# Patient Record
Sex: FEMALE | Race: WHITE | NOT HISPANIC OR LATINO | URBAN - METROPOLITAN AREA
[De-identification: names, ages, dates, MRNs, and addresses within clinical notes are randomized per-mention and may not be internally consistent; named-entity substitution may affect disease eponyms.]

---

## 2019-08-12 ENCOUNTER — TRANSCRIBE ORDERS (OUTPATIENT)
Dept: LAB | Facility: HOSPITAL | Age: 69
End: 2019-08-12

## 2019-08-12 ENCOUNTER — APPOINTMENT (OUTPATIENT)
Dept: LAB | Facility: HOSPITAL | Age: 69
End: 2019-08-12
Attending: INTERNAL MEDICINE
Payer: COMMERCIAL

## 2019-08-12 DIAGNOSIS — E03.9 HYPOTHYROIDISM: ICD-10-CM

## 2019-08-12 DIAGNOSIS — C50.919 MALIGNANT NEOPLASM OF FEMALE BREAST (CMS/HCC): ICD-10-CM

## 2019-08-12 DIAGNOSIS — M81.0 AGE-RELATED OSTEOPOROSIS WITHOUT CURRENT PATHOLOGICAL FRACTURE: ICD-10-CM

## 2019-08-12 DIAGNOSIS — C50.919 MALIGNANT NEOPLASM OF FEMALE BREAST (CMS/HCC): Primary | ICD-10-CM

## 2019-08-12 LAB
CALCIUM SERPL-MCNC: 9.4 MG/DL (ref 8.9–10.3)
PTH-INTACT SERPL-MCNC: 85.1 PG/ML (ref 12–88)
T3 SERPL-MCNC: 103 NG/DL (ref 87–178)
T4 FREE SERPL-MCNC: 0.84 NG/DL (ref 0.58–1.64)
TSH SERPL DL<=0.05 MIU/L-ACNC: 2.62 MIU/L (ref 0.34–5.6)

## 2019-08-12 PROCEDURE — 83970 ASSAY OF PARATHORMONE: CPT

## 2019-08-12 PROCEDURE — 84443 ASSAY THYROID STIM HORMONE: CPT

## 2019-08-12 PROCEDURE — 84439 ASSAY OF FREE THYROXINE: CPT

## 2019-08-12 PROCEDURE — 36415 COLL VENOUS BLD VENIPUNCTURE: CPT

## 2019-08-12 PROCEDURE — 82652 VIT D 1 25-DIHYDROXY: CPT

## 2019-08-12 PROCEDURE — 84480 ASSAY TRIIODOTHYRONINE (T3): CPT

## 2019-08-15 LAB
1,25(OH)2D SERPL-MCNC: 39 PG/ML (ref 18–72)
1,25(OH)2D2 SERPL-MCNC: <8 PG/ML
1,25(OH)2D3 SERPL-MCNC: 39 PG/ML

## 2020-08-02 ENCOUNTER — HOSPITAL ENCOUNTER (EMERGENCY)
Facility: HOSPITAL | Age: 70
Discharge: HOME | End: 2020-08-02
Attending: STUDENT IN AN ORGANIZED HEALTH CARE EDUCATION/TRAINING PROGRAM
Payer: COMMERCIAL

## 2020-08-02 ENCOUNTER — APPOINTMENT (EMERGENCY)
Dept: RADIOLOGY | Facility: HOSPITAL | Age: 70
End: 2020-08-02
Attending: STUDENT IN AN ORGANIZED HEALTH CARE EDUCATION/TRAINING PROGRAM
Payer: COMMERCIAL

## 2020-08-02 VITALS
BODY MASS INDEX: 22.08 KG/M2 | HEIGHT: 62 IN | OXYGEN SATURATION: 100 % | RESPIRATION RATE: 16 BRPM | HEART RATE: 80 BPM | SYSTOLIC BLOOD PRESSURE: 159 MMHG | TEMPERATURE: 97.3 F | WEIGHT: 120 LBS | DIASTOLIC BLOOD PRESSURE: 90 MMHG
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
## 2020-08-02 DIAGNOSIS — S52.501A CLOSED FRACTURE OF DISTAL ENDS OF RIGHT RADIUS AND ULNA, INITIAL ENCOUNTER: ICD-10-CM

## 2020-08-02 DIAGNOSIS — S52.91XA CLOSED FRACTURE OF RIGHT FOREARM, INITIAL ENCOUNTER: Primary | ICD-10-CM

## 2020-08-02 DIAGNOSIS — S52.601A CLOSED FRACTURE OF DISTAL ENDS OF RIGHT RADIUS AND ULNA, INITIAL ENCOUNTER: ICD-10-CM

## 2020-08-02 LAB
ABO + RH BLD: NORMAL
ANION GAP SERPL CALC-SCNC: 9 MEQ/L (ref 3–15)
APTT PPP: 28 SEC (ref 23–35)
BLD GP AB SCN SERPL QL: NEGATIVE
BUN SERPL-MCNC: 18 MG/DL (ref 8–20)
CALCIUM SERPL-MCNC: 9.1 MG/DL (ref 8.9–10.3)
CHLORIDE SERPL-SCNC: 103 MEQ/L (ref 98–109)
CO2 SERPL-SCNC: 25 MEQ/L (ref 22–32)
CREAT SERPL-MCNC: 0.9 MG/DL (ref 0.6–1.1)
D AG BLD QL: POSITIVE
ERYTHROCYTE [DISTWIDTH] IN BLOOD BY AUTOMATED COUNT: 13 % (ref 11.7–14.4)
GFR SERPL CREATININE-BSD FRML MDRD: >60 ML/MIN/1.73M*2
GLUCOSE SERPL-MCNC: 94 MG/DL (ref 70–99)
HCT VFR BLDCO AUTO: 43.9 % (ref 35–45)
HGB BLD-MCNC: 14.9 G/DL (ref 11.8–15.7)
INR PPP: 0.9 INR
LABORATORY COMMENT REPORT: NORMAL
MCH RBC QN AUTO: 30.5 PG (ref 28–33.2)
MCHC RBC AUTO-ENTMCNC: 33.9 G/DL (ref 32.2–35.5)
MCV RBC AUTO: 90 FL (ref 83–98)
PDW BLD AUTO: 12 FL (ref 9.4–12.3)
PLATELET # BLD AUTO: 202 K/UL (ref 150–369)
POTASSIUM SERPL-SCNC: 3.9 MEQ/L (ref 3.6–5.1)
PROTHROMBIN TIME: 12.4 SEC (ref 12.2–14.5)
RBC # BLD AUTO: 4.88 M/UL (ref 3.93–5.22)
SARS-COV-2 RNA RESP QL NAA+PROBE: NEGATIVE
SODIUM SERPL-SCNC: 137 MEQ/L (ref 136–144)
WBC # BLD AUTO: 9.91 K/UL (ref 3.8–10.5)

## 2020-08-02 PROCEDURE — 73090 X-RAY EXAM OF FOREARM: CPT | Mod: RT

## 2020-08-02 PROCEDURE — 85730 THROMBOPLASTIN TIME PARTIAL: CPT | Performed by: STUDENT IN AN ORGANIZED HEALTH CARE EDUCATION/TRAINING PROGRAM

## 2020-08-02 PROCEDURE — 86850 RBC ANTIBODY SCREEN: CPT

## 2020-08-02 PROCEDURE — 80048 BASIC METABOLIC PNL TOTAL CA: CPT | Performed by: STUDENT IN AN ORGANIZED HEALTH CARE EDUCATION/TRAINING PROGRAM

## 2020-08-02 PROCEDURE — U0002 COVID-19 LAB TEST NON-CDC: HCPCS | Performed by: STUDENT IN AN ORGANIZED HEALTH CARE EDUCATION/TRAINING PROGRAM

## 2020-08-02 PROCEDURE — 0PSHXZZ REPOSITION RIGHT RADIUS, EXTERNAL APPROACH: ICD-10-PCS | Performed by: ORTHOPAEDIC SURGERY

## 2020-08-02 PROCEDURE — 63700000 HC SELF-ADMINISTRABLE DRUG: Performed by: STUDENT IN AN ORGANIZED HEALTH CARE EDUCATION/TRAINING PROGRAM

## 2020-08-02 PROCEDURE — 36415 COLL VENOUS BLD VENIPUNCTURE: CPT | Performed by: STUDENT IN AN ORGANIZED HEALTH CARE EDUCATION/TRAINING PROGRAM

## 2020-08-02 PROCEDURE — 99284 EMERGENCY DEPT VISIT MOD MDM: CPT | Mod: 25

## 2020-08-02 PROCEDURE — 0PSKXZZ REPOSITION RIGHT ULNA, EXTERNAL APPROACH: ICD-10-PCS | Performed by: ORTHOPAEDIC SURGERY

## 2020-08-02 PROCEDURE — 85027 COMPLETE CBC AUTOMATED: CPT | Performed by: STUDENT IN AN ORGANIZED HEALTH CARE EDUCATION/TRAINING PROGRAM

## 2020-08-02 PROCEDURE — 85610 PROTHROMBIN TIME: CPT | Performed by: STUDENT IN AN ORGANIZED HEALTH CARE EDUCATION/TRAINING PROGRAM

## 2020-08-02 PROCEDURE — 93005 ELECTROCARDIOGRAM TRACING: CPT | Performed by: STUDENT IN AN ORGANIZED HEALTH CARE EDUCATION/TRAINING PROGRAM

## 2020-08-02 PROCEDURE — 25605 CLTX DST RDL FX/EPHYS SEP W/: CPT | Mod: 59,RT

## 2020-08-02 RX ORDER — OXYCODONE AND ACETAMINOPHEN 5; 325 MG/1; MG/1
1 TABLET ORAL ONCE
Status: COMPLETED | OUTPATIENT
Start: 2020-08-02 | End: 2020-08-02

## 2020-08-02 RX ORDER — ACETAMINOPHEN 325 MG/1
650 TABLET ORAL ONCE
Status: DISCONTINUED | OUTPATIENT
Start: 2020-08-02 | End: 2020-08-02

## 2020-08-02 RX ORDER — OXYCODONE AND ACETAMINOPHEN 5; 325 MG/1; MG/1
1 TABLET ORAL EVERY 6 HOURS PRN
Qty: 8 TABLET | Refills: 0 | Status: SHIPPED | OUTPATIENT
Start: 2020-08-02 | End: 2022-07-31

## 2020-08-02 RX ADMIN — OXYCODONE HYDROCHLORIDE AND ACETAMINOPHEN 1 TABLET: 5; 325 TABLET ORAL at 09:08

## 2020-08-02 ASSESSMENT — ENCOUNTER SYMPTOMS
PHOTOPHOBIA: 0
SHORTNESS OF BREATH: 0
LIGHT-HEADEDNESS: 0
NUMBNESS: 0
WEAKNESS: 0
VOMITING: 0
FEVER: 0
HEADACHES: 0
JOINT SWELLING: 1
NECK PAIN: 0
COUGH: 0
ABDOMINAL PAIN: 0
NAUSEA: 0
APPETITE CHANGE: 0
BACK PAIN: 0
CHILLS: 0
DIZZINESS: 0
WOUND: 0

## 2020-08-02 NOTE — ED ATTESTATION NOTE
I saw and evaluated the patient, participated in the management, and agree with the findings in the above note. We discussed the case and the treatment plan.  Exam: vss, nad, nontoxic, head at/nc, normal speech, no resp distress, right UE evaluated with no tenderness to shoulder or elbow but deformity to mid/distal forearm. Skin intact. Radial/ulnar pulse easily palpable. Superficial abrasion to posterior forearm without contamination. Distal n/v intact.      Faustino Dias, DO  08/02/20 0905

## 2020-08-02 NOTE — DISCHARGE INSTRUCTIONS
Orthopaedic Surgeon: Dr. Slick Edge    - Will contact patient regarding upcoming surgery   - Advised to return to ED if start experiencing painful numbness/tingling or weakness to hand      For pain: motrin 600 mg with food or tylenol 650-975 mg every 6-8 hours.    For severe pain: percocet as prescribed. Be mindful percocet has tylenol (325 mg) therefore do not exceed 3,000 mg of tylenol in a 24 hour period.    Keep the splint on. Do not get this wet/ cover with showering.

## 2020-08-02 NOTE — CONSULTS
Orthopaedic Surgery Consult    CC: Right forearm injury    SUBJECTIVE   HPI: Tamara Omalley is a 70 y.o. female with PMHx of HTN and chronic pain on PRN oxycodone/gabapentin presenting with right forearm injury. Pt reports slipping and falling on a set of stairs at home, landing directly onto her outstretched right arm. She had immediate onset of pain and deformity to the right mid forearm. She denies any head trauma or loss of consciousness. Does not have pain to any other joint or extremity. No prior orthopaedic surgeries. Denies any numbness/tingling or weakness to hand.       Anticoagulation: N/a  Baseline ambulatory status:  Community ambulator    PMH:  HTN  Chronic rectal pain on prn oxycodone/gabapentin  History reviewed. No pertinent past medical history.    PSH:  History reviewed. No pertinent surgical history.    Meds:  Gabapentin PRN  Oxycodone PRN    No current facility-administered medications on file prior to encounter.      No current outpatient medications on file prior to encounter.       Allergies:  Allergies   Allergen Reactions   • Anastrozole      Other reaction(s): Arthralgias, Myalgias  Pain in finger joints  Pain in finger joints     • Iodine And Iodide Containing Products Rash     Topical Iodine  Topical Iodine         SH:   Social History     Socioeconomic History   • Marital status:      Spouse name: Not on file   • Number of children: Not on file   • Years of education: Not on file   • Highest education level: Not on file   Occupational History   • Not on file   Social Needs   • Financial resource strain: Not on file   • Food insecurity:     Worry: Not on file     Inability: Not on file   • Transportation needs:     Medical: Not on file     Non-medical: Not on file   Tobacco Use   • Smoking status: Never Smoker   • Smokeless tobacco: Never Used   Substance and Sexual Activity   • Alcohol use: Not Currently   • Drug use: Never   • Sexual activity: Defer   Lifestyle   • Physical  activity:     Days per week: Not on file     Minutes per session: Not on file   • Stress: Not on file   Relationships   • Social connections:     Talks on phone: Not on file     Gets together: Not on file     Attends [employer] service: Not on file     Active member of club or organization: Not on file     Attends meetings of clubs or organizations: Not on file     Relationship status: Not on file   • Intimate partner violence:     Fear of current or ex partner: Not on file     Emotionally abused: Not on file     Physically abused: Not on file     Forced sexual activity: Not on file   Other Topics Concern   • Not on file   Social History Narrative   • Not on file           ROS:  CV: Denies CP or Palpitations.  Pulm: Denies SOB or Pain with inspiration      OBJECTIVE  Vitals:    08/02/20 0857   BP: (!) 159/90   Pulse: 80   Resp: 16   Temp: 36.3 °C (97.3 °F)   SpO2: 100%       CBC Results     No lab values to display.          Physical Exam:    General  No acute distress  AAOx3    RUE  Inspection: Deformity present to right forearm with moderate swelling. Skin in tact.   Neurovascular: Palpable Radial Pulse. Sensation to light touch in Median, Ulnar, and Radial Distributions  Motor: Fires anterior interosseus nerve, posterior interosseus nerve, Radial nerve, Ulnar nerve, Hand intrinsics   Palpation:  Tenderness to palpation throughout forearm. Non tender to shoulder, elbow, wrist, or hand  ROM: Able to move shoulder and elbow without pain. Hand ROM stiff due to swelling but intact    LUE  Inspection: No gross deformity. Skin in tact.   Neurovascular: Palpable Radial Pulse. Sensation to light touch in Median, Ulnar, and Radial Distributions  Motor: Fires anterior interosseus nerve, posterior interosseus nerve, Radial nerve, Ulnar nerve, Hand intrinsics   Palpation:  No pain to palpation  ROM: Full Painless range of motion    RLE:  Inspection: No gross deformity. Skin in tact.   Neurovascular: Palpable dorsal pedis  Pulse. Sensation to light touch in Sural, Saphenous, Tibial, superficial peroneal nerve, and deep peroneal nerve Distibutions  Motor:  Fires iliopsoas, Quadriceps, Tibialis Anterior, extensor hallucis longus, gastrocnemius-soleus  Palpation:  No pain to palpation  ROM: Full Painless range of motion    LLE  Inspection: No gross deformity. Skin in tact.   Neurovascular: Palpable dorsal pedis Pulse. Sensation to light touch in Sural, Saphenous, Tibial, superficial peroneal nerve, and deep peroneal nerve Distibutions  Motor:  Fires iliopsoas, Quadriceps, Tibialis Anterior, extensor hallucis longus, gastrocnemius-soleus  Palpation:  No pain to palpation  ROM: Full Painless range of motion      Imaging:  X-rays of the right forearm demonstrate distal 1/3 right radius and ulna diaphyseal fractures with  Angulation to ulnar side. No significant shortening      ASSESSMENT & PLAN   70 y.o. female with right both bone forearm fracture    - Closed reduction/splinting of the right forearm  - Long arm splint  - NWB RUE in splint, sling for comfort  - Orthopaedic surgical intervention required for definitive fixation. Pt will f/u with Dr. Edge to discuss surgery as an outpatient  - Covid/pre-op labs ordered  - Advised patient to return to ED if she starts developing painful numbness/tingling or weakness to right hand    ORTHOPAEDIC SURGERY PROCEDURE NOTE    PROCEDURE: right both bone forearm fracture Closed Reduction/splinting    DIAGNOSIS: right both bone forearm Fracture    All risks, benefitis, and alternatives were discussed with patient regarding procedure  All question pertaining to the risks, benefits, and alternatives were addressed  Pt understands and agreed to proceed     Right hand was placed in fingertraps and 10 lbs of weight hung on right arm to allow for gentle traction of forearm     A sugar tong splint was then applied and interosseous mold was applied.     There were no complications from the procedure.  Pt  tolerated procedure well.       Rowdy Figueroa MD

## 2020-08-02 NOTE — ED PROVIDER NOTES
HPI     Chief Complaint   Patient presents with   • Fall     Patient fell down a few steps c/o right wrist pain    • Upper Extremity Issue       Pt is a 69 yo F with past medical history of HTN, HLD presenting with chief complaint of fall and right wrist pain arriving via EMS from home.  Patient reports just prior to arrival she slipped down her steps, falling down about 4 steps.  She states her right hand was outstretched behind her as she fell.  Patient denies hitting her head or loss of consciousness.  Patient states she is right-hand dominant.  She reports severe pain and obvious deformity in her right arm.  Denies prior history of fracture or surgery to this extremity.  Patient takes aspirin 81 mg every other day but is not on additional blood thinners.  Prior to the fall she has been otherwise feeling well.      History provided by:  Patient       Patient History     History reviewed. No pertinent past medical history.    History reviewed. No pertinent surgical history.    History reviewed. No pertinent family history.    Social History     Tobacco Use   • Smoking status: Never Smoker   • Smokeless tobacco: Never Used   Substance Use Topics   • Alcohol use: Not Currently   • Drug use: Never       Systems Reviewed from Nursing Triage:          Review of Systems     Review of Systems   Constitutional: Negative for appetite change, chills and fever.   Eyes: Negative for photophobia and visual disturbance.   Respiratory: Negative for cough and shortness of breath.    Cardiovascular: Negative for chest pain.   Gastrointestinal: Negative for abdominal pain, nausea and vomiting.   Musculoskeletal: Positive for joint swelling (R wrist). Negative for back pain, gait problem and neck pain.   Skin: Negative for wound.   Neurological: Negative for dizziness, syncope, weakness, light-headedness, numbness and headaches.        Physical Exam     ED Triage Vitals [08/02/20 0857]   Temp Heart Rate Resp BP SpO2   36.3 °C (97.3  "°F) 80 16 (!) 159/90 100 %      Temp src Heart Rate Source Patient Position BP Location FiO2 (%) (Set)   -- -- -- -- --       Pulse Ox %: 100 % (08/02/20 0857)  Pulse Ox Interpretation: Normal (08/02/20 0857)  Heart Rate: 80 (08/02/20 0857)  Rhythm Strip Interpretation: Normal Sinus Rhythm (08/02/20 0857)    Patient Vitals for the past 24 hrs:   BP Temp Pulse Resp SpO2 Height Weight   08/02/20 0857 (!) 159/90 36.3 °C (97.3 °F) 80 16 100 % 1.575 m (5' 2\") 54.4 kg (120 lb)                                          Physical Exam   Constitutional: She is oriented to person, place, and time. She appears well-developed and well-nourished.  Non-toxic appearance. She appears distressed (mildly secondary to pain).   HENT:   Head: Normocephalic and atraumatic.   Eyes: Pupils are equal, round, and reactive to light. Conjunctivae and EOM are normal.   Neck: Full passive range of motion without pain. No spinous process tenderness present. Normal range of motion present.   Cardiovascular: Normal rate and regular rhythm.   Pulses:       Radial pulses are 2+ on the right side.   Pulmonary/Chest: Effort normal. No respiratory distress.   Abdominal: Soft. She exhibits no distension. There is no tenderness.   Musculoskeletal:        Right shoulder: She exhibits normal range of motion and no bony tenderness.        Right elbow: She exhibits normal range of motion and no swelling. No tenderness found.        Right wrist: She exhibits decreased range of motion, bony tenderness, swelling and deformity (dorsal). She exhibits no laceration.        Thoracic back: She exhibits no tenderness and no bony tenderness.        Lumbar back: She exhibits no tenderness and no bony tenderness.        Right upper arm: She exhibits no tenderness and no bony tenderness.        Right forearm: She exhibits bony tenderness (mid humerus & ulna with obvious dorsal deformity of the shaft distally with pt attempt to range R hand) and swelling.        Right hand: " She exhibits normal range of motion and normal capillary refill. Normal sensation noted.   Neurological: She is alert and oriented to person, place, and time.   Skin: Skin is warm and dry.            Procedures    Labs Reviewed   SARS-COV-2 (COVID 19), PCR - Normal       Result Value    SARS-CoV-2 (COVID-19) Negative     CBC - Normal    WBC 9.91      RBC 4.88      Hemoglobin 14.9      Hematocrit 43.9      MCV 90.0      MCH 30.5      MCHC 33.9      RDW 13.0      Platelets 202      MPV 12.0     BASIC METABOLIC PANEL - Normal    Sodium 137      Potassium 3.9      Chloride 103      CO2 25      BUN 18      Creatinine 0.9      Glucose 94      Calcium 9.1      eGFR >60.0      Anion Gap 9     PTT - Normal    PTT 28      Narrative:     Specimen hemolyzed, clotting times may be falsely shortened     PROTIME-INR - Normal    PT 12.4      INR 0.9      Narrative:     Specimen hemolyzed, clotting times may be falsely shortened     TYPE AND SCREEN    Antibody Screen Negative      ABO A      Rh Factor Positive      History Check No type on file         ECG 12 lead   ED Interpretation   EKG 11:55 - nsr 64 bpm, LAD, good rwp, no st/t wave changes, qt/qtc 418/431 ms.       X-RAY FOREARM RIGHT 2 VIEWS   Final Result   IMPRESSION: Distal radial and ulnar fractures with improved alignment.      X-RAY FOREARM RIGHT 2 VIEWS   ED Interpretation   Mid radial/ulnar fx      Final Result   IMPRESSION: Acute distal radial and ulnar fractures.                  ED Course & [facility]         ED Course as of Aug 02 1552   Sun Aug 02, 2020   0903 Pt seen and evaluated along with PA-C. Suspected right forearm fx. Xray and analgesia ordered.     [NS]   0921 Xray demonstrates mid radial/ulnar fx. Page to orthopedics.     [NS]   0926 D/w ortho    [KM]   1021 Ortho at bedside to splint    [KM]   1056 Pt was splinted by ortho, pre-op labs & COVID testing ordered as probable plan for OR in 2 days with Ilyas. Pt can be d/c with splinting & sling, Ilyas will  contact her directly to confirm details.     [KM]   1155 EKG 11:55 - nsr 64 bpm, LAD, good rwp, no st/t wave changes, qt/qtc 418/431 ms.     [NS]      ED Course User Index  [KM] Temi Tejada PA C  [NS] Faustino Dias, DO         Clinical Impressions as of Aug 02 1552   Fall, initial encounter   Closed fracture of distal ends of right radius and ulna, initial encounter     Dispo  Discharge     Temi Tejada PA C  08/02/20 6742

## 2020-08-03 LAB
ATRIAL RATE: 64
P AXIS: 58
PR INTERVAL: 146
QRS DURATION: 84
QT INTERVAL: 418
QTC CALCULATION(BAZETT): 431
R AXIS: -8
T WAVE AXIS: 47
VENTRICULAR RATE: 64

## 2021-04-13 DIAGNOSIS — Z23 ENCOUNTER FOR IMMUNIZATION: ICD-10-CM

## 2022-07-31 ENCOUNTER — APPOINTMENT (EMERGENCY)
Dept: RADIOLOGY | Facility: HOSPITAL | Age: 72
DRG: 552 | End: 2022-07-31
Attending: EMERGENCY MEDICINE
Payer: MEDICARE

## 2022-07-31 ENCOUNTER — HOSPITAL ENCOUNTER (INPATIENT)
Facility: HOSPITAL | Age: 72
LOS: 3 days | Discharge: HOME HEALTH | DRG: 552 | End: 2022-08-05
Attending: EMERGENCY MEDICINE | Admitting: HOSPITALIST
Payer: MEDICARE

## 2022-07-31 DIAGNOSIS — R26.2 AMBULATORY DYSFUNCTION: ICD-10-CM

## 2022-07-31 DIAGNOSIS — Z91.89 AT RISK FOR PROLONGED QT INTERVAL SYNDROME: Primary | ICD-10-CM

## 2022-07-31 DIAGNOSIS — M54.32 LEFT SIDED SCIATICA: ICD-10-CM

## 2022-07-31 DIAGNOSIS — R52 INTRACTABLE PAIN: ICD-10-CM

## 2022-07-31 PROBLEM — M54.9 INTRACTABLE BACK PAIN: Status: ACTIVE | Noted: 2022-07-31

## 2022-07-31 LAB
ANION GAP SERPL CALC-SCNC: 9 MEQ/L (ref 3–15)
BASOPHILS # BLD: 0.04 K/UL (ref 0.01–0.1)
BASOPHILS NFR BLD: 0.5 %
BUN SERPL-MCNC: 15 MG/DL (ref 8–20)
CALCIUM SERPL-MCNC: 8.8 MG/DL (ref 8.9–10.3)
CHLORIDE SERPL-SCNC: 103 MEQ/L (ref 98–109)
CO2 SERPL-SCNC: 24 MEQ/L (ref 22–32)
CREAT SERPL-MCNC: 0.6 MG/DL (ref 0.6–1.1)
DIFFERENTIAL METHOD BLD: NORMAL
EOSINOPHIL # BLD: 0.08 K/UL (ref 0.04–0.36)
EOSINOPHIL NFR BLD: 0.9 %
ERYTHROCYTE [DISTWIDTH] IN BLOOD BY AUTOMATED COUNT: 13.7 % (ref 11.7–14.4)
GFR SERPL CREATININE-BSD FRML MDRD: >60 ML/MIN/1.73M*2
GLUCOSE SERPL-MCNC: 129 MG/DL (ref 70–99)
HCT VFR BLDCO AUTO: 44.7 % (ref 35–45)
HGB BLD-MCNC: 14.8 G/DL (ref 11.8–15.7)
IMM GRANULOCYTES # BLD AUTO: 0.03 K/UL (ref 0–0.08)
IMM GRANULOCYTES NFR BLD AUTO: 0.3 %
LYMPHOCYTES # BLD: 2.39 K/UL (ref 1.2–3.5)
LYMPHOCYTES NFR BLD: 27.3 %
MCH RBC QN AUTO: 30.8 PG (ref 28–33.2)
MCHC RBC AUTO-ENTMCNC: 33.1 G/DL (ref 32.2–35.5)
MCV RBC AUTO: 93.1 FL (ref 83–98)
MONOCYTES # BLD: 0.42 K/UL (ref 0.28–0.8)
MONOCYTES NFR BLD: 4.8 %
NEUTROPHILS # BLD: 5.8 K/UL (ref 1.7–7)
NEUTS SEG NFR BLD: 66.2 %
NRBC BLD-RTO: 0 %
PDW BLD AUTO: 11.6 FL (ref 9.4–12.3)
PLATELET # BLD AUTO: 222 K/UL (ref 150–369)
POTASSIUM SERPL-SCNC: 3.8 MEQ/L (ref 3.6–5.1)
RBC # BLD AUTO: 4.8 M/UL (ref 3.93–5.22)
SARS-COV-2 RNA RESP QL NAA+PROBE: NEGATIVE
SODIUM SERPL-SCNC: 136 MEQ/L (ref 136–144)
WBC # BLD AUTO: 8.76 K/UL (ref 3.8–10.5)

## 2022-07-31 PROCEDURE — 72100 X-RAY EXAM L-S SPINE 2/3 VWS: CPT

## 2022-07-31 PROCEDURE — 93971 EXTREMITY STUDY: CPT | Mod: LT

## 2022-07-31 PROCEDURE — 63700000 HC SELF-ADMINISTRABLE DRUG: Performed by: EMERGENCY MEDICINE

## 2022-07-31 PROCEDURE — 80048 BASIC METABOLIC PNL TOTAL CA: CPT | Performed by: EMERGENCY MEDICINE

## 2022-07-31 PROCEDURE — 96376 TX/PRO/DX INJ SAME DRUG ADON: CPT | Performed by: HOSPITALIST

## 2022-07-31 PROCEDURE — 36415 COLL VENOUS BLD VENIPUNCTURE: CPT | Performed by: EMERGENCY MEDICINE

## 2022-07-31 PROCEDURE — 63600000 HC DRUGS/DETAIL CODE: Performed by: EMERGENCY MEDICINE

## 2022-07-31 PROCEDURE — U0002 COVID-19 LAB TEST NON-CDC: HCPCS | Performed by: EMERGENCY MEDICINE

## 2022-07-31 PROCEDURE — 85025 COMPLETE CBC W/AUTO DIFF WBC: CPT | Performed by: EMERGENCY MEDICINE

## 2022-07-31 PROCEDURE — G0378 HOSPITAL OBSERVATION PER HR: HCPCS

## 2022-07-31 PROCEDURE — 96375 TX/PRO/DX INJ NEW DRUG ADDON: CPT

## 2022-07-31 PROCEDURE — 99284 EMERGENCY DEPT VISIT MOD MDM: CPT | Mod: 25

## 2022-07-31 PROCEDURE — 63600000 HC DRUGS/DETAIL CODE: Performed by: HOSPITALIST

## 2022-07-31 PROCEDURE — 96374 THER/PROPH/DIAG INJ IV PUSH: CPT

## 2022-07-31 PROCEDURE — 73502 X-RAY EXAM HIP UNI 2-3 VIEWS: CPT | Mod: LT

## 2022-07-31 PROCEDURE — 99220 PR INITIAL OBSERVATION CARE/DAY 70 MINUTES: CPT | Performed by: HOSPITALIST

## 2022-07-31 RX ORDER — VALACYCLOVIR HYDROCHLORIDE 1 G/1
TABLET, FILM COATED ORAL
COMMUNITY
Start: 2022-06-23 | End: 2023-12-02 | Stop reason: ENTERED-IN-ERROR

## 2022-07-31 RX ORDER — GABAPENTIN 100 MG/1
CAPSULE ORAL
COMMUNITY
Start: 2022-06-13 | End: 2023-12-02 | Stop reason: ENTERED-IN-ERROR

## 2022-07-31 RX ORDER — KETOROLAC TROMETHAMINE 15 MG/ML
15 INJECTION, SOLUTION INTRAMUSCULAR; INTRAVENOUS
Status: DISCONTINUED | OUTPATIENT
Start: 2022-08-01 | End: 2022-08-05 | Stop reason: HOSPADM

## 2022-07-31 RX ORDER — METOPROLOL TARTRATE 25 MG/1
25 TABLET, FILM COATED ORAL AS NEEDED
COMMUNITY
Start: 2021-09-01

## 2022-07-31 RX ORDER — ROSUVASTATIN CALCIUM 10 MG/1
10 TABLET, COATED ORAL
COMMUNITY
Start: 2022-05-15

## 2022-07-31 RX ORDER — OXYCODONE HYDROCHLORIDE 5 MG/1
5 TABLET ORAL
Status: ON HOLD | COMMUNITY
Start: 2022-07-08 | End: 2022-08-05 | Stop reason: SDUPTHER

## 2022-07-31 RX ORDER — HYDROMORPHONE HYDROCHLORIDE 1 MG/ML
1 INJECTION, SOLUTION INTRAMUSCULAR; INTRAVENOUS; SUBCUTANEOUS
Status: DISCONTINUED | OUTPATIENT
Start: 2022-07-31 | End: 2022-08-01

## 2022-07-31 RX ORDER — KETOROLAC TROMETHAMINE 15 MG/ML
15 INJECTION, SOLUTION INTRAMUSCULAR; INTRAVENOUS ONCE
Status: COMPLETED | OUTPATIENT
Start: 2022-07-31 | End: 2022-07-31

## 2022-07-31 RX ORDER — HYDROMORPHONE HYDROCHLORIDE 1 MG/ML
1 INJECTION, SOLUTION INTRAMUSCULAR; INTRAVENOUS; SUBCUTANEOUS ONCE
Status: COMPLETED | OUTPATIENT
Start: 2022-07-31 | End: 2022-07-31

## 2022-07-31 RX ORDER — POLYETHYLENE GLYCOL 3350 17 G/17G
17 POWDER, FOR SOLUTION ORAL
COMMUNITY

## 2022-07-31 RX ORDER — GABAPENTIN 100 MG/1
100 CAPSULE ORAL 4 TIMES DAILY
Status: DISCONTINUED | OUTPATIENT
Start: 2022-08-01 | End: 2022-08-04

## 2022-07-31 RX ORDER — PREDNISONE 20 MG/1
60 TABLET ORAL ONCE
Status: COMPLETED | OUTPATIENT
Start: 2022-07-31 | End: 2022-07-31

## 2022-07-31 RX ORDER — TRIAMTERENE/HYDROCHLOROTHIAZID 37.5-25 MG
1 TABLET ORAL
COMMUNITY
Start: 2022-05-11

## 2022-07-31 RX ORDER — AMOXICILLIN 250 MG
1 CAPSULE ORAL 2 TIMES DAILY
Status: DISCONTINUED | OUTPATIENT
Start: 2022-07-31 | End: 2022-08-05 | Stop reason: HOSPADM

## 2022-07-31 RX ORDER — PREDNISONE 20 MG/1
60 TABLET ORAL DAILY
Status: DISCONTINUED | OUTPATIENT
Start: 2022-08-01 | End: 2022-08-04

## 2022-07-31 RX ORDER — ROSUVASTATIN CALCIUM 10 MG/1
10 TABLET, COATED ORAL
Status: DISCONTINUED | OUTPATIENT
Start: 2022-08-01 | End: 2022-08-05 | Stop reason: HOSPADM

## 2022-07-31 RX ORDER — LIDOCAINE 560 MG/1
1 PATCH PERCUTANEOUS; TOPICAL; TRANSDERMAL DAILY
Status: DISCONTINUED | OUTPATIENT
Start: 2022-08-01 | End: 2022-08-05 | Stop reason: HOSPADM

## 2022-07-31 RX ORDER — POLYETHYLENE GLYCOL 3350 17 G/17G
17 POWDER, FOR SOLUTION ORAL DAILY
Status: DISCONTINUED | OUTPATIENT
Start: 2022-08-01 | End: 2022-08-05 | Stop reason: HOSPADM

## 2022-07-31 RX ORDER — ASPIRIN 81 MG/1
TABLET ORAL
COMMUNITY
Start: 2021-12-14

## 2022-07-31 RX ORDER — ZOLPIDEM TARTRATE 10 MG/1
TABLET ORAL
COMMUNITY
Start: 2022-06-27

## 2022-07-31 RX ORDER — GABAPENTIN 300 MG/1
300 CAPSULE ORAL EVERY MORNING
COMMUNITY
Start: 2022-05-16

## 2022-07-31 RX ORDER — LIDOCAINE 560 MG/1
1 PATCH PERCUTANEOUS; TOPICAL; TRANSDERMAL ONCE
Status: COMPLETED | OUTPATIENT
Start: 2022-07-31 | End: 2022-08-01

## 2022-07-31 RX ADMIN — HYDROMORPHONE HYDROCHLORIDE 1 MG: 1 INJECTION, SOLUTION INTRAMUSCULAR; INTRAVENOUS; SUBCUTANEOUS at 23:54

## 2022-07-31 RX ADMIN — KETOROLAC TROMETHAMINE 15 MG: 15 INJECTION, SOLUTION INTRAMUSCULAR; INTRAVENOUS at 23:55

## 2022-07-31 RX ADMIN — PREDNISONE 60 MG: 20 TABLET ORAL at 20:15

## 2022-07-31 RX ADMIN — HYDROMORPHONE HYDROCHLORIDE 1 MG: 1 INJECTION, SOLUTION INTRAMUSCULAR; INTRAVENOUS; SUBCUTANEOUS at 20:17

## 2022-07-31 RX ADMIN — KETOROLAC TROMETHAMINE 15 MG: 15 INJECTION, SOLUTION INTRAMUSCULAR; INTRAVENOUS at 18:07

## 2022-07-31 RX ADMIN — LIDOCAINE 1 PATCH: 246 PATCH TOPICAL at 18:06

## 2022-07-31 ASSESSMENT — ENCOUNTER SYMPTOMS
STIFFNESS: 0
MUSCLE WEAKNESS: 0
FREQUENCY: 0
BACK PAIN: 0
EXTREMITY NUMBNESS: 0
JOINT SWELLING: 0
VOMITING: 0
DYSURIA: 0
FEVER: 0
NUMBNESS: 0
CHILLS: 0
NAUSEA: 0
FLANK PAIN: 0
HEMATURIA: 0
DIARRHEA: 0
WEAKNESS: 0
LEG PAIN: 1
HIP PAIN: 1
ABDOMINAL PAIN: 0

## 2022-07-31 NOTE — ED PROVIDER NOTES
Emergency Medicine Note  HPI   HISTORY OF PRESENT ILLNESS     Woke this am with left leg pain worse with movement.  In the afternoon pain worsened tried her oxycodone and neurontin without relief.  Medics gave 25 mcg fentanyl with some relief.  Denies bowel or bladder incontinence, perianal numbness, focal weakness of lower extremity       History provided by:  Patient  Lower Extremity Issue  Location:  Hip  Time since incident:  12 hours  Injury: no    Hip location:  L hip  Pain details:     Quality:  Sharp and shooting    Radiates to:  L leg    Severity:  Moderate    Onset quality:  Sudden    Duration:  12 hours    Timing:  Constant    Progression:  Unchanged  Chronicity:  New  Prior injury to area:  No  Relieved by:  Immobilization  Worsened by:  Bearing weight, abduction and adduction  Ineffective treatments: neurontin, oxycodone.  Associated symptoms: decreased ROM    Associated symptoms: no back pain, no fever, no muscle weakness, no numbness, no stiffness, no swelling and no tingling          Patient History   PAST HISTORY     Reviewed from Nursing Triage:  Tobacco  Allergies  Meds  Problems  Med Hx  Surg Hx  Fam Hx  Soc   Hx        History reviewed. No pertinent past medical history.    History reviewed. No pertinent surgical history.    History reviewed. No pertinent family history.    Social History     Tobacco Use   • Smoking status: Never Smoker   • Smokeless tobacco: Never Used   Substance Use Topics   • Alcohol use: Not Currently   • Drug use: Never         Review of Systems   REVIEW OF SYSTEMS     Review of Systems   Constitutional: Negative for chills and fever.   Gastrointestinal: Negative for abdominal pain, diarrhea, nausea and vomiting.   Genitourinary: Negative for dysuria, flank pain, frequency, hematuria and urgency.   Musculoskeletal: Positive for gait problem. Negative for back pain, joint swelling and stiffness.   Skin: Negative for rash.   Neurological: Negative for weakness and  numbness.         VITALS     ED Vitals    Date/Time Temp Pulse Resp BP SpO2 [facility]   07/31/22 2157 -- 72 18 169/88 98 % [facility]   07/31/22 1956 -- 70 16 165/82 99 %    07/31/22 1748 37.4 °C (99.4 °F) 82 16 171/91 97 % JS        Pulse Ox %: 97 % (07/31/22 1841)  Pulse Ox Interpretation: Normal (07/31/22 1841)           Physical Exam   PHYSICAL EXAM     Physical Exam  Vitals and nursing note reviewed.   Constitutional:       Appearance: Normal appearance. She is normal weight.   HENT:      Head: Normocephalic.   Cardiovascular:      Rate and Rhythm: Normal rate and regular rhythm.      Heart sounds: Normal heart sounds.   Pulmonary:      Effort: Pulmonary effort is normal.      Breath sounds: Normal breath sounds.   Abdominal:      Palpations: Abdomen is soft.      Tenderness: There is no abdominal tenderness. There is no right CVA tenderness or left CVA tenderness.   Musculoskeletal:      Lumbar back: No tenderness or bony tenderness. Normal range of motion. Negative right straight leg raise test and negative left straight leg raise test.        Back:       Right hip: Normal. No tenderness or bony tenderness. Normal range of motion.      Left hip: Tenderness present. No bony tenderness or crepitus. Normal range of motion. Normal strength.      Right knee: Normal. No bony tenderness. Normal range of motion. No tenderness.      Left knee: Normal. No bony tenderness. Normal range of motion. No tenderness.      Right lower leg: No edema.      Left lower leg: No edema (no calf tenderness).      Right ankle: Normal. No tenderness. Normal range of motion.      Left ankle: Normal. No tenderness. Normal range of motion.      Right foot: Normal. Normal range of motion and normal capillary refill. No tenderness or bony tenderness. Normal pulse.      Left foot: Normal. Normal range of motion and normal capillary refill. No tenderness or bony tenderness. Normal pulse.   Skin:     General: Skin is warm and dry.      Capillary Refill:  Capillary refill takes less than 2 seconds.   Neurological:      Mental Status: She is alert and oriented to person, place, and time.   Psychiatric:         Mood and Affect: Mood normal.           PROCEDURES     Procedures     DATA     Results     Procedure Component Value Units Date/Time    SARS-CoV-2 (COVID-19), PCR Nasopharynx [[id number]]  (Normal) Collected: 07/31/22 2158    Specimen: Nasopharyngeal Swab from Nasopharynx Updated: 07/31/22 2232    Narrative:      The following orders were created for panel order SARS-CoV-2 (COVID-19), PCR Nasopharynx.  Procedure                               Abnormality         Status                     ---------                               -----------         ------                     SARS-CoV-2 (COVID-19), P...[[id number]]  Normal              Final result                 Please view results for these tests on the individual orders.    SARS-CoV-2 (COVID-19), PCR Nasopharynx [[id number]]  (Normal) Collected: 07/31/22 2158    Specimen: Nasopharyngeal Swab from Nasopharynx Updated: 07/31/22 2232     SARS-CoV-2 (COVID-19) Negative    Narrative:      Testing performed using real-time PCR for detection of COVID-19. EUA approved validation studies performed on site.     Basic metabolic panel [[id number]]  (Abnormal) Collected: 07/31/22 2124    Specimen: Blood, Venous Updated: 07/31/22 2149     Sodium 136 mEQ/L      Potassium 3.8 mEQ/L      Comment: Results obtained on plasma. Plasma Potassium values may be up to 0.4 mEQ/L less than serum values. The differences may be greater for patients with high platelet or white cell counts.        Chloride 103 mEQ/L      CO2 24 mEQ/L      BUN 15 mg/dL      Creatinine 0.6 mg/dL      Glucose 129 mg/dL      Calcium 8.8 mg/dL      eGFR >60.0 mL/min/1.73m*2      Anion Gap 9 mEQ/L     CBC and differential [[id number]] Collected: 07/31/22 2124    Specimen: Blood, Venous Updated: 07/31/22 2130     WBC 8.76 K/uL      RBC 4.80 M/uL      Hemoglobin 14.8  g/dL      Hematocrit 44.7 %      MCV 93.1 fL      MCH 30.8 pg      MCHC 33.1 g/dL      RDW 13.7 %      Platelets 222 K/uL      MPV 11.6 fL      Differential Type Auto     nRBC 0.0 %      Immature Granulocytes 0.3 %      Neutrophils 66.2 %      Lymphocytes 27.3 %      Monocytes 4.8 %      Eosinophils 0.9 %      Basophils 0.5 %      Immature Granulocytes, Absolute 0.03 K/uL      Neutrophils, Absolute 5.80 K/uL      Lymphocytes, Absolute 2.39 K/uL      Monocytes, Absolute 0.42 K/uL      Eosinophils, Absolute 0.08 K/uL      Basophils, Absolute 0.04 K/uL           Imaging Results          X-RAY LUMBAR SPINE 2 OR 3 VIEWS (Preliminary result)  Result time 07/31/22 19:14:10    ED Interpretation    Djd no fx reviewed with dr ruiz                             X-RAY HIP WITH OR WITHOUT PELVIS 2-3 VW LEFT (Preliminary result)  Result time 07/31/22 19:14:19    ED Interpretation    Djd no fx reviewed with dr ruiz                             US venous leg, LL extremity (Final result)  Result time 07/31/22 19:33:31    Final result                 Impression:    IMPRESSION:No evidence of deep venous thrombosis within the left lower  extremity.             Narrative:    CLINICAL HISTORY: Left lower extremity pain    COMMENT:Real-time sonography left lower extremity deep venous system was  performed 7/31/2022 utilizing color and spectral Doppler interrogation.    The left common femoral, femoral, popliteal and gastrocnemius veins are  compressible containing color flow throughout and spontaneous phasic waveform.  Augmentation is demonstrated.  The confluence is patent.  The posterior tibial  peroneal veins are compressible containing color flow throughout.  The right  common femoral vein is patent.                                No orders to display       Scoring tools                                 ED Course & MDM   MDM / ED COURSE / CLINICAL IMPRESSIONS / DISPO     MDM    ED Course as of 07/31/22 2313   Sun Jul 31, 2022   1937   venous leg, LL extremity  IMPRESSION:No evidence of deep venous thrombosis within the left lower  extremity. [RS]   2150 [facility] paged [RS]   2154 Signed out to [facility] [LB]      ED Course User Index  [LB] Veronique Small PA C  [RS] Evelio Perdomo, PHYLLIS CORDON     Admit / Observation   Final diagnoses:   [M54.32] Left sided sciatica   [R52] Intractable pain   [R26.2] Ambulatory dysfunction              Evelio Perdomo PA C  07/31/22 6690

## 2022-07-31 NOTE — ED ATTESTATION NOTE
I have personally seen and examined the patient.  I reviewed and agree with physician assistant / nurse practitioner’s assessment and plan of care.       Exam: Examination the patient's left lower extremity reveals pain overlying the left gluteal muscle and left posterior hip without contusion.  Examination of the lower extremities unremarkable I was not able to reproduce the patient's pain.  Homans' sign is negative.    Plan: We will check x-rays and ultrasound to further evaluate we will treat her pain with Toradol         Luke Moran DO  07/31/22 9188

## 2022-08-01 ENCOUNTER — APPOINTMENT (OUTPATIENT)
Dept: RADIOLOGY | Facility: HOSPITAL | Age: 72
Setting detail: OBSERVATION
DRG: 552 | End: 2022-08-01
Attending: HOSPITALIST
Payer: MEDICARE

## 2022-08-01 PROBLEM — I10 HYPERTENSION: Status: ACTIVE | Noted: 2022-08-01

## 2022-08-01 LAB
ATRIAL RATE: 71
P AXIS: 58
PR INTERVAL: 148
QRS DURATION: 92
QT INTERVAL: 424
QTC CALCULATION(BAZETT): 460
R AXIS: 20
T WAVE AXIS: 52
VENTRICULAR RATE: 71

## 2022-08-01 PROCEDURE — 63700000 HC SELF-ADMINISTRABLE DRUG: Performed by: INTERNAL MEDICINE

## 2022-08-01 PROCEDURE — 96374 THER/PROPH/DIAG INJ IV PUSH: CPT | Performed by: HOSPITALIST

## 2022-08-01 PROCEDURE — 63600000 HC DRUGS/DETAIL CODE: Performed by: HOSPITALIST

## 2022-08-01 PROCEDURE — 63700000 HC SELF-ADMINISTRABLE DRUG: Performed by: HOSPITALIST

## 2022-08-01 PROCEDURE — G0378 HOSPITAL OBSERVATION PER HR: HCPCS

## 2022-08-01 PROCEDURE — 97166 OT EVAL MOD COMPLEX 45 MIN: CPT | Mod: GO

## 2022-08-01 PROCEDURE — 96372 THER/PROPH/DIAG INJ SC/IM: CPT | Mod: 59 | Performed by: HOSPITALIST

## 2022-08-01 PROCEDURE — 99225 PR SBSQ OBSERVATION CARE/DAY 25 MINUTES: CPT | Performed by: HOSPITALIST

## 2022-08-01 PROCEDURE — 97162 PT EVAL MOD COMPLEX 30 MIN: CPT | Mod: GP

## 2022-08-01 PROCEDURE — 93005 ELECTROCARDIOGRAM TRACING: CPT | Performed by: HOSPITALIST

## 2022-08-01 PROCEDURE — 72158 MRI LUMBAR SPINE W/O & W/DYE: CPT | Mod: ME

## 2022-08-01 PROCEDURE — 200200 PR NO CHARGE: Performed by: HOSPITALIST

## 2022-08-01 PROCEDURE — 96376 TX/PRO/DX INJ SAME DRUG ADON: CPT | Performed by: HOSPITALIST

## 2022-08-01 PROCEDURE — A9585 GADOBUTROL INJECTION: HCPCS | Performed by: HOSPITALIST

## 2022-08-01 RX ORDER — TIZANIDINE 2 MG/1
2 TABLET ORAL 3 TIMES DAILY
Status: DISCONTINUED | OUTPATIENT
Start: 2022-08-01 | End: 2022-08-05 | Stop reason: HOSPADM

## 2022-08-01 RX ORDER — ONDANSETRON 4 MG/1
4 TABLET, ORALLY DISINTEGRATING ORAL EVERY 8 HOURS PRN
Status: DISCONTINUED | OUTPATIENT
Start: 2022-08-01 | End: 2022-08-05 | Stop reason: HOSPADM

## 2022-08-01 RX ORDER — SENNOSIDES 8.6 MG/1
2 TABLET ORAL NIGHTLY
Status: DISCONTINUED | OUTPATIENT
Start: 2022-08-01 | End: 2022-08-04

## 2022-08-01 RX ORDER — OXYCODONE HYDROCHLORIDE 5 MG/1
10 TABLET ORAL
Status: DISCONTINUED | OUTPATIENT
Start: 2022-08-01 | End: 2022-08-05 | Stop reason: HOSPADM

## 2022-08-01 RX ORDER — DIAZEPAM 2 MG/1
2 TABLET ORAL ONCE
Status: COMPLETED | OUTPATIENT
Start: 2022-08-01 | End: 2022-08-01

## 2022-08-01 RX ORDER — LORAZEPAM 1 MG/1
1 TABLET ORAL ONCE
Status: COMPLETED | OUTPATIENT
Start: 2022-08-01 | End: 2022-08-01

## 2022-08-01 RX ORDER — ZOLPIDEM TARTRATE 5 MG/1
5 TABLET ORAL ONCE
Status: COMPLETED | OUTPATIENT
Start: 2022-08-01 | End: 2022-08-01

## 2022-08-01 RX ORDER — TRIAMTERENE/HYDROCHLOROTHIAZID 37.5-25 MG
0.5 TABLET ORAL
Status: DISCONTINUED | OUTPATIENT
Start: 2022-08-01 | End: 2022-08-05 | Stop reason: HOSPADM

## 2022-08-01 RX ORDER — HEPARIN SODIUM 5000 [USP'U]/ML
5000 INJECTION, SOLUTION INTRAVENOUS; SUBCUTANEOUS
Status: DISCONTINUED | OUTPATIENT
Start: 2022-08-01 | End: 2022-08-04

## 2022-08-01 RX ORDER — HYDROMORPHONE HYDROCHLORIDE 1 MG/ML
1 INJECTION, SOLUTION INTRAMUSCULAR; INTRAVENOUS; SUBCUTANEOUS ONCE
Status: COMPLETED | OUTPATIENT
Start: 2022-08-01 | End: 2022-08-01

## 2022-08-01 RX ORDER — GADOBUTROL 604.72 MG/ML
6 INJECTION INTRAVENOUS ONCE
Status: COMPLETED | OUTPATIENT
Start: 2022-08-01 | End: 2022-08-01

## 2022-08-01 RX ORDER — OXYCODONE HYDROCHLORIDE 5 MG/1
5 TABLET ORAL
Status: DISCONTINUED | OUTPATIENT
Start: 2022-08-01 | End: 2022-08-01

## 2022-08-01 RX ORDER — ZOLPIDEM TARTRATE 5 MG/1
10 TABLET ORAL NIGHTLY PRN
Status: DISCONTINUED | OUTPATIENT
Start: 2022-08-01 | End: 2022-08-05 | Stop reason: HOSPADM

## 2022-08-01 RX ORDER — ASPIRIN 81 MG/1
81 TABLET ORAL DAILY
Status: DISCONTINUED | OUTPATIENT
Start: 2022-08-01 | End: 2022-08-05 | Stop reason: HOSPADM

## 2022-08-01 RX ADMIN — HEPARIN SODIUM 5000 UNITS: 5000 INJECTION, SOLUTION INTRAVENOUS; SUBCUTANEOUS at 13:37

## 2022-08-01 RX ADMIN — ZOLPIDEM TARTRATE 10 MG: 5 TABLET, COATED ORAL at 23:00

## 2022-08-01 RX ADMIN — GADOBUTROL 6 MMOL: 604.72 INJECTION INTRAVENOUS at 17:02

## 2022-08-01 RX ADMIN — DIAZEPAM 2 MG: 2 TABLET ORAL at 05:12

## 2022-08-01 RX ADMIN — ZOLPIDEM TARTRATE 5 MG: 5 TABLET, COATED ORAL at 02:42

## 2022-08-01 RX ADMIN — HEPARIN SODIUM 5000 UNITS: 5000 INJECTION, SOLUTION INTRAVENOUS; SUBCUTANEOUS at 06:01

## 2022-08-01 RX ADMIN — HEPARIN SODIUM 5000 UNITS: 5000 INJECTION, SOLUTION INTRAVENOUS; SUBCUTANEOUS at 21:19

## 2022-08-01 RX ADMIN — OXYCODONE HYDROCHLORIDE 5 MG: 5 TABLET ORAL at 02:42

## 2022-08-01 RX ADMIN — OXYCODONE HYDROCHLORIDE 10 MG: 5 TABLET ORAL at 09:18

## 2022-08-01 RX ADMIN — ROSUVASTATIN CALCIUM 10 MG: 10 TABLET, FILM COATED ORAL at 18:22

## 2022-08-01 RX ADMIN — OXYCODONE HYDROCHLORIDE 10 MG: 5 TABLET ORAL at 06:30

## 2022-08-01 RX ADMIN — SENNOSIDES AND DOCUSATE SODIUM 1 TABLET: 50; 8.6 TABLET ORAL at 08:30

## 2022-08-01 RX ADMIN — LIDOCAINE 1 PATCH: 246 PATCH TOPICAL at 18:22

## 2022-08-01 RX ADMIN — POLYETHYLENE GLYCOL 3350 17 G: 17 POWDER, FOR SOLUTION ORAL at 08:29

## 2022-08-01 RX ADMIN — KETOROLAC TROMETHAMINE 15 MG: 15 INJECTION, SOLUTION INTRAMUSCULAR; INTRAVENOUS at 22:12

## 2022-08-01 RX ADMIN — LORAZEPAM 1 MG: 1 TABLET ORAL at 15:47

## 2022-08-01 RX ADMIN — ASPIRIN 81 MG: 81 TABLET, COATED ORAL at 08:30

## 2022-08-01 RX ADMIN — SENNOSIDES AND DOCUSATE SODIUM 1 TABLET: 50; 8.6 TABLET ORAL at 20:33

## 2022-08-01 RX ADMIN — OXYCODONE HYDROCHLORIDE 10 MG: 5 TABLET ORAL at 21:18

## 2022-08-01 RX ADMIN — HYDROMORPHONE HYDROCHLORIDE 1 MG: 1 INJECTION, SOLUTION INTRAMUSCULAR; INTRAVENOUS; SUBCUTANEOUS at 05:12

## 2022-08-01 RX ADMIN — OXYCODONE HYDROCHLORIDE 10 MG: 5 TABLET ORAL at 12:12

## 2022-08-01 RX ADMIN — PREDNISONE 60 MG: 20 TABLET ORAL at 08:30

## 2022-08-01 RX ADMIN — TIZANIDINE 2 MG: 2 TABLET ORAL at 13:37

## 2022-08-01 RX ADMIN — OXYCODONE HYDROCHLORIDE 10 MG: 5 TABLET ORAL at 18:25

## 2022-08-01 RX ADMIN — TRIAMTERENE AND HYDROCHLOROTHIAZIDE 0.5 TABLET: 37.5; 25 TABLET ORAL at 08:30

## 2022-08-01 RX ADMIN — TIZANIDINE 2 MG: 2 TABLET ORAL at 20:33

## 2022-08-01 RX ADMIN — OXYCODONE HYDROCHLORIDE 10 MG: 5 TABLET ORAL at 15:32

## 2022-08-01 ASSESSMENT — COGNITIVE AND FUNCTIONAL STATUS - GENERAL
AFFECT: WFL
MOVING TO AND FROM BED TO CHAIR: 3 - A LITTLE
DRESSING REGULAR LOWER BODY CLOTHING: 2 - A LOT
WALKING IN HOSPITAL ROOM: 3 - A LITTLE
DRESSING REGULAR UPPER BODY CLOTHING: 4 - NONE
MOVING TO AND FROM BED TO CHAIR: 3 - A LITTLE
HELP NEEDED FOR BATHING: 3 - A LITTLE
CLIMB 3 TO 5 STEPS WITH RAILING: 2 - A LOT
HELP NEEDED FOR PERSONAL GROOMING: 3 - A LITTLE
DRESSING REGULAR LOWER BODY CLOTHING: 3 - A LITTLE
STANDING UP FROM CHAIR USING ARMS: 3 - A LITTLE
WALKING IN HOSPITAL ROOM: 3 - A LITTLE
EATING MEALS: 4 - NONE
EATING MEALS: 4 - NONE
CLIMB 3 TO 5 STEPS WITH RAILING: 2 - A LOT
TOILETING: 3 - A LITTLE
STANDING UP FROM CHAIR USING ARMS: 3 - A LITTLE
HELP NEEDED FOR BATHING: 2 - A LOT
TOILETING: 2 - A LOT
DRESSING REGULAR UPPER BODY CLOTHING: 4 - NONE
HELP NEEDED FOR PERSONAL GROOMING: 3 - A LITTLE
AFFECT: WFL

## 2022-08-01 ASSESSMENT — PATIENT HEALTH QUESTIONNAIRE - PHQ9: SUM OF ALL RESPONSES TO PHQ9 QUESTIONS 1 & 2: 0

## 2022-08-01 NOTE — PROGRESS NOTES
Patient:  Tamara Omalley  Location:  [facility] 5PAV 5416  MRN:  [id number]  Today's date:  8/1/2022     Start: RN aware and cleared for therapy. R'cved in bed , agreeable to OT session    End:  Pt left in bed w/ alarm activated. Call bell and personal items within reach. NAD and VSS. Nurse notified of session progress/outcome.      Tamara is a 72 y.o. female admitted on 7/31/2022 with Left sided sciatica [M54.32]  Intractable pain [R52]  Intractable back pain [M54.9]  Ambulatory dysfunction [R26.2]. Principal problem is Intractable back pain.    Past Medical History  Tamara has no past medical history on file.    History of Present Illness  left lower extremity pain that started this morning and has gotten progressively worse.  States that the pain is worse with ambulation.  Limited range of movement.  Associated with back pain that is located in the lumbar area.  Pain is described as sharp, stabbing and shooting pain that runs down from the back to the left lower extremity.  Pain is constant with no improvement of the intensity.  Denies any alleviating factors.  Exacerbated by abduction, abduction, bearing weight and attempting to ambulate.  Patient attempted to take her oxycodone and Neurontin at home with minimal relief. For possible MRI L/S.      OT Vitals    Date/Time Pulse HR Source SpO2 Pt Activity O2 Therapy BP BP Location BP Method Pt Position [facility]   08/01/22 1450 72 Monitor 97 % At rest None (Room air) 137/65 Left upper arm Automatic Lying VTV   08/01/22 1509 66 -- 98 % At rest None (Room air) 147/68 Left upper arm Automatic Lying VTV      OT Pain    Date/Time Pain Type Side/Orientation Location Rating: Rest Rating: Activity Interventions [facility]   08/01/22 1450 Pain Assessment;Pain Reassessment left leg 6 8 position adjusted;care clustered VTV          Prior Living Environment    Flowsheet Row Most Recent Value   Current Living Arrangements home   Living Environment Comment Pt lives w/ her , 2-3  MAX, split level home, 6 steps up to bed and stall shower. (+) DE on 1F        Prior Level of Function    Flowsheet Row Most Recent Value   Dominant Hand right   Ambulation independent   Transferring independent   Toileting independent   Bathing independent   Dressing independent   Eating independent   Communication understands/communicates without difficulty   Prior Level of Function Comment Per pt report she was IND w/ all ADLs and ambulation w/o AD   Assistive Device Currently Used at Home none        Occupational Profile    Flowsheet Row Most Recent Value   Reason for Services/Referral Pt adm w/ intractable back pain that is radiating to LLE   Successful Occupations Retired    Environmental Supports and Barriers From home w/            OT Evaluation and Treatment - 08/01/22 1450        OT Time Calculation    Start Time 1450     Stop Time 1509     Time Calculation (min) 19 min        Session Details    Document Type initial evaluation     Mode of Treatment occupational therapy        General Information    Patient Profile Reviewed yes     Onset of Illness/Injury or Date of Surgery 07/31/22     Referring Physician Alvino     Patient/Family/Caregiver Comments/Observations RN aware and cleared for therapy     General Observations of Patient Pt r'cved in bed, agreeable to OT/PT session     Existing Precautions/Restrictions fall;spinal        Cognition/Psychosocial    Affect/Mental Status (Cognition) WFL     Orientation Status (Cognition) oriented x 3     Follows Commands (Cognition) follows one-step commands     Cognitive Function attention deficit     Attention Deficit (Cognition) perseverates on recent conversation     Comment, Cognition Pt overall cooperative t/o session. Noted to be tangential at times and required redirection to task or question. Perseverative on different parts of conversation and not answering prompted question directly        Hearing Assessment    Hearing Status WFL         Vision Assessment/Intervention    Visual Impairment/Limitations corrective lenses full-time        Sensory Assessment (Somatosensory)    Sensory Assessment (Somatosensory) UE sensation intact        Range of Motion (ROM)    Range of Motion bilateral upper extremities;ROM is WFL     Comment: Range of Motion grossly WFL BUE - assessed during func activity        Strength (Manual Muscle Testing)    Strength (Manual Muscle Testing) bilateral upper extremities;strength is WFL        Strength Comprehensive (MMT)    Comment grossly WFL BUE - assessed during func activity        Bed Mobility    [place], Supine to Sit 2 person assist;minimum assist (75% or more patient effort)     [place], Sit to Supine 1 person assist;minimum assist (75% or more patient effort)     Assistive Device head of bed elevated;draw sheet;bed rails     Comment (Bed Mobility) OOB to R. MIN A x2 for completeness. Partial log roll with repetition of cues for hand placement and tech. MIN Ax1 for return to bed, eager to return to supine        Transfers    Transfers other (see comments)     Comment Pt able to complete minimal bedside stepping at MIN Ax1 and incr time. No overt LOB noted. Limited by pain and dcr activity tolerance. Unable to tolerate further OOB activity. Cannot tolerate sitting at EOB for long or sitting in recliner 2* pain        Sit to Stand Transfer    [place], Sit to Stand Transfer 2 person assist;minimum assist (75% or more patient effort)     Verbal Cues hand placement;technique;safety     Assistive Device walker, front-wheeled   gait belt contraindicated untill lumbar MRI    Comment From EOB        Stand to Sit Transfer    [place], Stand to Sit Transfer 2 person assist;minimum assist (75% or more patient effort)     Verbal Cues hand placement;safety;technique     Assistive Device walker, front-wheeled   gait belt contraindicated untill lumbar MRI    Comment To EOB        Toilet Transfer    Comment  Offered/declined        Safety Issues, Functional Mobility    Impairments Affecting Function (Mobility) balance;endurance/activity tolerance;pain;strength        Balance    Balance Assessment sitting static balance;sitting dynamic balance;sit to stand dynamic balance;standing dynamic balance;standing static balance     Static Sitting Balance mild impairment;sitting, edge of bed     Dynamic Sitting Balance mild impairment;sitting, edge of bed     Sit to Stand Dynamic Balance mild impairment;supported     Static Standing Balance mild impairment;supported     Dynamic Standing Balance moderate impairment;supported     Comment, Balance Ax2 for safe OOB activity w/ RW. No overt LOB noted. Limited by dcr activity tolerance and pain        Lower Body Dressing    Tasks socks     [place] maximum assist (25-49% patient effort)     Position supine     Comment MAX A to adjust BLE socks 2* consideration for pt's pain        Toileting    Comment offered/declined        AM-PAC (TM) - ADL (Current Function)    Putting on and taking off regular lower body clothing? 2 - A Lot     Bathing? 2 - A Lot     Toileting? 2 - A Lot     Putting on/taking off regular upper body clothing? 4 - None     How much help for taking care of personal grooming? 3 - A Little     Eating meals? 4 - None     AM-PAC (TM) ADL Score 17        Assessment/Plan (OT)    Daily Outcome Statement OT eval completed. Pt is a 72 y.o. female adm w/ intractable back pain w/ radiation to LLE. Pt required MIN A x2 for bed mobility, STS, and MIN Ax1 for minimal bedside stepping w/ RW. MAX A for LBD with consideration for pt's pain. Unable to tolerate sitting upright at EOB or in recliner. Limited by pain, dcr activity tolerance, and balance. OT to cont w/ POC in acute setting. Rec d/c SNF pending progress     Rehab Potential fair, will monitor progress closely     Therapy Frequency 3-5 times/wk     Planned Therapy Interventions adaptive equipment training;BADL  retraining;activity tolerance training;functional balance retraining;occupation/activity based interventions;patient/caregiver education/training;transfer/mobility retraining               OT Assessment/Plan    Flowsheet Row Most Recent Value   OT Recommended Discharge Disposition skilled nursing facility at 08/01/2022 1450   Anticipated Equipment Needs At Discharge (OT) other (see comments)  [TBD at next level of care] at 08/01/2022 1450   Patient/Family Therapy Goal Statement To return home at d/c at 08/01/2022 1450                    Education Documentation  Self-Care, taught by Debbi Morgan OT at 8/1/2022  4:02 PM.  Learner: Patient  Readiness: Acceptance  Method: Explanation  Response: Verbalizes Understanding  Comment: OT POC/role, safety and tech w/ func transfers and mobility w/ RW, safety w/ ADLs, and d/c planning          OT Goals    Flowsheet Row Most Recent Value   Bed Mobility Goal 1    Activity/Assistive Device bed mobility activities, all at 08/01/2022 1450   [place] supervision required at 08/01/2022 1450   Time Frame by discharge at 08/01/2022 1450   Progress/Outcome goal ongoing at 08/01/2022 1450   Transfer Goal 1    Activity/Assistive Device all transfers at 08/01/2022 1450   [place] supervision required at 08/01/2022 1450   Time Frame by discharge at 08/01/2022 1450   Progress/Outcome goal ongoing at 08/01/2022 1450   Dressing Goal 1    Activity/Adaptive Equipment lower body dressing at 08/01/2022 1450   [place] minimum assist (75% or more patient effort) at 08/01/2022 1450   Time Frame by discharge at 08/01/2022 1450   Progress/Outcome goal ongoing at 08/01/2022 1450   Toileting Goal 1    Activity/Assistive Device toileting skills, all at 08/01/2022 1450   [place] supervision required at 08/01/2022 1450   Time Frame by discharge at 08/01/2022 1450   Progress/Outcome goal ongoing at 08/01/2022 1450

## 2022-08-01 NOTE — H&P
Hospital Medicine Service -  History & Physical        CHIEF COMPLAINT   Back pain and left leg pain     HISTORY OF PRESENT ILLNESS      Tamara Omalley is a 72 y.o. female with a past medical history of in the setting of breast carcinoma, proctalgia fugax, hyperlipidemia, hypertension, osteopenia and history of anal fissure who presents with left lower extremity pain that started this morning and has gotten progressively worse.  States that the pain is worse with ambulation.  Limited range of movement.  Associated with back pain that is located in the lumbar area.  Pain is described as sharp, stabbing and shooting pain that runs down from the back to the left lower extremity.  Pain is constant with no improvement of the intensity.  Denies any alleviating factors.  Exacerbated by abduction, abduction, bearing weight and attempting to ambulate.  Patient attempted to take her oxycodone and Neurontin at home with minimal relief.  Denies any fevers, chills, weakness affecting the upper or lower extremities, paresthesias affecting the genital and sacral area.  Denies any urinary incontinence, urinary retention, fecal incontinence or fecal retention.  Denies any falls or trauma to the back.  Patient made the decision to come to the hospital for further evaluation and upon EMS arrival she was given a dose fentanyl 25 mcg with some relief of the pain.     X-ray of the lumbar spine and left hip have been obtained with degenerative joint disease seen but no acute fractures or abnormalities.  Upon review of records it appears the patient has had some issues with back pain and left hip pain for which she has received imaging in the past due to concern for possible metastatic disease in the setting of history of breast cancer.    Patient has been administered multiple doses of IV Dilaudid and IV Toradol with continued uncontrolled pain.    Patient denies any chest pain, palpitations, shortness of breath, pain with deep  respirations, abdominal pain, epigastric pain, lightheadedness, dizziness, diarrhea, constipation, dysuria or lower extremity swelling.    PAST MEDICAL AND SURGICAL HISTORY      Past medical history  Invasive Ductal Breast Carcinoma  Rectal Pain  Hyperlipidemia on statin therapy  Herpes zoster  Hypertension  Osteopenia  Agatston coronary artery calcium score less than 100 (2015)  Diverticulitis of colon  Colon adenomas  Proctalgia fugax  Anal fissure  Vaginal stricture  Osteoporosis    Surgical history  Hysteroscopy  Breast biopsy, breast lumpectomy    PCP: Tova Vargas MD    MEDICATIONS      Prior to Admission medications    Medication Sig Start Date End Date Taking? Authorizing Provider   aspirin 81 mg enteric coated tablet Takes every other day. 12/14/21  Yes Tom Bonilla MD   metoprolol tartrate (LOPRESSOR) 25 mg tablet Take 12.5 mg by mouth. 9/1/21  Yes Tom Bonilla MD   gabapentin (NEURONTIN) 100 mg capsule TAKE 1 CAPSULE BY MOUTH 4 TIMES A DAY 6/13/22   Tom Bonilla MD   gabapentin (NEURONTIN) 300 mg capsule TAKE 1 CAPSULE BY MOUTH 4 TIMES A DAY 5/16/22   Tom Bonilla MD   oxyCODONE (ROXICODONE) 5 mg immediate release tablet Take 5 mg by mouth 2 (two) times a day. 7/8/22   Tom Bonilla MD   polyethylene glycol (MIRALAX) 17 gram/dose powder Take 17 g by mouth.    Tom Bonilla MD   rosuvastatin (CRESTOR) 10 mg tablet Take 10 mg by mouth once daily. 5/15/22   Tom Bonilla MD   triamterene-hydrochlorothiazide (MAXZIDE-25) 37.5-25 mg per tablet Take 0.5 tablets by mouth once daily. 5/11/22   Tom Bonilla MD   valACYclovir (VALTREX) 1 gram tablet TAKE TWO TABLETS BY MOUTH TWICE A DAY AS NEEDED 6/23/22   Tom Bonilla MD   zolpidem (AMBIEN) 10 mg tablet TAKE 1 TABLET BY MOUTH DAILY AT BEDTIME AS NEEDED FOR SLEEP. 6/27/22   Tom Bonilla MD   oxyCODONE-acetaminophen (PERCOCET) 5-325 mg per tablet Take 1 tablet by mouth  every 6 (six) hours as needed for severe pain. Initial treatment. No driving / operating heavy machinery or drinking alcohol if taking. 8/2/20 7/31/22  Faustino Dias DO       ALLERGIES      Anastrozole and Iodine and iodide containing products    FAMILY HISTORY      History reviewed. No pertinent family history.    SOCIAL HISTORY      Social History     Socioeconomic History   • Marital status:      Spouse name: None   • Number of children: None   • Years of education: None   • Highest education level: None   Tobacco Use   • Smoking status: Never Smoker   • Smokeless tobacco: Never Used   Substance and Sexual Activity   • Alcohol use: Not Currently   • Drug use: Never   • Sexual activity: Defer     Social Determinants of Health     Food Insecurity: No Food Insecurity   • Worried About Running Out of Food in the Last Year: Never true   • Ran Out of Food in the Last Year: Never true       REVIEW OF SYSTEMS      All other systems reviewed and negative except as noted in HPI    PHYSICAL EXAMINATION      Temp:  [37.4 °C (99.4 °F)] 37.4 °C (99.4 °F)  Heart Rate:  [70-82] 72  Resp:  [16-18] 18  BP: (165-171)/(82-91) 169/88  Body mass index is 22.86 kg/m².    Physical Exam  Vitals and nursing note reviewed.   Constitutional:       General: She is not in acute distress.     Appearance: Normal appearance. She is not ill-appearing, toxic-appearing or diaphoretic.   HENT:      Head: Normocephalic and atraumatic.      Right Ear: External ear normal.      Left Ear: External ear normal.      Nose: Nose normal. No congestion or rhinorrhea.      Mouth/Throat:      Mouth: Mucous membranes are moist.      Pharynx: Oropharynx is clear. No oropharyngeal exudate or posterior oropharyngeal erythema.   Eyes:      General: No scleral icterus.     Conjunctiva/sclera: Conjunctivae normal.      Pupils: Pupils are equal, round, and reactive to light.   Cardiovascular:      Rate and Rhythm: Normal rate and regular rhythm.   Pulmonary:       Effort: Pulmonary effort is normal. No respiratory distress.      Breath sounds: Normal breath sounds. No wheezing, rhonchi or rales.   Abdominal:      General: Bowel sounds are normal. There is no distension.      Palpations: Abdomen is soft.      Tenderness: There is no abdominal tenderness. There is no guarding or rebound.   Musculoskeletal:      Cervical back: Normal range of motion and neck supple. No rigidity or tenderness.      Right lower leg: No edema.      Left lower leg: No edema.      Comments: Bilateral straight leg raise elicits back pain, limited range of movement   Lymphadenopathy:      Cervical: No cervical adenopathy.   Skin:     General: Skin is warm and dry.      Findings: No rash.   Neurological:      General: No focal deficit present.      Mental Status: She is alert and oriented to person, place, and time.   Psychiatric:         Mood and Affect: Mood normal.         Behavior: Behavior normal.         LABS / IMAGING / EKG        Labs  I have reviewed the patient's pertinent labs.  Significant abnormals are Glucose 129.  Lab Results   Component Value Date    GLUCOSE 129 (H) 07/31/2022    CALCIUM 8.8 (L) 07/31/2022     07/31/2022    K 3.8 07/31/2022    CO2 24 07/31/2022     07/31/2022    BUN 15 07/31/2022    CREATININE 0.6 07/31/2022     Lab Results   Component Value Date    WBC 8.76 07/31/2022    HGB 14.8 07/31/2022    HCT 44.7 07/31/2022    MCV 93.1 07/31/2022     07/31/2022       Imaging  I have independently reviewed the pertinent imaging from the last 24 hrs.  Xray L-spine and Left Hip with Pelvis  DJD    Ultrasound Venous Left Leg  The left common femoral, femoral, popliteal and gastrocnemius veins are  compressible containing color flow throughout and spontaneous phasic waveform.  Augmentation is demonstrated.  The confluence is patent.  The posterior tibial  peroneal veins are compressible containing color flow throughout.  The right  common femoral vein is  patent.     --  IMPRESSION:No evidence of deep venous thrombosis within the left lower  extremity.    SARS-CoV-2 (COVID-19) (no units)   Date/Time Value   08/02/2020 1045 Negative     ASSESSMENT AND PLAN           * Intractable back pain  Assessment & Plan  Assessment: Concern for lumbar radiculopathy with pain emanating from the lumbar region down the left lower extremity.  Pain has been uncontrolled despite administration of IV Toradol and IV Dilaudid.  No trauma to the back or left lower extremity.  Differentials include lumbar radiculopathy, osseous metastatic disease due to history of breast cancer versus musculoskeletal pain.    Plan:  Will admit and monitor patient closely on Medr.  Multimodal pain regimen to attempt to control pain.  Suspect there is a neuropathy component of the pain.  Placed on scheduled acetaminophen 650 mg every 6 hours x3 days.  IV Toradol 15 mg every 6 hours around-the-clock.  Oxycodone 5 mg every 3 hours as needed moderate to severe pain.  IV Dilaudid 1 mg every 3 hours for severe breakthrough pain not relieved with oxycodone.  P.o. diazepam 2 mg every 6 hours as needed.  Continue gabapentin 100 mg 4 times daily.  Lidoderm patch applied to the lumbar region.  K pad applied as needed for symptom relief.  Fall precautions.  PT/OT evaluation.  MRI of the lumbar spine to assess further.    Hypertension  Assessment & Plan  Plan:  Attempt to control underlying pain that can be contributing to elevated blood pressures.  Continue metoprolol tartrate 12.5 mg twice daily.  Continue triamterene hydrochlorothiazide 37.5/25 mg tablet half tablet daily.       VTE Assessment: [place] VTE Score: 4  VTE Prophylaxis: Current anticoagulants:  heparin (porcine) 5,000 unit/mL injection 5,000 Units, subcutaneous, q8h INT      Code Status: Full Code  Palliative Care Screening Score: 0   Estimated Discharge Date: 8/2/2022  Disposition Planning: PT is to evaluate patient and assess for needs at discharge.      Aditi Nava MD  7/31/2022

## 2022-08-01 NOTE — HOSPITAL COURSE
Tamara is a 72 y.o. female admitted on 7/31/2022 with Left sided sciatica [M54.32]  Intractable pain [R52]  Intractable back pain [M54.9]  Ambulatory dysfunction [R26.2]. Principal problem is Intractable back pain.    Past Medical History  Tamara has no past medical history on file.    History of Present Illness  left lower extremity pain that started this morning and has gotten progressively worse.  States that the pain is worse with ambulation.  Limited range of movement.  Associated with back pain that is located in the lumbar area.  Pain is described as sharp, stabbing and shooting pain that runs down from the back to the left lower extremity.  Pain is constant with no improvement of the intensity.  Denies any alleviating factors.  Exacerbated by abduction, abduction, bearing weight and attempting to ambulate.  Patient attempted to take her oxycodone and Neurontin at home with minimal relief. For possible MRI L/S.    Ortho consult:  72 y.o. female with back and left lower extremity pain in setting of far lateral L3/4 disc herniation and mild left knee OA     - No acute orthopaedic surgical intervention  - Management of lumbar disc herniation per neurosurgery  - Recommend rest, ice, elevation, and compression for left knee as needed  - NSAIDs/analgesia as indicated  - WBAT LLE    Neuro consult  No neurosurgical intervention recommended.

## 2022-08-01 NOTE — PLAN OF CARE
Problem: Adult Inpatient Plan of Care  Goal: Readiness for Transition of Care  Outcome: Progressing  Intervention: Mutually Develop Transition Plan  Flowsheets (Taken 8/1/2022 1213)  Anticipated Discharge Disposition: (Pending MRI and PT Eval when appropriate.) other (see comments)  Equipment Needed After Discharge: (Not known at this time.) other (see comments)  Assistive Device/Animal Currently Used at Home: none  Transportation Concerns: car, none  Readmission Within the Last 30 Days: no previous admission in last 30 days  Patient/Family Anticipated Services at Transition: (To be determined. Not known at this time.) other (see comments)  Patient/Family Anticipates Transition to: home with family  Transportation Anticipated: family or friend will provide  Concerns to be Addressed: discharge planning   Met with patient at bedside. Independent with adls prior to developing leg pain. Lives with . No services and no equipment at home. PCP Dr Tova Vargas. Pharmacy [facility] [address]. Discharge planning needs not known at this time. MRI, PT/OT consults pending.

## 2022-08-01 NOTE — PLAN OF CARE
Plan of Care Review  Plan of Care Reviewed With: patient  Progress: improving  Outcome Summary: Pt OOB x 1 with RW to toilet. Pt pain not well controlled overnight. PRN valium admin with better relief. VSS. For possible MRI. DC home when cleared.

## 2022-08-01 NOTE — NURSING NOTE
Pt pain not well controlled with current pain regimen, Spoke with Dr. Nava on phone will add PRN valium.

## 2022-08-01 NOTE — ASSESSMENT & PLAN NOTE
Monitor blood pressure  Continue metoprolol tartrate 12.5 mg twice daily.  Continue triamterene hydrochlorothiazide 37.5/25 mg tablet half tablet daily

## 2022-08-01 NOTE — PATIENT CARE CONFERENCE
Care Progression Rounds Note  Date: 8/1/2022  Time: 12:32 PM     Patient Name: Tamara Omalley     Medical Record Number: [id number]   YOB: 1950  Sex: Female      Room/Bed: 5416    Admitting Diagnosis: Left sided sciatica [M54.32]  Intractable pain [R52]  Intractable back pain [M54.9]  Ambulatory dysfunction [R26.2]   Admit Date/Time: 7/31/2022  5:47 PM    Primary Diagnosis: Intractable back pain  Principal Problem: Intractable back pain    GMLOS: pending  Anticipated Discharge Date: 8/2/2022    AM-PAC:  Mobility Score: 17    Discharge Planning:  Concerns to be Addressed: discharge planning  Anticipated Discharge Disposition: other (see comments) (Dispo Pending. MRI, PT/OT consults. )    Barriers to Discharge:  Medical issues not resolved, Test pending    Comments:       Participants:  advanced practice provider, , physical therapy, nursing, social work/services

## 2022-08-01 NOTE — ASSESSMENT & PLAN NOTE
Assessment: Concern for lumbar radiculopathy with pain emanating from the lumbar region down the left lower extremity.  Pain has been uncontrolled despite administration of IV Toradol and IV Dilaudid.  No trauma to the back or left lower extremity.  Differentials include lumbar radiculopathy, osseous metastatic disease due to history of breast cancer versus musculoskeletal pain.      Multimodal pain regimen to attempt to control pain.  Suspect there is a neuropathy component of the pain.  Placed on scheduled acetaminophen 650 mg every 6 hours x3 days.  IV Toradol 15 mg every 6 hours around-the-clock, hesitant about taking ibuprofen as outpatient  Oxycodone 5 mg every 3 hours as needed moderate to severe pain.  IV Dilaudid 1 mg every 3 hours for severe breakthrough pain not relieved with oxycodone while house  P.o. diazepam 2 mg every 6 hours as needed.  Continue gabapentin 100 mg 4 times daily.  Lidoderm patch applied to the lumbar region.  K pad applied as needed for symptom relief.  Fall precautions.  Patient is on prednisone here.  We will discharge her on Medrol Dosepak.  Pain management consulted for possible epidural injection. Dr Estrada evaluated and recommended arranging outpatient epidural after discharge  Neurosurgery recommended physical therapy in 4 weeks

## 2022-08-01 NOTE — PROGRESS NOTES
Patient: Tamara Omalley  Location:  [facility] 5PAV [id number]  MRN:  [id number]  Today's date:  8/1/2022    Patient returned to supine, pillow support under BLE provided in attempt to alleviate pain, call bell and belongings in reach, nurse aware of PT session.    Tamara is a 72 y.o. female admitted on 7/31/2022 with Left sided sciatica [M54.32]  Intractable pain [R52]  Intractable back pain [M54.9]  Ambulatory dysfunction [R26.2]. Principal problem is Intractable back pain.    Past Medical History  Tamara has no past medical history on file.    History of Present Illness  left lower extremity pain that started this morning and has gotten progressively worse.  States that the pain is worse with ambulation.  Limited range of movement.  Associated with back pain that is located in the lumbar area.  Pain is described as sharp, stabbing and shooting pain that runs down from the back to the left lower extremity.  Pain is constant with no improvement of the intensity.  Denies any alleviating factors.  Exacerbated by abduction, abduction, bearing weight and attempting to ambulate.  Patient attempted to take her oxycodone and Neurontin at home with minimal relief. For possible MRI L/S.     Therapy Pain/Vitals     Row Name 08/01/22 1450 08/01/22 1509       Pain/Comfort/Sleep    Pain Charting Type Pain Assessment;Pain Reassessment --    Preferred Pain Scale number (Numeric Rating Pain Scale) --    (0-10) Pain Rating: Rest 6 --    (0-10) Pain Rating: Activity 8 --    Pain Side/Orientation left --    Pain Body Location leg --    Pain Management Interventions position adjusted;care clustered --       Vitals    Pulse 72 66    Heart Rate Source Monitor --    SpO2 97 % 98 %    Patient Activity At rest At rest    Oxygen Therapy None (Room air) None (Room air)    /65 147/68    BP Location Left upper arm Left upper arm    BP Method Automatic Automatic    Patient Position Lying Lying                   PT Pain    Date/Time Pain  Type Side/Orientation Location Rating: Rest Rating: Activity Interventions [facility]   08/01/22 1450 Pain Assessment;Pain Reassessment left leg 6 8 position adjusted;care clustered VTV          Prior Living Environment    Flowsheet Row Most Recent Value   Current Living Arrangements home   Living Environment Comment Pt lives w/ her , 2-3 MAX, split level home, 6 steps up to bed and stall shower. (+) [place] on 1[place]        Prior Level of Function    Flowsheet Row Most Recent Value   Dominant Hand right   Ambulation independent   Transferring independent   Toileting independent   Bathing independent   Dressing independent   Eating independent   Communication understands/communicates without difficulty   Prior Level of Function Comment Per pt report she was IND w/ all ADLs and ambulation w/o AD   Assistive Device Currently Used at Home none           PT Evaluation and Treatment - 08/01/22 1449        PT Time Calculation    Start Time 1449     Stop Time 1508     Time Calculation (min) 19 min        Session Details    Document Type initial evaluation     Mode of Treatment physical therapy        General Information    Patient Profile Reviewed yes     Patient/Family/Caregiver Comments/Observations RN cleared for PT eval     General Observations of Patient Pt received in bed, agreeable to PT eval     Existing Precautions/Restrictions fall;spinal        Cognition/Psychosocial    Affect/Mental Status (Cognition) WFL     Orientation Status (Cognition) oriented x 3     Follows Commands (Cognition) follows one-step commands     Cognitive Function attention deficit     Attention Deficit (Cognition) perseverates on recent conversation     Comment, Cognition tangential and needed to be redirected to tasks        Sensory    Hearing Status WFL        Vision Assessment/Intervention    Visual Impairment/Limitations corrective lenses full-time        Sensory Assessment (Somatosensory)    Left LE Sensory Assessment light touch  awareness;intact     Right LE Sensory Assessment light touch awareness;intact        Range of Motion (ROM)    Left Lower Extremity (ROM) left LE ROM is WFL     Right Lower Extremity (ROM) right LE ROM is WFL     Comment: Range of Motion grossly WFL, but limited by pain in LLE primarily        Bed Mobility    Mower, Supine to Sit minimum assist (75% or more patient effort);2 person assist;verbal cues;nonverbal cues (demo/gesture)   educated on log rolling technique in order to minimize pain    Verbal Cues (Supine to Sit) hand placement;preparatory posture;safety;technique     Mower, Sit to Supine minimum assist (75% or more patient effort);1 person assist;verbal cues     Assistive Device head of bed elevated;bed rails     Comment (Bed Mobility) OOB to R, had LOB while sitting EOB needing Fay x1-2 to assist w/ maintaining EOB sitting balance        Sit to Stand Transfer    Mower, Sit to Stand Transfer minimum assist (75% or more patient effort);2 person assist;nonverbal cues (demo/gesture);verbal cues     Verbal Cues hand placement;maintaining center of gravity over base of support;preparatory posture;technique;safety     Assistive Device walker, front-wheeled     Comment increased pain to 8/10 LLE w/ WBing        Stand to Sit Transfer    Mower, Stand to Sit Transfer minimum assist (75% or more patient effort);2 person assist;verbal cues     Verbal Cues hand placement;safety;technique;proper use of assistive device;preparatory posture     Assistive Device walker, front-wheeled        Gait Training    Mower, Gait minimum assist (75% or more patient effort);2 person assist;verbal cues;nonverbal cues (demo/gesture)     Assistive Device walker, front-wheeled     Distance in Feet 2 feet     Pattern (Gait) step-to     Deviations/Abnormal Patterns (Gait) gait speed decreased;step length decreased;stride length decreased;antalgic;left sided deviations     Comment (Gait/Stairs) able to side  step 2ft to R while standing at EOB before having to sit due to severe LLE pain        Stairs Training    Comment not able to tolerate, not attempted        Safety Issues, Functional Mobility    Impairments Affecting Function (Mobility) balance;endurance/activity tolerance;pain;strength        Balance    Static Sitting Balance mild impairment;supported     Dynamic Sitting Balance mild impairment;supported     Sit to Stand Dynamic Balance mild impairment;supported     Static Standing Balance mild impairment;supported     Dynamic Standing Balance moderate impairment;supported        Motor Skills    Functional Endurance limited by severe pain LLE w/ standing activity        Coping    Observed Emotional State anxious;cooperative     Verbalized Emotional State anxiety     Trust Relationship/Rapport care explained;choices provided;emotional support provided;empathic listening provided;questions answered;questions encouraged;reassurance provided;thoughts/feelings acknowledged        AM-PAC (TM) - Mobility (Current Function)    Turning from your back to your side while in a flat bed without using bedrails? 3 - A Little     Moving from lying on your back to sitting on the side of a flat bed without using bedrails? 3 - A Little     Moving to and from a bed to a chair? 3 - A Little     Standing up from a chair using your arms? 3 - A Little     To walk in a hospital room? 3 - A Little     Climbing 3-5 steps with a railing? 2 - A Lot     AM-PAC (TM) Mobility Score 17        Assessment/Plan (PT)    Daily Outcome Statement Patient participatory but limited by severe pain LLE w/ mobility tasks. Carolyn x2 for bed, STS and amb w/ RW. Will continue to follow for d/c needs. Current rec is for SNF, but will continue to follow and update as appropriate. Patient wants d/c to home w/ 's assistance, however, currently limited functional mobility due to severe pain. Patient is planning for steroid injection to give relief.     Rehab  Potential good, to achieve stated therapy goals     Therapy Frequency 3-5 times/wk     Planned Therapy Interventions balance training;bed mobility training;gait training;stair training;strengthening;transfer training               PT Assessment/Plan    Flowsheet Row Most Recent Value   PT Recommended Discharge Disposition skilled nursing facility at 08/01/2022 1449   Anticipated Equipment Needs at Discharge (PT) walker, front-wheeled  [If for d/c to home, RW likely needed. If for SNF, equipment needs TBD at SNF.] at 08/01/2022 1449   Patient/Family Therapy Goals Statement to go home at d/c and have  assist at 08/01/2022 1449                    Education Documentation  Joint Mobility/Strength, taught by Nataliia Arreguin PT at 8/1/2022  4:13 PM.  Learner: Patient  Readiness: Acceptance  Method: Explanation, Demonstration  Response: Verbalizes Understanding, Demonstrated Understanding, Needs Reinforcement  Comment: PT provided TA and GT education, discussed GOC and d/c recs w/ patient.    Home Safety, taught by Nataliia Arreguin PT at 8/1/2022  4:13 PM.  Learner: Patient  Readiness: Acceptance  Method: Explanation, Demonstration  Response: Verbalizes Understanding, Demonstrated Understanding, Needs Reinforcement  Comment: PT provided TA and GT education, discussed GOC and d/c recs w/ patient.    Energy Conservation, taught by Nataliia Arreguin PT at 8/1/2022  4:13 PM.  Learner: Patient  Readiness: Acceptance  Method: Explanation, Demonstration  Response: Verbalizes Understanding, Demonstrated Understanding, Needs Reinforcement  Comment: PT provided TA and GT education, discussed GOC and d/c recs w/ patient.    Assistive/Adaptive Devices, taught by Nataliia Arreguin PT at 8/1/2022  4:13 PM.  Learner: Patient  Readiness: Acceptance  Method: Explanation, Demonstration  Response: Verbalizes Understanding, Demonstrated Understanding, Needs Reinforcement  Comment: PT provided TA and GT education, discussed GOC and d/c recs w/  patient.          PT Goals    Flowsheet Row Most Recent Value   Bed Mobility Goal 1    Activity/Assistive Device bed mobility activities, all at 08/01/2022 1449   [place] independent at 08/01/2022 1449   Transfer Goal 1    Activity/Assistive Device all transfers, walker, front-wheeled at 08/01/2022 1449   [place] supervision required at 08/01/2022 1449   Time Frame by discharge at 08/01/2022 1449   Progress/Outcome goal ongoing at 08/01/2022 1449   Gait Training Goal 1    Activity/Assistive Device gait (walking locomotion), walker, front-wheeled at 08/01/2022 1449   [place] modified independence at 08/01/2022 1449   Distance 50 at 08/01/2022 1449   Time Frame by discharge at 08/01/2022 1449   Progress/Outcome goal ongoing at 08/01/2022 1449   Stairs Goal 1    Activity/Assistive Device stairs, all skills, using handrail, right at 08/01/2022 1449   [place] supervision required at 08/01/2022 1449   Number of Stairs 6 at 08/01/2022 1449   Time Frame by discharge at 08/01/2022 1449   Progress/Outcome goal ongoing at 08/01/2022 1449

## 2022-08-01 NOTE — PLAN OF CARE
Problem: Adult Inpatient Plan of Care  Goal: Plan of Care Review  Outcome: Progressing  Flowsheets (Taken 8/1/2022 1612)  Progress: improving  Plan of Care Reviewed With: patient  Outcome Summary: PT slick completed.  Goal: Patient-Specific Goal (Individualized)  Outcome: Progressing  Flowsheets (Taken 8/1/2022 1612)  Patient-Specific Goals (Include Timeframe): to go home at d/c

## 2022-08-01 NOTE — PROGRESS NOTES
Hospital Medicine Service -  Daily Progress Note       SUBJECTIVE   Back pain better after oxycodone.Denies any loss of bowel bladder control   OBJECTIVE      Vital signs in last 24 hours:  Temp:  [36.7 °C (98 °F)-37.4 °C (99.4 °F)] 36.7 °C (98.1 °F)  Heart Rate:  [68-82] 68  Resp:  [16-18] 16  BP: (122-171)/(60-91) 129/60  No intake or output data in the 24 hours ending 08/01/22 1049    PHYSICAL EXAMINATION      Physical Exam GEN: well-developed and well-nourished; not in acute distress  HEENT: normocephalic; atraumatic  EYES: EOMI PERRLA conjunctiva clear no discharge  NECK: no JVD; neck supple  CARDIO: regular rate and rhythm; no murmurs or rubs  RESP: clear to auscultation bilaterally; no rales, rhonchi, or wheezes  ABD: soft, non-distended, non-tender, normal bowel sounds  EXT: no cyanosis or edema  SKIN: No Rash exposed skin  MUSCULOSKELETAL: No deformity, refused to move left lower extremity secondary to pain  NEURO: alert and oriented x 3; nonfocal  BEHAVIOR/EMOTIONAL: appropriate; cooperative             LINES, CATHETERS, DRAINS, AIRWAYS, AND WOUNDS   Lines, Drains, and Airways:  Wounds (agree with documentation and present on admission):         Comments:      LABS / IMAGING / TELE      Labs  Results from last 7 days   Lab Units 07/31/22 2124   WBC K/uL 8.76   HEMOGLOBIN g/dL 14.8   HEMATOCRIT % 44.7   PLATELETS K/uL 222     Results from last 7 days   Lab Units 07/31/22  2124   SODIUM mEQ/L 136   POTASSIUM mEQ/L 3.8   CHLORIDE mEQ/L 103   CO2 mEQ/L 24   BUN mg/dL 15   CREATININE mg/dL 0.6   GLUCOSE mg/dL 129*   CALCIUM mg/dL 8.8*       SARS-CoV-2 (COVID-19) (no units)   Date/Time Value   07/31/2022 2158 Negative       Imaging  MRI spine pending  Other imaging reviewed        ASSESSMENT AND PLAN      Hypertension  Assessment & Plan  BP better with pain control  Continue metoprolol tartrate 12.5 mg twice daily.  Continue triamterene hydrochlorothiazide 37.5/25 mg tablet half tablet daily.    *  Intractable back pain  Assessment & Plan  Assessment: Concern for lumbar radiculopathy with pain emanating from the lumbar region down the left lower extremity.  Pain has been uncontrolled despite administration of IV Toradol and IV Dilaudid.  No trauma to the back or left lower extremity.  Differentials include lumbar radiculopathy, osseous metastatic disease due to history of breast cancer versus musculoskeletal pain.      Multimodal pain regimen to attempt to control pain.  Suspect there is a neuropathy component of the pain.  Placed on scheduled acetaminophen 650 mg every 6 hours x3 days.  IV Toradol 15 mg every 6 hours around-the-clock.  Oxycodone 5 mg every 3 hours as needed moderate to severe pain.  IV Dilaudid 1 mg every 3 hours for severe breakthrough pain not relieved with oxycodone.  P.o. diazepam 2 mg every 6 hours as needed.  Continue gabapentin 100 mg 4 times daily.  Lidoderm patch applied to the lumbar region.  K pad applied as needed for symptom relief.  Fall precautions.  PT/OT evaluation.  MRI of the lumbar spine to assess further.         VTE Assessment: [place] VTE Score: 4  VTE Prophylaxis:  Current anticoagulants:  heparin (porcine) 5,000 unit/mL injection 5,000 Units, subcutaneous, q8h INT      Code Status: Full Code  Palliative Care Screening Score: 0   Estimated Discharge Date: 8/2/2022     Disposition Planning: To be determined     Suze De Oliveira MD  8/1/2022

## 2022-08-01 NOTE — PLAN OF CARE
Problem: Adult Inpatient Plan of Care  Goal: Plan of Care Review  Outcome: Progressing  Flowsheets (Taken 8/1/2022 1601)  Progress: improving  Plan of Care Reviewed With: patient  Outcome Summary: OT slick completed. Rec d/c SNF

## 2022-08-02 ENCOUNTER — APPOINTMENT (INPATIENT)
Dept: RADIOLOGY | Facility: HOSPITAL | Age: 72
DRG: 552 | End: 2022-08-02
Attending: HOSPITALIST
Payer: MEDICARE

## 2022-08-02 PROBLEM — M25.562 LEFT KNEE PAIN: Status: ACTIVE | Noted: 2022-08-02

## 2022-08-02 PROBLEM — M54.32 LEFT SIDED SCIATICA: Status: ACTIVE | Noted: 2022-08-02

## 2022-08-02 PROCEDURE — 63600000 HC DRUGS/DETAIL CODE: Performed by: HOSPITALIST

## 2022-08-02 PROCEDURE — 99233 SBSQ HOSP IP/OBS HIGH 50: CPT | Performed by: HOSPITALIST

## 2022-08-02 PROCEDURE — 97116 GAIT TRAINING THERAPY: CPT | Mod: GP

## 2022-08-02 PROCEDURE — 99223 1ST HOSP IP/OBS HIGH 75: CPT | Performed by: NEUROLOGICAL SURGERY

## 2022-08-02 PROCEDURE — 73564 X-RAY EXAM KNEE 4 OR MORE: CPT | Mod: LT

## 2022-08-02 PROCEDURE — 96376 TX/PRO/DX INJ SAME DRUG ADON: CPT | Performed by: HOSPITALIST

## 2022-08-02 PROCEDURE — 96372 THER/PROPH/DIAG INJ SC/IM: CPT | Mod: 59 | Performed by: HOSPITALIST

## 2022-08-02 PROCEDURE — 63700000 HC SELF-ADMINISTRABLE DRUG: Performed by: HOSPITALIST

## 2022-08-02 PROCEDURE — 63700000 HC SELF-ADMINISTRABLE DRUG: Performed by: INTERNAL MEDICINE

## 2022-08-02 PROCEDURE — G0378 HOSPITAL OBSERVATION PER HR: HCPCS

## 2022-08-02 PROCEDURE — 12000000 HC ROOM AND CARE MED/SURG

## 2022-08-02 RX ADMIN — ONDANSETRON 4 MG: 4 TABLET, ORALLY DISINTEGRATING ORAL at 12:49

## 2022-08-02 RX ADMIN — HEPARIN SODIUM 5000 UNITS: 5000 INJECTION, SOLUTION INTRAVENOUS; SUBCUTANEOUS at 14:19

## 2022-08-02 RX ADMIN — ROSUVASTATIN CALCIUM 10 MG: 10 TABLET, FILM COATED ORAL at 18:13

## 2022-08-02 RX ADMIN — OXYCODONE HYDROCHLORIDE 10 MG: 5 TABLET ORAL at 20:42

## 2022-08-02 RX ADMIN — TIZANIDINE 2 MG: 2 TABLET ORAL at 20:36

## 2022-08-02 RX ADMIN — TRIAMTERENE AND HYDROCHLOROTHIAZIDE 0.5 TABLET: 37.5; 25 TABLET ORAL at 05:29

## 2022-08-02 RX ADMIN — SENNOSIDES AND DOCUSATE SODIUM 1 TABLET: 50; 8.6 TABLET ORAL at 20:36

## 2022-08-02 RX ADMIN — OXYCODONE HYDROCHLORIDE 10 MG: 5 TABLET ORAL at 23:40

## 2022-08-02 RX ADMIN — TIZANIDINE 2 MG: 2 TABLET ORAL at 09:06

## 2022-08-02 RX ADMIN — POLYETHYLENE GLYCOL 3350 17 G: 17 POWDER, FOR SOLUTION ORAL at 09:09

## 2022-08-02 RX ADMIN — KETOROLAC TROMETHAMINE 15 MG: 15 INJECTION, SOLUTION INTRAMUSCULAR; INTRAVENOUS at 05:24

## 2022-08-02 RX ADMIN — ASPIRIN 81 MG: 81 TABLET, COATED ORAL at 09:06

## 2022-08-02 RX ADMIN — GABAPENTIN 100 MG: 100 CAPSULE ORAL at 09:05

## 2022-08-02 RX ADMIN — SENNOSIDES AND DOCUSATE SODIUM 1 TABLET: 50; 8.6 TABLET ORAL at 09:06

## 2022-08-02 RX ADMIN — PREDNISONE 60 MG: 20 TABLET ORAL at 09:05

## 2022-08-02 RX ADMIN — TIZANIDINE 2 MG: 2 TABLET ORAL at 14:20

## 2022-08-02 RX ADMIN — OXYCODONE HYDROCHLORIDE 10 MG: 5 TABLET ORAL at 09:06

## 2022-08-02 RX ADMIN — SENNOSIDES 2 TABLET: 8.6 TABLET, FILM COATED ORAL at 20:35

## 2022-08-02 RX ADMIN — LIDOCAINE 1 PATCH: 246 PATCH TOPICAL at 18:13

## 2022-08-02 RX ADMIN — HEPARIN SODIUM 5000 UNITS: 5000 INJECTION, SOLUTION INTRAVENOUS; SUBCUTANEOUS at 21:52

## 2022-08-02 RX ADMIN — OXYCODONE HYDROCHLORIDE 10 MG: 5 TABLET ORAL at 14:49

## 2022-08-02 RX ADMIN — OXYCODONE HYDROCHLORIDE 10 MG: 5 TABLET ORAL at 04:08

## 2022-08-02 RX ADMIN — ZOLPIDEM TARTRATE 10 MG: 5 TABLET, COATED ORAL at 21:52

## 2022-08-02 RX ADMIN — HEPARIN SODIUM 5000 UNITS: 5000 INJECTION, SOLUTION INTRAVENOUS; SUBCUTANEOUS at 05:27

## 2022-08-02 ASSESSMENT — COGNITIVE AND FUNCTIONAL STATUS - GENERAL
CLIMB 3 TO 5 STEPS WITH RAILING: 3 - A LITTLE
WALKING IN HOSPITAL ROOM: 3 - A LITTLE
MOVING TO AND FROM BED TO CHAIR: 3 - A LITTLE
STANDING UP FROM CHAIR USING ARMS: 3 - A LITTLE
AFFECT: ANXIOUS;WFL

## 2022-08-02 NOTE — CONSULTS
Neurosurgery Consultation    CC:   Chief Complaint   Patient presents with   • Hip Pain   • Leg Pain       Reason for Consultation:   left L3-L4 neural foramina disk herniation     Requesting Provider:  Alvino    Patient seen and examined at:  8/2/22 11:45AM    History of Present Illness:   Tamara Omalley is a 72 y.o. female with a past medical history of breast carcinoma, proctalgia fugax, hyperlipidemia, hypertension, osteopenia and anal fissure who presents with left lower extremity pain that started 7/31/22 and has gotten progressively worse. She states she woke up with the shin pain on Saturday morning and it progressively got worse. Reports lifetime low back pain but never experienced any previous radicular sx. She reports left inferior knee and shin pain as well as left sided lower back pain that radiates down her lateral left leg to just below her knee. Pain does not reach the foot.  Pain is described as sharp, stabbing and shooting pain that runs down from the back to the left lower extremity. Patient is not sure if her shin/inferior knee pain is the same as her radicular pain. Both pains are intermittent, however the shin pain comes on randomly and radicular pain worsens with movement.  Denies any alleviating factors.  Patient attempted to take her oxycodone and Neurontin at home with minimal relief.   Patient made the decision to come to the hospital for further evaluation and upon EMS arrival she was given a dose fentanyl 25 mcg with some relief of the pain. Patient has been administered multiple doses of IV Dilaudid and IV Toradol with continued uncontrolled pain.    Denies numbness, paresthesias, weakness of BUE, saddle anaesthesia, urinary retention/incontinece  Denies any falls or trauma to the back.      X-ray of the lumbar spine and left hip have been obtained with degenerative joint disease seen but no acute fractures or abnormalities. MRI of the lumbar spine demonstrated multilevel lumbar  degenerative changes and left foraminal disc herniation at L3-L4, for which neurosurgery was consulted.        Medical History: History reviewed. No pertinent past medical history.    Surgical History: History reviewed. No pertinent surgical history.    Family History: History reviewed. No pertinent family history.   Family history non-contributory to neurological condition.    Social History:   Social History     Socioeconomic History   • Marital status:      Spouse name: None   • Number of children: None   • Years of education: None   • Highest education level: None   Tobacco Use   • Smoking status: Never Smoker   • Smokeless tobacco: Never Used   Substance and Sexual Activity   • Alcohol use: Not Currently   • Drug use: Never   • Sexual activity: Defer     Social Determinants of Health     Food Insecurity: No Food Insecurity   • Worried About Running Out of Food in the Last Year: Never true   • Ran Out of Food in the Last Year: Never true       Allergies: Anastrozole and Iodine and iodide containing products    Home Medications:   •  aspirin, Takes every other day.  •  gabapentin, TAKE 1 CAPSULE BY MOUTH 4 TIMES A DAY  •  gabapentin, TAKE 1 CAPSULE BY MOUTH 4 TIMES A DAY  •  metoprolol tartrate, Take 12.5 mg by mouth.  •  oxyCODONE, Take 5 mg by mouth 2 (two) times a day.  •  polyethylene glycol, Take 17 g by mouth.  •  rosuvastatin, Take 10 mg by mouth once daily.  •  triamterene-hydrochlorothiazide, Take 0.5 tablets by mouth once daily.  •  valACYclovir, TAKE TWO TABLETS BY MOUTH TWICE A DAY AS NEEDED  •  zolpidem, TAKE 1 TABLET BY MOUTH DAILY AT BEDTIME AS NEEDED FOR SLEEP.    Current Medications:   •  aspirin, 81 mg, oral, Daily  •  gabapentin, 100 mg, oral, QID  •  heparin (porcine), 5,000 Units, subcutaneous, q8h INT  •  ketorolac, 15 mg, intravenous, q6h INT  •  lidocaine, 1 patch, Topical, Daily  •  metoprolol tartrate, 12.5 mg, oral, BID  •  ondansetron ODT, 4 mg, oral, q8h PRN  •  oxyCODONE, 10  mg, oral, q3h PRN  •  polyethylene glycol, 17 g, oral, Daily  •  predniSONE, 60 mg, oral, Daily  •  rosuvastatin, 10 mg, oral, Daily (6a)  •  senna, 2 tablet, oral, Nightly  •  sennosides-docusate sodium, 1 tablet, oral, BID  •  tiZANidine, 2 mg, oral, TID  •  triamterene-hydrochlorothiazide, 0.5 tablet, oral, Daily (6a)  •  zolpidem, 10 mg, oral, Nightly PRN    Review of Systems: A 14 point review of systems was performed and aside from what is mentioned above is otherwise negative.    General physical examination:  Temp:  [36.1 °C (97 °F)-36.6 °C (97.9 °F)] 36.6 °C (97.8 °F)  Heart Rate:  [58-72] 62  Resp:  [16-18] 16  BP: (112-148)/(57-76) 148/71     The patient is lying in her bed in no acute distress, appears her stated age.     General Appearance: Awake, not in acute distress   Head: Normocephalic, atraumatic.   Eyes:  ENMT: No conjunctival injection. Symmetrical lids  Atraumatic external nose and ears. Moist MM.   Neck: Supple, no nuchal rigidity.   Respiratory: Good respiratory effort.   Cardiovascular: Regular rate.   Abdomen: Soft   Extremities: No edema.   Skin: Dry, no rashes.     Neck:  no pain on flexion, extension or rotation.      Neurologic examination:  Mental status:  The patient is alert, attentive, and oriented. Speech is clear and fluent. Remote and recent memory are normal as well as fund of knowledge.    Cranial nerves:  CN II:  Pupils are equal and briskly reactive to light.   CN III, IV, VI: Extra-occular muscles are intact  CN V: Facial sensation is intact and equal in V1-V3 distributions bilaterally.   CN VII: Face is symmetric with normal eye closure and smile.  CN VIII: Hearing is normal to rubbing fingers  CN IX, X: Palate elevates symmetrically. Phonation is normal.  CN XI: Head turning and shoulder shrug are intact  CN XII: Tongue is midline with normal movements and no atrophy.    Motor:  There is no pronator drift. Muscle bulk and tone are normal.      Deltoid Biceps Triceps Wrist  ext Hand  Finger Spread Hip flexion Knee ext Dorsi-  flexion EHL Plantar Flexion   L 5 5 5 5 5 5 4PL 4-PL 5 5 5   R 5 5 5 5 5 5 5 5 5 5 5       Reflexes:  Reflexes are 2+ and symmetric at the biceps, brachialis, triceps, patellar, and Achilli's. There is no Wright's sign or ankle clonus.    Sensory:  Intact light touch throughout all 4 extremities.    Coordination:  Romberg deferred due to fall risk    Gait:  Gait deferred due to fall risk       Data Review:    Labs  WBC   Date Value Ref Range Status   07/31/2022 8.76 3.80 - 10.50 K/uL Final     Hemoglobin   Date Value Ref Range Status   07/31/2022 14.8 11.8 - 15.7 g/dL Final     Hematocrit   Date Value Ref Range Status   07/31/2022 44.7 35.0 - 45.0 % Final     MCV   Date Value Ref Range Status   07/31/2022 93.1 83.0 - 98.0 fL Final     Platelets   Date Value Ref Range Status   07/31/2022 222 150 - 369 K/uL Final     Sodium   Date Value Ref Range Status   07/31/2022 136 136 - 144 mEQ/L Final     Potassium   Date Value Ref Range Status   07/31/2022 3.8 3.6 - 5.1 mEQ/L Final     Comment:     Results obtained on plasma. Plasma Potassium values may be up to 0.4 mEQ/L less than serum values. The differences may be greater for patients with high platelet or white cell counts.     BUN   Date Value Ref Range Status   07/31/2022 15 8 - 20 mg/dL Final     Creatinine   Date Value Ref Range Status   07/31/2022 0.6 0.6 - 1.1 mg/dL Final     PT   Date Value Ref Range Status   08/02/2020 12.4 12.2 - 14.5 sec Final     PTT   Date Value Ref Range Status   08/02/2020 28 23 - 35 sec Final     INR   Date Value Ref Range Status   08/02/2020 0.9   INR Final     Comment:       INR has no defined significance when PT is within Reference Range.       Images  MRI LUMBAR 8/1/22  CLINICAL HISTORY:  Myelopathy, acute, lumbar spine worsening low back pain.  Patient presents with back and left leg pain.     COMMENT:  Comparison: Lumbar spine radiograph dated 7/31/2022..  Technique:  Multiplanar MR imaging of the lumbar spine was performed without  intravenous contrast.     FINDINGS:     Normal lumbar lordosis is maintained. Vertebral body heights are maintained. No  subluxation. Degenerative endplate fatty marrow changes are noted in the lumbar  spine, sacrum, and iliac bones. There is diffuse intervertebral disc desiccation  with disc space narrowing most prominent at L5-S1. No prevertebral or  paravertebral soft tissue edema. Small hemangioma is noted along the superior  endplate of the L3 vertebra. Endplate degenerative changes are most prominent at  L5-S1. Lumbar spinal canal is patent. Scattered Schmorl's nodes are noted.     The conus terminates at the L1 level. The distal cord is normal in size and  signal characteristics. No abnormal vertebral body or intrathecal enhancement is  identified. However, there is enhancement noted in the left L3-L4 neural  foramina likely associated with the acutely herniated disc.     Level by level assessment:     T12-L1: No disc herniation. No central canal or foraminal narrowing.     L1-L2: Broad-based disc bulge with left foraminal extension. Mild facet  hypertrophy. Mild left foraminal narrowing. No central canal or right foraminal  narrowing.     L2-L3: Broad-based disc bulge with left foraminal extension. Facet hypertrophy  and ligamentum flavum infolding.  Fluid in the right greater than left facet  joints. Mild bilateral foraminal narrowing.  No central canal narrowing.     L3-L4: Broad-based disc bulge with a left foraminal disc protrusion/extrusion  with  facet hypertrophy and ligamentum flavum infolding.  Moderate right and  moderate to severe left foraminal narrowing.  Left foraminal disc  protrusion/extrusion contacts the exiting nerve root within the L3-L4 neural  foramina. No central canal narrowing.     L4-L5: Broad-based disc bulge with left greater than right foraminal extension.  Central annular fissure is noted. Mild facet hypertrophy  and ligamentum flavum  infolding.  Mild right and  Moderate left foraminal narrowing.  Mild central  canal narrowing.     L5-S1: Broad-based disc osteophyte complex  with facet hypertrophy and  ligamentum flavum infolding.  Mild right and  Moderate left foraminal narrowing.  Mild central canal narrowing.     Extra vertebral soft tissues: Within normal limits..  --  IMPRESSION:  1.  Multilevel lumbar degenerative changes without severe central canal  narrowing.  2.  Left foraminal disc herniation with focal protrusion/extrusion and mild  enhancement is noted contacting the exiting nerve root in the left L3-L4 neural  foramina.    Images were personally reviewed by myself and Dr Nixon     Assessment and Plan:  In summary, Tamara Omalley is a 72 y.o. female with a past medical history of breast carcinoma, proctalgia fugax, hyperlipidemia, hypertension, osteopenia and anal fissure who presents with left lower extremity pain that started 7/31/22 and has gotten progressively worse. She reports left inferior knee and shin pain as well as left sided lower back pain that radiates down her lateral left leg to just below her knee. Patient made the decision to come to the hospital for further evaluation and upon EMS arrival she was given a dose fentanyl 25 mcg with some relief of the pain. Patient has been administered multiple doses of IV Dilaudid and IV Toradol with continued uncontrolled pain. X-ray of the lumbar spine and left hip with degenerative joint disease seen but no acute fractures or abnormalities.     MRI of the lumbar spine reviewed and demonstrates multilevel lumbar degenerative changes and small left foraminal disc herniation at L3-L4. Patient's biggest concern of her inferior knee/anterior shin pain does not likely align with the rest of her radicular pain. However her left radicular pain likely due to her L3 compression. Patient should rest and receive course of steroids. If no improvement in 4-5 days,  Pain management should be consulted to evaluate for injections. No neurosurgical intervention recommended.     - Medrol dose pack, should demonstrate improvement in 3-4 weeks   - Pain Medicine consult to evaluate for injections   - PT in 4 weeks   - Patient seen and discused with Dr Hussain Figueroa PA-C

## 2022-08-02 NOTE — PLAN OF CARE
Problem: Adult Inpatient Plan of Care  Goal: Plan of Care Review  Flowsheets (Taken 8/2/2022 1521)  Progress: no change  Plan of Care Reviewed With: patient  Outcome Summary: Pt seen due to concern for osseous metastatic disease. Met with pt at bedside, she reports having a good appetite and PO intake PTA. She has had a poor appetite since admission, reports consuming <50% of meals. She denies weight loss, none indicated per EMR. C/o constipation, discussed diet for this, pt is on miralax and senna. Pt agreeable to ONS.    Goal: Consume >/= 75% of estimated energy needs PO   Recommendations/Interventions:  Continue Regular diet  Ordered Boost Plus BID  Monitor weight, labs, PO intake

## 2022-08-02 NOTE — PROGRESS NOTES
Patient: Tamara Omalley  Location:  [id number]  MRN:  [id number]  Today's date:  8/2/2022       Start of session: Pt was received supine in bed. Patient medically appropriate to participate in therapy per RN    End of session:    Patient positioned in bed with call bell in reach and bed alarm on  Pt did not report or present with any cardiac or neuro symptoms. RN aware of pt's performance during therapy        Tamara is a 72 y.o. female admitted on 7/31/2022 with Left sided sciatica [M54.32]  Intractable pain [R52]  Intractable back pain [M54.9]  Ambulatory dysfunction [R26.2]. Principal problem is Intractable back pain.    Past Medical History  Tamara has no past medical history on file.    History of Present Illness  left lower extremity pain that started this morning and has gotten progressively worse.  States that the pain is worse with ambulation.  Limited range of movement.  Associated with back pain that is located in the lumbar area.  Pain is described as sharp, stabbing and shooting pain that runs down from the back to the left lower extremity.  Pain is constant with no improvement of the intensity.  Denies any alleviating factors.  Exacerbated by abduction, abduction, bearing weight and attempting to ambulate.  Patient attempted to take her oxycodone and Neurontin at home with minimal relief. For possible MRI L/S.      PT Vitals    Date/Time Pulse SpO2 Pt Activity O2 Therapy BP BP Method Pt Position [facility]   08/02/22 0822 62 97 % At rest None (Room air) 140/75 Automatic Lying LA      PT Pain    Date/Time Pain Type Location Rating: Rest Rating: Activity Interventions [facility]   08/02/22 0822 Pain Assessment back 4 - moderate pain 8 - severe pain position adjusted LA          Prior Living Environment    Flowsheet Row Most Recent Value   Current Living Arrangements home   Living Environment Comment Pt lives w/ her , 2-3 MAX, split level home, 6 steps up to bed and stall shower. (+) NM on 1F         Prior Level of Function    Flowsheet Row Most Recent Value   Dominant Hand right   Ambulation independent   Transferring independent   Toileting independent   Bathing independent   Dressing independent   Eating independent   Communication understands/communicates without difficulty   Prior Level of Function Comment Per pt report she was IND w/ all ADLs and ambulation w/o AD   Assistive Device Currently Used at Home none           PT Evaluation and Treatment - 08/02/22 0822        PT Time Calculation    Start Time 0822     Stop Time 0840     Time Calculation (min) 18 min        Session Details    Document Type daily treatment/progress note     Mode of Treatment physical therapy        General Information    Patient Profile Reviewed yes     Onset of Illness/Injury or Date of Surgery 07/31/22     Referring Physician Dr De Oliveira     Patient/Family/Caregiver Comments/Observations RN cleared pt for therapy     General Observations of Patient Pt received supine in bed awake and alert     Existing Precautions/Restrictions fall;spinal        Cognition/Psychosocial    Affect/Mental Status (Cognition) anxious;WFL     Behavioral Issues (Cognition) difficulty managing stress;overwhelmed easily     Orientation Status (Cognition) oriented x 3     Follows Commands (Cognition) follows two-step commands     Cognitive Function attention deficit     Attention Deficit (Cognition) moderate deficit;concentration;requires cues/redirection to task;perseverates on recent conversation        Sensory Assessment (Somatosensory)    Sensory Assessment (Somatosensory) LE sensation intact        Range of Motion (ROM)    Range of Motion ROM is WFL;bilateral lower extremities        Strength (Manual Muscle Testing)    Hip, Left (Strength) 4/5     Knee, Left (Strength) 5/5     Ankle, Left (Strength) 5/5     Hip, Right (Strength) 4/5     Knee, Right (Strength) 5/5     Ankle, Right (Strength) 5/5        Bed Mobility    Bed Mobility bed mobility (all)  activities     [place], Supine to Sit supervision     [place], Sit to Supine supervision     Assistive Device none     Comment (Bed Mobility) flat bed, pt able to perform log roll technique without (A), reports increased pain but able to complete transfer well        Transfers    Transfers other (see comments)     Maintains Weight-Bearing Status (Transfers) able to maintain        Sit to Stand Transfer    [place], Sit to Stand Transfer supervision     Assistive Device walker, front-wheeled     Comment peformed from EOB and low seated chair, no (A) needed, able to come to stance in first attempt, endorses increased pain but overall good balance std        Stand to Sit Transfer    [place], Stand to Sit Transfer supervision     Assistive Device walker, front-wheeled     Comment performed to EOB and normal height chair, good overall eccentric control        Gait Training    [place], Gait supervision     Assistive Device walker, front-wheeled     Distance in Feet 50 feet     Pattern (Gait) step-through     Deviations/Abnormal Patterns (Gait) weight shifting decreased;stride length decreased;step length decreased;antalgic     Comment (Gait/Stairs) pt endorsing increased LLE pain, however was able to amb 2x25ft, no overt LOB, step through gait, amb to chair in room and able to perform transfer from chair without (A)        Stairs Training    [place], Stairs unable to assess   due to pt's pain level       Safety Issues, Functional Mobility    Impairments Affecting Function (Mobility) endurance/activity tolerance;pain     Comment, Safety Issues/Impairments (Mobility) attention deficit, anxiety        Balance    Balance Assessment sitting static balance;sit to stand dynamic balance;standing static balance;standing dynamic balance     Static Sitting Balance WFL;sitting, edge of bed;unsupported     Sit to Stand Dynamic Balance mild impairment;supported     Static Standing Balance WFL;supported      Dynamic Standing Balance mild impairment;supported     Comment, Balance supervision (A) for overall mobility, no overt LOB        AM-PAC (TM) - Mobility (Current Function)    Turning from your back to your side while in a flat bed without using bedrails? 4 - None     Moving from lying on your back to sitting on the side of a flat bed without using bedrails? 3 - A Little     Moving to and from a bed to a chair? 3 - A Little     Standing up from a chair using your arms? 3 - A Little     To walk in a hospital room? 3 - A Little     Climbing 3-5 steps with a railing? 3 - A Little     AM-PAC (TM) Mobility Score 19        Assessment/Plan (PT)    Daily Outcome Statement 8/2/22 Pt is a 72 year old female who presents from home with intractable back pain. Pt seen for PT session. Pt continues to endorse increased back pain/LLE pain. However, despite pain pt did show improvement in her mobility. Ambulating 50ft with supervision (A), able to std from low height surfaces without (A) or difficulty. Increased time needed to complete tasks due to her pain, but given her progression with therapy pt will be safe to return home with spouse- as spouse can (A) as needed per pt. Rec ongoing inpatient PT to optimize her mobility     Rehab Potential good, to achieve stated therapy goals     Therapy Frequency 3-5 times/wk     Planned Therapy Interventions balance training;bed mobility training;gait training;transfer training;stair training               PT Assessment/Plan    Flowsheet Row Most Recent Value   PT Recommended Discharge Disposition home with assistance, home with home health at 08/02/2022 0822   Anticipated Equipment Needs at Discharge (PT) walker, front-wheeled at 08/02/2022 0822   Patient/Family Therapy Goals Statement to go home at d/c and have  assist at 08/01/2022 1449                    Education Documentation  Unresolved/Worsening Symptoms, taught by Cherry Crowell, PT at 8/2/2022 10:41 AM.  Learner:  Patient  Readiness: Acceptance  Method: Demonstration, Explanation  Response: Verbalizes Understanding, Demonstrated Understanding  Comment: reviewed PT POC, role of PT, safety with mobility    Joint Mobility/Strength, taught by Cherry Crowell PT at 8/2/2022 10:41 AM.  Learner: Patient  Readiness: Acceptance  Method: Demonstration, Explanation  Response: Verbalizes Understanding, Demonstrated Understanding  Comment: reviewed PT POC, role of PT, safety with mobility    Home Safety, taught by Cherry Crowell PT at 8/2/2022 10:41 AM.  Learner: Patient  Readiness: Acceptance  Method: Demonstration, Explanation  Response: Verbalizes Understanding, Demonstrated Understanding  Comment: reviewed PT POC, role of PT, safety with mobility    Energy Conservation, taught by Cherry Crowell PT at 8/2/2022 10:41 AM.  Learner: Patient  Readiness: Acceptance  Method: Demonstration, Explanation  Response: Verbalizes Understanding, Demonstrated Understanding  Comment: reviewed PT POC, role of PT, safety with mobility    Assistive/Adaptive Devices, taught by Cherry Crowell PT at 8/2/2022 10:41 AM.  Learner: Patient  Readiness: Acceptance  Method: Demonstration, Explanation  Response: Verbalizes Understanding, Demonstrated Understanding  Comment: reviewed PT POC, role of PT, safety with mobility    Signs/Symptoms, taught by Cherry Crowell PT at 8/2/2022 10:41 AM.  Learner: Patient  Readiness: Acceptance  Method: Demonstration, Explanation  Response: Verbalizes Understanding, Demonstrated Understanding  Comment: reviewed PT POC, role of PT, safety with mobility    Risk Factors, taught by Cherry Crowell PT at 8/2/2022 10:41 AM.  Learner: Patient  Readiness: Acceptance  Method: Demonstration, Explanation  Response: Verbalizes Understanding, Demonstrated Understanding  Comment: reviewed PT POC, role of PT, safety with mobility          PT Goals    Flowsheet Row Most Recent Value   Bed Mobility Goal 1     Activity/Assistive Device bed mobility activities, all at 08/01/2022 1449   [place] independent at 08/01/2022 1449   Transfer Goal 1    Activity/Assistive Device all transfers, walker, front-wheeled at 08/01/2022 1449   [place] supervision required at 08/01/2022 1449   Time Frame by discharge at 08/01/2022 1449   Progress/Outcome goal ongoing at 08/01/2022 1449   Gait Training Goal 1    Activity/Assistive Device gait (walking locomotion), walker, front-wheeled at 08/01/2022 1449   [place] modified independence at 08/01/2022 1449   Distance 50 at 08/01/2022 1449   Time Frame by discharge at 08/01/2022 1449   Progress/Outcome goal ongoing at 08/01/2022 1449   Stairs Goal 1    Activity/Assistive Device stairs, all skills, using handrail, right at 08/01/2022 1449   [place] supervision required at 08/01/2022 1449   Number of Stairs 6 at 08/01/2022 1449   Time Frame by discharge at 08/01/2022 1449   Progress/Outcome goal ongoing at 08/01/2022 1449

## 2022-08-02 NOTE — ASSESSMENT & PLAN NOTE
Acute left knee pain started with back pain.  Likely referred pain from back pain  Xray unremarkable   Appreciate input from orthopedics  The patient wants to see if epidemiology was relieved her knee pain otherwise she is requesting MRI  I discussed with her in detail, many questions asked and attempted to answere

## 2022-08-02 NOTE — UM PHYSICIAN REVIEW NOTE
Inpatient status is appropriate for this 71yo female presenting with back and left leg pain.  She needs ongoing PT, OT and SNF has been recommended.  MRI was obtained and final results are still pending.  She needs ongoing pain control and has been receiving frequent prn Oxy as well as scheduled Tylenol and IV Toradol and Lido patch.  Has required >2MN stay.    Sepideh Rosario MD

## 2022-08-02 NOTE — PATIENT CARE CONFERENCE
Care Progression Rounds Note  Date: 8/2/2022  Time: 1:56 PM     Patient Name: Tamara Omalley     Medical Record Number: [id number]   YOB: 1950  Sex: Female      Room/Bed: 5416    Admitting Diagnosis: Left sided sciatica [M54.32]  Intractable pain [R52]  Intractable back pain [M54.9]  Ambulatory dysfunction [R26.2]   Admit Date/Time: 7/31/2022  5:47 PM    Primary Diagnosis: Intractable back pain  Principal Problem: Intractable back pain    GMLOS: 2.9  Anticipated Discharge Date: 8/3/2022    AM-PAC:  Mobility Score: 19    Discharge Planning:  Current Living Arrangements: home  Concerns to be Addressed: discharge planning  Anticipated Discharge Disposition: home with assistance, home with home health    Barriers to Discharge:  Medical issues not resolved    Comments:       Participants:  advanced practice provider, , physical therapy, nursing, social work/services

## 2022-08-02 NOTE — PROGRESS NOTES
Hospital Medicine Service -  Daily Progress Note       SUBJECTIVE   Back pain improved as per patient.  Denies any loss of bowel or bladder control.  She is worried that she might have metastatic disease in her left knee.  Requesting to get MRI of left knee     OBJECTIVE      Vital signs in last 24 hours:  Temp:  [36.1 °C (97 °F)-36.6 °C (97.9 °F)] 36.6 °C (97.8 °F)  Heart Rate:  [58-72] 62  Resp:  [16-18] 16  BP: (112-148)/(57-76) 148/71  No intake or output data in the 24 hours ending 08/02/22 1246    PHYSICAL EXAMINATION      Physical Exam  GEN lying comfortably in bed; not in acute distress  HEENT: normocephalic; atraumatic  EYES: EOMI PERRLA conjunctiva clear  NECK: no JVD; no bruits  CARDIO: regular rate and rhythm; no murmurs or rubs  RESP: clear to auscultation bilaterally; no rales, rhonchi, or wheezes  ABD: soft, non-distended, non-tender, normal bowel sounds  EXT: no cyanosis or edema.  No swelling, tenderness redness or warmth and tenderness of left knee  SKIN: No Rash exposed skin  MUSCULOSKELETAL: no injury or deformity  NEURO: alert and oriented x 3; nonfocal  BEHAVIOR/EMOTIONAL: appropriate; cooperative  LYMPH NODES: no cervical lymphadenopathy         LINES, CATHETERS, DRAINS, AIRWAYS, AND WOUNDS   Lines, Drains, and Airways:  Wounds (agree with documentation and present on admission):  Peripheral IV (Adult) 08/01/22 Distal;Posterior;Right Forearm (Active)   Number of days: 1         Comments:      LABS / IMAGING / TELE      Labs  Results from last 7 days   Lab Units 07/31/22  2124   WBC K/uL 8.76   HEMOGLOBIN g/dL 14.8   HEMATOCRIT % 44.7   PLATELETS K/uL 222     Results from last 7 days   Lab Units 07/31/22  2124   SODIUM mEQ/L 136   POTASSIUM mEQ/L 3.8   CHLORIDE mEQ/L 103   CO2 mEQ/L 24   BUN mg/dL 15   CREATININE mg/dL 0.6   GLUCOSE mg/dL 129*   CALCIUM mg/dL 8.8*       SARS-CoV-2 (COVID-19) (no units)   Date/Time Value   07/31/2022 2158 Negative       Imaging  MRI report reviewed   X-ray  of knee pending    ASSESSMENT AND PLAN      Left knee pain  Assessment & Plan  Acute left knee pain started with back pain.  Likely referred pain from radiculopathy.  The patient is requesting to get MRI to rule out any malignancy.  Would get x-ray of knee for now       Hypertension  Assessment & Plan  Monitor blood pressure  Continue metoprolol tartrate 12.5 mg twice daily.  Continue triamterene hydrochlorothiazide 37.5/25 mg tablet half tablet daily.    * Intractable back pain  Assessment & Plan  Assessment: Concern for lumbar radiculopathy with pain emanating from the lumbar region down the left lower extremity.  Pain has been uncontrolled despite administration of IV Toradol and IV Dilaudid.  No trauma to the back or left lower extremity.  Differentials include lumbar radiculopathy, osseous metastatic disease due to history of breast cancer versus musculoskeletal pain.      Multimodal pain regimen to attempt to control pain.  Suspect there is a neuropathy component of the pain.  Placed on scheduled acetaminophen 650 mg every 6 hours x3 days.  IV Toradol 15 mg every 6 hours around-the-clock.  Oxycodone 5 mg every 3 hours as needed moderate to severe pain.  IV Dilaudid 1 mg every 3 hours for severe breakthrough pain not relieved with oxycodone.  P.o. diazepam 2 mg every 6 hours as needed.  Continue gabapentin 100 mg 4 times daily.  Lidoderm patch applied to the lumbar region.  K pad applied as needed for symptom relief.  Fall precautions.  PT/OT evaluation.  MRI of the lumbar spine report reviewed   Pain better with muscle relaxant as per patient   Await input from neurosurgery   Called and left message for Dr. Yomi Allison patient's oncologist at [facility] on patient's request       VTE Assessment: [place] VTE Score: 4  VTE Prophylaxis:  Current anticoagulants:  heparin (porcine) 5,000 unit/mL injection 5,000 Units, subcutaneous, q8h INT      Code Status: Full Code  Palliative Care Screening Score: 0   Estimated Discharge  Date: 8/3/2022     Disposition Planning: Next 48 to 72 hours     Suze De Oliveira MD  8/2/2022

## 2022-08-02 NOTE — PLAN OF CARE
Plan of Care Review  Plan of Care Reviewed With: patient, spouse  Progress: no change  Outcome Summary: AAOx4. Neurosurg c/s for L L3-L4 neural foramina disk herniation; no intervention. Continues to have mod-severe LLE; PRN Oxy given. OOB to toilet w/ walker; standby assist. Increased pain noted w/ ambulation. L knee x-ray (no evidene of acute fx). Plan to continue to manage pain. All safety protocols in place.

## 2022-08-02 NOTE — PLAN OF CARE
Plan of Care Review  Plan of Care Reviewed With: patient  Progress: no change  Outcome Summary: Pt OOB x1 with walker. Pt receiving ATC tylenol, zanaflex, and PRN oxycodone for pain. Voiding up to toilet. D/c plan pending MRI results.

## 2022-08-03 ENCOUNTER — TELEPHONE (OUTPATIENT)
Dept: NEUROSURGERY | Facility: CLINIC | Age: 72
End: 2022-08-03
Payer: COMMERCIAL

## 2022-08-03 PROCEDURE — 63700000 HC SELF-ADMINISTRABLE DRUG: Performed by: HOSPITALIST

## 2022-08-03 PROCEDURE — 99232 SBSQ HOSP IP/OBS MODERATE 35: CPT | Performed by: NEUROLOGICAL SURGERY

## 2022-08-03 PROCEDURE — 63700000 HC SELF-ADMINISTRABLE DRUG: Performed by: INTERNAL MEDICINE

## 2022-08-03 PROCEDURE — 200200 PR NO CHARGE: Performed by: HOSPITALIST

## 2022-08-03 PROCEDURE — 93005 ELECTROCARDIOGRAM TRACING: CPT | Performed by: HOSPITALIST

## 2022-08-03 PROCEDURE — 63600000 HC DRUGS/DETAIL CODE: Performed by: HOSPITALIST

## 2022-08-03 PROCEDURE — 99233 SBSQ HOSP IP/OBS HIGH 50: CPT | Performed by: HOSPITALIST

## 2022-08-03 PROCEDURE — 97530 THERAPEUTIC ACTIVITIES: CPT | Mod: GO

## 2022-08-03 PROCEDURE — 97110 THERAPEUTIC EXERCISES: CPT | Mod: GO

## 2022-08-03 PROCEDURE — 97530 THERAPEUTIC ACTIVITIES: CPT | Mod: GP

## 2022-08-03 PROCEDURE — 12000000 HC ROOM AND CARE MED/SURG

## 2022-08-03 RX ORDER — CYANOCOBALAMIN (VITAMIN B-12) 500 MCG
1 TABLET ORAL SEE ADMIN INSTRUCTIONS
COMMUNITY

## 2022-08-03 RX ADMIN — ROSUVASTATIN CALCIUM 10 MG: 10 TABLET, FILM COATED ORAL at 17:52

## 2022-08-03 RX ADMIN — SENNOSIDES 2 TABLET: 8.6 TABLET, FILM COATED ORAL at 21:36

## 2022-08-03 RX ADMIN — HEPARIN SODIUM 5000 UNITS: 5000 INJECTION, SOLUTION INTRAVENOUS; SUBCUTANEOUS at 13:49

## 2022-08-03 RX ADMIN — OXYCODONE HYDROCHLORIDE 10 MG: 5 TABLET ORAL at 03:34

## 2022-08-03 RX ADMIN — HEPARIN SODIUM 5000 UNITS: 5000 INJECTION, SOLUTION INTRAVENOUS; SUBCUTANEOUS at 06:17

## 2022-08-03 RX ADMIN — ASPIRIN 81 MG: 81 TABLET, COATED ORAL at 09:15

## 2022-08-03 RX ADMIN — SENNOSIDES AND DOCUSATE SODIUM 1 TABLET: 50; 8.6 TABLET ORAL at 20:32

## 2022-08-03 RX ADMIN — POLYETHYLENE GLYCOL 3350 17 G: 17 POWDER, FOR SOLUTION ORAL at 09:14

## 2022-08-03 RX ADMIN — TRIAMTERENE AND HYDROCHLOROTHIAZIDE 0.5 TABLET: 37.5; 25 TABLET ORAL at 06:17

## 2022-08-03 RX ADMIN — OXYCODONE HYDROCHLORIDE 10 MG: 5 TABLET ORAL at 09:13

## 2022-08-03 RX ADMIN — OXYCODONE HYDROCHLORIDE 10 MG: 5 TABLET ORAL at 17:54

## 2022-08-03 RX ADMIN — HEPARIN SODIUM 5000 UNITS: 5000 INJECTION, SOLUTION INTRAVENOUS; SUBCUTANEOUS at 21:36

## 2022-08-03 RX ADMIN — OXYCODONE HYDROCHLORIDE 10 MG: 5 TABLET ORAL at 13:59

## 2022-08-03 RX ADMIN — TIZANIDINE 2 MG: 2 TABLET ORAL at 20:32

## 2022-08-03 RX ADMIN — TIZANIDINE 2 MG: 2 TABLET ORAL at 09:15

## 2022-08-03 RX ADMIN — PREDNISONE 60 MG: 20 TABLET ORAL at 09:15

## 2022-08-03 RX ADMIN — LIDOCAINE 1 PATCH: 246 PATCH TOPICAL at 17:52

## 2022-08-03 RX ADMIN — OXYCODONE HYDROCHLORIDE 10 MG: 5 TABLET ORAL at 22:43

## 2022-08-03 RX ADMIN — SENNOSIDES AND DOCUSATE SODIUM 1 TABLET: 50; 8.6 TABLET ORAL at 09:17

## 2022-08-03 RX ADMIN — TIZANIDINE 2 MG: 2 TABLET ORAL at 13:49

## 2022-08-03 RX ADMIN — ZOLPIDEM TARTRATE 10 MG: 5 TABLET, COATED ORAL at 22:43

## 2022-08-03 ASSESSMENT — COGNITIVE AND FUNCTIONAL STATUS - GENERAL
AFFECT: WFL
CLIMB 3 TO 5 STEPS WITH RAILING: 2 - A LOT
DRESSING REGULAR LOWER BODY CLOTHING: 2 - A LOT
DRESSING REGULAR UPPER BODY CLOTHING: 4 - NONE
AFFECT: FLAT/BLUNTED AFFECT;ANXIOUS
WALKING IN HOSPITAL ROOM: 3 - A LITTLE
STANDING UP FROM CHAIR USING ARMS: 3 - A LITTLE
TOILETING: 2 - A LOT
EATING MEALS: 4 - NONE
MOVING TO AND FROM BED TO CHAIR: 3 - A LITTLE
HELP NEEDED FOR BATHING: 2 - A LOT
HELP NEEDED FOR PERSONAL GROOMING: 3 - A LITTLE

## 2022-08-03 NOTE — PROGRESS NOTES
Hospital Medicine Service -  Daily Progress Note       SUBJECTIVE   Insisting to get MRI of left knee to rule out mets from breast cancer.  The patient is reassured that breast cancer would not metastasize to her knee.  Left knee x-rays discussed with her.  She is agreeable to see Dr. Brooks Estrada for epidural injection.  And see if that helps with her knee pain and wait for MRI     OBJECTIVE      Vital signs in last 24 hours:  Temp:  [36.5 °C (97.7 °F)-36.7 °C (98 °F)] 36.7 °C (98 °F)  Heart Rate:  [60-72] 70  Resp:  [16-18] 18  BP: (109-149)/(62-70) 109/68  No intake or output data in the 24 hours ending 08/03/22 1237    PHYSICAL EXAMINATION      Physical Exam GEN: Thin female in not in acute distress  HEENT: normocephalic; atraumatic  EYES: EOMI PERRLA Vinacal clear no discharge  NECK: no JVD; neck supple  CARDIO: regular rate and rhythm; no murmurs or rubs  RESP: clear to auscultation bilaterally; no rales, rhonchi, or wheezes  ABD: soft, non-distended, non-tender, normal bowel sounds  EXT: no cyanosis or edema.  No swelling, tenderness increased temperature of left knee  SKIN: No Rash exposed skin  MUSCULOSKELETAL: no injury or deformity  NEURO: alert and oriented x 3; nonfocal  BEHAVIOR/EMOTIONAL: appropriate; cooperative             LINES, CATHETERS, DRAINS, AIRWAYS, AND WOUNDS   Lines, Drains, and Airways:  Wounds (agree with documentation and present on admission):  Peripheral IV (Adult) 08/01/22 Distal;Posterior;Right Forearm (Active)   Number of days: 2       External Urinary Catheter Female (one size) (Active)   Number of days: 0         Comments:      LABS / IMAGING / TELE      Labs  Results from last 7 days   Lab Units 07/31/22  2124   WBC K/uL 8.76   HEMOGLOBIN g/dL 14.8   HEMATOCRIT % 44.7   PLATELETS K/uL 222     Results from last 7 days   Lab Units 07/31/22  2124   SODIUM mEQ/L 136   POTASSIUM mEQ/L 3.8   CHLORIDE mEQ/L 103   CO2 mEQ/L 24   BUN mg/dL 15   CREATININE mg/dL 0.6   GLUCOSE mg/dL 129*    CALCIUM mg/dL 8.8*       SARS-CoV-2 (COVID-19) (no units)   Date/Time Value   07/31/2022 2158 Negative       Imaging  Knee x-ray reviewed    ASSESSMENT AND PLAN      Left knee pain  Assessment & Plan  Acute left knee pain started with back pain.  Likely referred pain from radiculopathy.  The patient is requesting to get MRI to rule out any malignancy.  Would get x-ray of knee for now       Hypertension  Assessment & Plan  Monitor blood pressure  Continue metoprolol tartrate 12.5 mg twice daily.  Continue triamterene hydrochlorothiazide 37.5/25 mg tablet half tablet daily    * Intractable back pain  Assessment & Plan  Assessment: Concern for lumbar radiculopathy with pain emanating from the lumbar region down the left lower extremity.  Pain has been uncontrolled despite administration of IV Toradol and IV Dilaudid.  No trauma to the back or left lower extremity.  Differentials include lumbar radiculopathy, osseous metastatic disease due to history of breast cancer versus musculoskeletal pain.      Multimodal pain regimen to attempt to control pain.  Suspect there is a neuropathy component of the pain.  Placed on scheduled acetaminophen 650 mg every 6 hours x3 days.  IV Toradol 15 mg every 6 hours around-the-clock.  Oxycodone 5 mg every 3 hours as needed moderate to severe pain.  IV Dilaudid 1 mg every 3 hours for severe breakthrough pain not relieved with oxycodone.  P.o. diazepam 2 mg every 6 hours as needed.  Continue gabapentin 100 mg 4 times daily.  Lidoderm patch applied to the lumbar region.  K pad applied as needed for symptom relief.  Fall precautions.  Patient is on prednisone here.  We will discharge her on Medrol Dosepak.  Pain management consulted for possible epidural injection.   Neurosurgery recommended physical therapy in 4 weeks         VTE Assessment: Padua VTE Score: 4  VTE Prophylaxis:  Current anticoagulants:  heparin (porcine) 5,000 unit/mL injection 5,000 Units, subcutaneous, q8h  INT      Code Status: Full Code  Palliative Care Screening Score: 0   Estimated Discharge Date: 8/4/2022     Disposition Planning:      Suze De Oliveira MD  8/3/2022     Time spent in patient care and coordinating care more than 35 minutes  Discussed with neurosurgery

## 2022-08-03 NOTE — PROGRESS NOTES
Patient: Tamara Omalley  Location:  [facility] 5PAV 5416  MRN:  [id number]  Today's date:  8/3/2022     Start of session: Pt was received supine in bed. Patient medically appropriate to participate in therapy per RN    End of session:    Patient positioned in bed with call bell in reach and bed alarm on  Pt did not report or present with any cardiac or neuro symptoms. RN aware of pt’s performance during therapy      Tamara is a 72 y.o. female admitted on 7/31/2022 with Left sided sciatica [M54.32]  Intractable pain [R52]  Intractable back pain [M54.9]  Ambulatory dysfunction [R26.2]. Principal problem is Intractable back pain.    Past Medical History  Tamara has no past medical history on file.    History of Present Illness  left lower extremity pain that started this morning and has gotten progressively worse.  States that the pain is worse with ambulation.  Limited range of movement.  Associated with back pain that is located in the lumbar area.  Pain is described as sharp, stabbing and shooting pain that runs down from the back to the left lower extremity.  Pain is constant with no improvement of the intensity.  Denies any alleviating factors.  Exacerbated by abduction, abduction, bearing weight and attempting to ambulate.  Patient attempted to take her oxycodone and Neurontin at home with minimal relief. For possible MRI L/S.    Ortho consult:  72 y.o. female with back and left lower extremity pain in setting of far lateral L3/4 disc herniation and mild left knee OA     - No acute orthopaedic surgical intervention  - Management of lumbar disc herniation per neurosurgery  - Recommend rest, ice, elevation, and compression for left knee as needed  - NSAIDs/analgesia as indicated  - WBAT LLE    Neuro consult  No neurosurgical intervention recommended.       PT Vitals    Date/Time Pulse SpO2 Pt Activity O2 Therapy BP BP Method Pt Position Who   08/03/22 0728 72 95 % At rest None (Room air) 149/70 Automatic  Lying LA      PT Pain    Date/Time Pain Type Side/Orientation Location Rating: Rest Rating: Activity Interventions [facility]   08/03/22 0728 Pain Assessment left knee 7 10 position adjusted LA          Prior Living Environment    Flowsheet Row Most Recent Value   Current Living Arrangements home   Living Environment Comment Pt lives w/ her , 2-3 MAX, split level home, 6 steps up to bed and stall shower. (+) SD on 1F        Prior Level of Function    Flowsheet Row Most Recent Value   Dominant Hand right   Ambulation independent   Transferring independent   Toileting independent   Bathing independent   Dressing independent   Eating independent   Communication understands/communicates without difficulty   Prior Level of Function Comment Per pt report she was IND w/ all ADLs and ambulation w/o AD   Assistive Device Currently Used at Home none           PT Evaluation and Treatment - 08/03/22 0728        PT Time Calculation    Start Time 0728     Stop Time 0743     Time Calculation (min) 15 min        Session Details    Document Type daily treatment/progress note     Mode of Treatment physical therapy        General Information    Patient Profile Reviewed yes     Onset of Illness/Injury or Date of Surgery 07/31/22     Referring Physician Dr De Oliveira     Patient/Family/Caregiver Comments/Observations RN cleared pt for therapy, nsg tech in room     General Observations of Patient Received supine in bed awake and alert     Existing Precautions/Restrictions fall;spinal;weight bearing        Weight-bearing Status    Left LE Weight-Bearing Status weight-bearing as tolerated (WBAT)        Cognition/Psychosocial    Affect/Mental Status (Cognition) flat/blunted affect;anxious     Behavioral Issues (Cognition) difficulty managing stress     Orientation Status (Cognition) oriented x 3     Follows Commands (Cognition) follows three-step commands     Cognitive Function attention deficit     Attention Deficit (Cognition) moderate  deficit;perseverates on recent conversation;requires cues/redirection to task     Comment, Cognition pt reporting worsening L knee pain, states she now wants an MRI, per ortho and neuro no surgical intervention warranted at this time, pt requires cues to redirect attention        Sensory Assessment (Somatosensory)    Sensory Assessment (Somatosensory) LE sensation intact        Range of Motion (ROM)    Range of Motion left lower extremity;right lower extremity     Left Lower Extremity (ROM) left LE ROM is WFL except;knee     Knee, Left (ROM) unable to assess due to pain     Right Lower Extremity (ROM) right LE ROM is WFL        Strength (Manual Muscle Testing)    Hip, Left (Strength) 4/5     Knee, Left (Strength) unable to test due to pain, grossly 3/5     Ankle, Left (Strength) 5/5     Hip, Right (Strength) 4/5     Knee, Right (Strength) 5/5     Ankle, Right (Strength) 5/5        Bed Mobility    Bed Mobility bed mobility (all) activities     Hughes, Supine to Sit minimum assist (75% or more patient effort)     Hughes, Sit to Supine close supervision     Assistive Device bed rails;head of bed elevated     Comment (Bed Mobility) bed exit to the R, pt reporting pain with movement of L knee, heavy use of bed rails needed to sit upright, increased time, effortful, able to sit upright without (A) at EOB        Transfers    Transfers other (see comments)     Comment pt refused stating she wants an MRI of her L knee and is in too much pain        Sit to Stand Transfer    Hughes, Sit to Stand Transfer unable to assess     Comment pt refused stating she wants an MRI of her L knee and is in too much pain        Stand to Sit Transfer    Hughes, Stand to Sit Transfer unable to assess     Comment pt refused stating she wants an MRI of her L knee and is in too much pain        Gait Training    Hughes, Gait unable to assess     Comment (Gait/Stairs) please see above under transfer section         Stairs Training    Crane, Stairs unable to assess        Safety Issues, Functional Mobility    Impairments Affecting Function (Mobility) pain;range of motion (ROM)        Balance    Balance Assessment sitting static balance;sit to stand dynamic balance;standing static balance;standing dynamic balance     Static Sitting Balance WFL;supported;sitting, edge of bed     Sit to Stand Dynamic Balance unable to perform activity     Static Standing Balance unable to perform activity     Dynamic Standing Balance unable to perform activity     Comment, Balance pt refused to std from EOB due to pain and wanting an MRI of her L knee        AM-PAC (TM) - Mobility (Current Function)    Turning from your back to your side while in a flat bed without using bedrails? 3 - A Little     Moving from lying on your back to sitting on the side of a flat bed without using bedrails? 3 - A Little     Moving to and from a bed to a chair? 3 - A Little     Standing up from a chair using your arms? 3 - A Little     To walk in a hospital room? 3 - A Little     Climbing 3-5 steps with a railing? 2 - A Lot     AM-PAC () Mobility Score 17        Assessment/Plan (PT)    Daily Outcome Statement 8/3/22 Pt is a 72 year old female who presents from home due to back pain. MRI of lumbar spine done- no intervention warranted per ortho and neurosurgery. Pt seen for PT session. Pt now endorsing L knee pain. She is adament on receiving an MRI for L knee. Pt refused to get OOB during session- sat at EOB only stating her pain is too much and she wants an MRI first. Anticipate as pt's pain improves her mobility will improve. Pt likely will be able to return home with spouse at [place] with home PT     Rehab Potential fair, will monitor progress closely     Therapy Frequency 3-5 times/wk     Planned Therapy Interventions balance training;bed mobility training;gait training;transfer training;stair training               PT Assessment/Plan    Flowsheet Row Most  Recent Value   PT Recommended Discharge Disposition home with assistance, home with home health at 08/03/2022 0728   Anticipated Equipment Needs at Discharge (PT) walker, front-wheeled at 08/03/2022 0728   Patient/Family Therapy Goals Statement to go home at d/c and have  assist at 08/01/2022 1449                    Education Documentation  Unresolved/Worsening Symptoms, taught by Cherry Crowell PT at 8/3/2022  8:25 AM.  Learner: Patient  Readiness: Acceptance  Method: Explanation, Demonstration  Response: Needs Reinforcement, Demonstrated Understanding, Verbalizes Understanding  Comment: reviewed PT POC, role of PT, safety with mobility    Joint Mobility/Strength, taught by Cherry Crowell PT at 8/3/2022  8:25 AM.  Learner: Patient  Readiness: Acceptance  Method: Explanation, Demonstration  Response: Needs Reinforcement, Demonstrated Understanding, Verbalizes Understanding  Comment: reviewed PT POC, role of PT, safety with mobility    Home Safety, taught by Cherry Crowell PT at 8/3/2022  8:25 AM.  Learner: Patient  Readiness: Acceptance  Method: Explanation, Demonstration  Response: Needs Reinforcement, Demonstrated Understanding, Verbalizes Understanding  Comment: reviewed PT POC, role of PT, safety with mobility    Energy Conservation, taught by Cherry Crowell PT at 8/3/2022  8:25 AM.  Learner: Patient  Readiness: Acceptance  Method: Explanation, Demonstration  Response: Needs Reinforcement, Demonstrated Understanding, Verbalizes Understanding  Comment: reviewed PT POC, role of PT, safety with mobility    Assistive/Adaptive Devices, taught by Cherry Crowell PT at 8/3/2022  8:25 AM.  Learner: Patient  Readiness: Acceptance  Method: Explanation, Demonstration  Response: Needs Reinforcement, Demonstrated Understanding, Verbalizes Understanding  Comment: reviewed PT POC, role of PT, safety with mobility    Signs/Symptoms, taught by Cherry Crowell PT at 8/3/2022  8:25 AM.  Learner:  Patient  Readiness: Acceptance  Method: Explanation, Demonstration  Response: Needs Reinforcement, Demonstrated Understanding, Verbalizes Understanding  Comment: reviewed PT POC, role of PT, safety with mobility    Risk Factors, taught by Cherry Crowell PT at 8/3/2022  8:25 AM.  Learner: Patient  Readiness: Acceptance  Method: Explanation, Demonstration  Response: Needs Reinforcement, Demonstrated Understanding, Verbalizes Understanding  Comment: reviewed PT POC, role of PT, safety with mobility          PT Goals    Flowsheet Row Most Recent Value   Bed Mobility Goal 1    Activity/Assistive Device bed mobility activities, all at 08/01/2022 1449   [place] independent at 08/01/2022 1449   Transfer Goal 1    Activity/Assistive Device all transfers, walker, front-wheeled at 08/01/2022 1449   [place] supervision required at 08/01/2022 1449   Time Frame by discharge at 08/01/2022 1449   Progress/Outcome goal ongoing at 08/01/2022 1449   Gait Training Goal 1    Activity/Assistive Device gait (walking locomotion), walker, front-wheeled at 08/01/2022 1449   [place] modified independence at 08/01/2022 1449   Distance 50 at 08/01/2022 1449   Time Frame by discharge at 08/01/2022 1449   Progress/Outcome goal ongoing at 08/01/2022 1449   Stairs Goal 1    Activity/Assistive Device stairs, all skills, using handrail, right at 08/01/2022 1449   [place] supervision required at 08/01/2022 1449   Number of Stairs 6 at 08/01/2022 1449   Time Frame by discharge at 08/01/2022 1449   Progress/Outcome goal ongoing at 08/01/2022 1449

## 2022-08-03 NOTE — NURSING NOTE
Pt states pain has started increasing to Left knee over the past day. Dr. Pickard paged and made aware. Awaiting orders.

## 2022-08-03 NOTE — PROGRESS NOTES
Patient:  Tamara Omalley  Location:  [facility] [id number]  MRN:  [id number]  Today's date:  8/3/2022     Start: RN aware and cleared for therapy. R'cved in bed , agreeable to OT session    End:  Pt left in bed w/ alarm activated. Call bell and personal items within reach. NAD and VSS. Nurse notified of session progress/outcome.    Cannot tolerate sitting upright, therefore returned to bed      Tamara is a 72 y.o. female admitted on 7/31/2022 with Left sided sciatica [M54.32]  Intractable pain [R52]  Intractable back pain [M54.9]  Ambulatory dysfunction [R26.2]. Principal problem is Intractable back pain.    Past Medical History  Tamara has no past medical history on file.    History of Present Illness  left lower extremity pain that started this morning and has gotten progressively worse.  States that the pain is worse with ambulation.  Limited range of movement.  Associated with back pain that is located in the lumbar area.  Pain is described as sharp, stabbing and shooting pain that runs down from the back to the left lower extremity.  Pain is constant with no improvement of the intensity.  Denies any alleviating factors.  Exacerbated by abduction, abduction, bearing weight and attempting to ambulate.  Patient attempted to take her oxycodone and Neurontin at home with minimal relief. For possible MRI L/S.    Ortho consult:  72 y.o. female with back and left lower extremity pain in setting of far lateral L3/4 disc herniation and mild left knee OA     - No acute orthopaedic surgical intervention  - Management of lumbar disc herniation per neurosurgery  - Recommend rest, ice, elevation, and compression for left knee as needed  - NSAIDs/analgesia as indicated  - WBAT LLE    Neuro consult  No neurosurgical intervention recommended.       OT Vitals    Date/Time Pulse BP BP Location BP Method Pt Position Who   08/03/22 1444 73 124/65 Left upper arm Automatic Lying VTV      OT Pain    Date/Time Pain Type  Side/Orientation Location Rating: Rest Rating: Activity Interventions [facility]   08/03/22 1444 Pain Assessment;Pain Reassessment left knee 3 8 position adjusted VTV          Prior Living Environment    Flowsheet Row Most Recent Value   Current Living Arrangements home   Living Environment Comment Pt lives w/ her , 2-3 MAX, split level home, 6 steps up to bed and stall shower. (+) [facility] on 1F        Prior Level of Function    Flowsheet Row Most Recent Value   Dominant Hand right   Ambulation independent   Transferring independent   Toileting independent   Bathing independent   Dressing independent   Eating independent   Communication understands/communicates without difficulty   Prior Level of Function Comment Per pt report she was IND w/ all ADLs and ambulation w/o AD   Assistive Device Currently Used at Home none        Occupational Profile    Flowsheet Row Most Recent Value   Reason for Services/Referral Pt adm w/ intractable back pain that is radiating to LLE   Successful Occupations Retired    Environmental Supports and Barriers From home w/            OT Evaluation and Treatment - 08/03/22 1444        OT Time Calculation    Start Time 1444     Stop Time 1507     Time Calculation (min) 23 min        Session Details    Document Type daily treatment/progress note     Mode of Treatment occupational therapy        General Information    Patient Profile Reviewed yes     Patient/Family/Caregiver Comments/Observations RN aware and cleared for therapy     General Observations of Patient Pt r'cved in bed, agreeable to OT session     Existing Precautions/Restrictions fall;spinal        Cognition/Psychosocial    Affect/Mental Status (Cognition) WFL     Orientation Status (Cognition) oriented x 3     Follows Commands (Cognition) follows one-step commands     Comment, Cognition Pt overall cooperative but limited by c/o severe pain.        Bed Mobility    [place], Supine to Sit 1 person assist;minimum  assist (75% or more patient effort)     [place], Sit to Supine 1 person assist;minimum assist (75% or more patient effort)     Assistive Device head of bed elevated;bed rails     Comment (Bed Mobility) OOB to R. Pt required MIN A x for RLE and completeness of push to sit. Plus MIN Ax1 back to bed for LLE (A). Pt unable to tolerate EOB sitting or any short periods of sititng upright. C/o incr severe pain with sitting position.        Transfers    Transfers other (see comments)     Comment Pt essentially sidelying on EOB 2* unable to tolerate sitting. Pushing straight from sidelying/leaning to stand at CLS-steadying (A). Completed short distance func mobility w/ RW. ~12 ft x2 within hosp room, to window and back. Declining further OOB activity 2* severe pain. Declining toileting and grooming tasks at this time        Sit to Stand Transfer    [place], Sit to Stand Transfer close supervision     Assistive Device walker, front-wheeled     Comment From EOB. Eager to push to stand 2* pain in sitting        Stand to Sit Transfer    [place], Stand to Sit Transfer close supervision     Assistive Device walker, front-wheeled     Comment To EOB.        Toilet Transfer    Comment offered/declined. Bhavin (+)        Safety Issues, Functional Mobility    Impairments Affecting Function (Mobility) endurance/activity tolerance;balance;pain;range of motion (ROM)        Balance    Balance Assessment sitting static balance;sitting dynamic balance;sit to stand dynamic balance;standing static balance;standing dynamic balance     Static Sitting Balance mild impairment;sitting, edge of bed     Dynamic Sitting Balance mild impairment;sitting, edge of bed     Sit to Stand Dynamic Balance mild impairment;unsupported     Static Standing Balance mild impairment;unsupported     Dynamic Standing Balance mild impairment;unsupported     Comment, Balance Ax1 for safe OOB activity w/ RW. No overt LOB noted. Limited by pain and dcr  activity tolerance.        Therapeutic Exercise    Therapeutic Exercise lower extremity        Lower Extremity (Therapeutic Exercise)    Exercise Position/Type supine     General Exercise ankle pumps;quad sets;heel slides     Reps and Sets x10     Comment Edu and encouragement to complete simple LE Vasquez while lying supine. Pt able to demonstrate ability to complete LE Vasquez withouth pain. Edu on benefits of LE mobility for blood clot prevention, strengthing, prevent joint stiffness, etc. Pt receptive to provided edu and recs        Lower Body Dressing    Tasks socks     [place] maximum assist (25-49% patient effort)     Position supine     Comment 2* pain        Toileting    [place] dependent (less than 25% patient effort)     Comment (+) purewick in place        AM-PAC (TM) - ADL (Current Function)    Putting on and taking off regular lower body clothing? 2 - A Lot     Bathing? 2 - A Lot     Toileting? 2 - A Lot     Putting on/taking off regular upper body clothing? 4 - None     How much help for taking care of personal grooming? 3 - A Little     Eating meals? 4 - None     AM-PAC (TM) ADL Score 17        Assessment/Plan (OT)    Daily Outcome Statement OT tx session completed. Pt still limited by significant pain but able to tolerate short distance func mobility w/ RW within hosp room. MIN A x1 for bed mobility, CLS for STS and func mobility w/ RW, MAX A for LBD, and DEP toileting via purewick. Pt limited by c/o severe pain, dcr activity tolerance, and balance. Provided w/ comprehensive edu on LE Vasquez to complete in bed as well as benefits of use RW to off load weight on affected LE. Pt receptive to all provided edu and recs. OT to cont w/ POC. Rec d/c home w/ (A) and HH OT pending progress               OT Assessment/Plan    Flowsheet Row Most Recent Value   OT Recommended Discharge Disposition home with assistance, home with home health at 08/03/2022 2089   Anticipated Equipment Needs At Discharge (OT) other  (see comments)  [TBD at next level of care] at 08/01/2022 1450   Patient/Family Therapy Goal Statement To return home at d/c at 08/01/2022 1450                         OT Goals    Flowsheet Row Most Recent Value   Bed Mobility Goal 1    Activity/Assistive Device bed mobility activities, all at 08/01/2022 1450   [place] supervision required at 08/01/2022 1450   Time Frame by discharge at 08/01/2022 1450   Progress/Outcome goal ongoing at 08/01/2022 1450   Transfer Goal 1    Activity/Assistive Device all transfers at 08/01/2022 1450   [place] supervision required at 08/01/2022 1450   Time Frame by discharge at 08/01/2022 1450   Progress/Outcome goal ongoing at 08/01/2022 1450   Dressing Goal 1    Activity/Adaptive Equipment lower body dressing at 08/01/2022 1450   [place] minimum assist (75% or more patient effort) at 08/01/2022 1450   Time Frame by discharge at 08/01/2022 1450   Progress/Outcome goal ongoing at 08/01/2022 1450   Toileting Goal 1    Activity/Assistive Device toileting skills, all at 08/01/2022 1450   [place] supervision required at 08/01/2022 1450   Time Frame by discharge at 08/01/2022 1450   Progress/Outcome goal ongoing at 08/01/2022 1450

## 2022-08-03 NOTE — PLAN OF CARE
Plan of Care Review  Plan of Care Reviewed With: patient  Progress: no change  Outcome Summary: Pt not OOB this shift, refused to get OOB due to Left knee pain. Reports decrease in level of pain following PRN oxycodone administration. Pain management consult ordered, waiting for Dr. Estrada to see pt.

## 2022-08-03 NOTE — CONSULTS
Orthopaedic Surgery Consult    CC: Left lower extremity pain    SUBJECTIVE   HPI: Tamara Omalley is a 72 y.o. female with PMHx of remotely treated breast cancer (Diagnosed in 2010, treated with lumpectomy/radiation/anastrozole) and HLD who presented to [facility] ED on 7/31/22 due to atraumatic onset of left lateral knee/leg pain and left lower back pain radiating to left hip and left lateral thigh down to knee which has persisted since onset approximately 3 days ago. Has not had similar symptoms in the past prior to 3 days ago. Pt reports initially being able to ambulate at home but has had more difficulty over time. Still able to ambulate in the hospital with mild pain, and pain is worst when sitting down to use the restroom. Denies recent falls or injury to the left leg. Denies numbness/tingling/weakness or bowel/bladder incontinence.     MRI of the lumbar spine obtained which demonstrated multilevel stenosis and far lateral disc herniation at L3-4 > neurosurgery consulted who recommended non-operative treatment. Orthopaedics consulted for further evaluation of left leg pain.      Anticoagulation: Denies  Baseline ambulatory status: Unassisted ambulator    PMH:  Remote breast cancer  HLD  HTN  Anal fissure  Osteopenia      PSH:  Breast lumpectomy  R distal radius ORIF      Meds:  No current facility-administered medications on file prior to encounter.     Current Outpatient Medications on File Prior to Encounter   Medication Sig Dispense Refill   • aspirin 81 mg enteric coated tablet Takes every other day.     • gabapentin (NEURONTIN) 100 mg capsule TAKE 1 CAPSULE BY MOUTH 4 TIMES A DAY     • gabapentin (NEURONTIN) 300 mg capsule TAKE 1 CAPSULE BY MOUTH 4 TIMES A DAY     • metoprolol tartrate (LOPRESSOR) 25 mg tablet Take 12.5 mg by mouth.     • oxyCODONE (ROXICODONE) 5 mg immediate release tablet Take 5 mg by mouth 2 (two) times a day.     • polyethylene glycol (MIRALAX) 17 gram/dose powder Take 17 g by mouth.     •  rosuvastatin (CRESTOR) 10 mg tablet Take 10 mg by mouth once daily.     • triamterene-hydrochlorothiazide (MAXZIDE-25) 37.5-25 mg per tablet Take 0.5 tablets by mouth once daily.     • valACYclovir (VALTREX) 1 gram tablet TAKE TWO TABLETS BY MOUTH TWICE A DAY AS NEEDED     • zolpidem (AMBIEN) 10 mg tablet TAKE 1 TABLET BY MOUTH DAILY AT BEDTIME AS NEEDED FOR SLEEP.         Allergies:   Allergies   Allergen Reactions   • Anastrozole      Other reaction(s): Arthralgias, Myalgias  Pain in finger joints  Pain in finger joints     • Iodine And Iodide Containing Products Rash     Topical Iodine  Topical Iodine         SH:   EtOH: Occasionally  Smoking status: Denies  Drugs: Denies  Occupation: Lives in a multilevel house with her . Retired  Social History     Socioeconomic History   • Marital status:      Spouse name: Not on file   • Number of children: Not on file   • Years of education: Not on file   • Highest education level: Not on file   Occupational History   • Not on file   Tobacco Use   • Smoking status: Never Smoker   • Smokeless tobacco: Never Used   Substance and Sexual Activity   • Alcohol use: Not Currently   • Drug use: Never   • Sexual activity: Defer   Other Topics Concern   • Not on file   Social History Narrative   • Not on file     Social Determinants of Health     Financial Resource Strain: Not on file   Food Insecurity: No Food Insecurity   • Worried About Running Out of Food in the Last Year: Never true   • Ran Out of Food in the Last Year: Never true   Transportation Needs: Not on file   Physical Activity: Not on file   Stress: Not on file   Social Connections: Not on file   Intimate Partner Violence: Not on file   Housing Stability: Not on file           ROS:  CV: Denies CP or Palpitations.  Pulm: Denies SOB or Pain with inspiration      OBJECTIVE  Vitals:    08/02/22 1518   BP: 112/62   Pulse:    Resp:    Temp:    SpO2:        CBC Results       07/31/22 08/02/20     2124 1045    WBC  8.76 9.91    RBC 4.80 4.88    HGB 14.8 14.9    HCT 44.7 43.9    MCV 93.1 90.0    MCH 30.8 30.5    MCHC 33.1 33.9     202          Physical Exam:    General  No acute distress  AAOx3    RLE:  Inspection: Mild swelling/bursitis to lateral knee. No gross deformity. Skin in tact.   Neurovascular: Palpable dorsal pedis Pulse. Sensation to light touch in Sural, Saphenous, Tibial, superficial peroneal nerve, and deep peroneal nerve Distibutions  Motor:  Fires iliopsoas, Quadriceps, Tibialis Anterior, extensor hallucis longus, gastrocnemius-soleus  Palpation:  No pain to palpation. Compartments soft and compressible  ROM: Full Painless range of motion of knee and hip    LLE  Inspection: Mild swelling/bursitis to lateral knee symmetric to contralateral side. No gross deformity. Skin in tact.   Neurovascular: Palpable dorsal pedis Pulse. Sensation to light touch in Sural, Saphenous, Tibial, superficial peroneal nerve, and deep peroneal nerve Distibutions  Motor:  Fires iliopsoas, Quadriceps, Tibialis Anterior, extensor hallucis longus, gastrocnemius-soleus  Palpation:  Mild TTP lateral joint line of knee. Otherwise noo pain to palpation throughout remainder of lower extremity. Compartments soft and compressible  ROM: Full Painless range of motion of knee and hip      Imaging:  I have reviewed the Imaging from the last 24 hrs.    XR left knee demonstrate mild degenerative changes to left knee and evidence of chondrocalcinosis. Otherwise no acute osseous abnormalities    MRI L spine which demonstrates multilevel degenerative changes and far lateral L3/4 disc herniation    ASSESSMENT & PLAN   72 y.o. female with back and left lower extremity pain in setting of far lateral L3/4 disc herniation and mild left knee OA    - No acute orthopaedic surgical intervention  - Management of lumbar disc herniation per neurosurgery  - Recommend rest, ice, elevation, and compression for left knee as needed  - NSAIDs/analgesia as  indicated  - WBAT LLE  - Follow-up as needed with [facility] Non-operative [facility] Medicine team for further evaluation of left knee as an outpatient  - Please call with questions or concerns      Rowdy Figueroa MD  Orthopaedic Surgery, PGY-3

## 2022-08-03 NOTE — PLAN OF CARE
Plan of Care Review  Plan of Care Reviewed With: patient  Progress: improving  Outcome Summary: AAOx4 Pt having severe L knee pain. Pt states oxy is the only medication that works to alleviate pain. Pt standby assist with walker. Safety protocols maintained.

## 2022-08-03 NOTE — PROGRESS NOTES
Physical Therapy recommending home PT. Neuro Surgery consult recommending Physical Therapy in 4 Weeks. Notified Dr Villar. Requested Neuro make decision. Contacted Dr Nixon's office. They will call back with decision.

## 2022-08-03 NOTE — PROGRESS NOTES
No overnight events. Patient endorses concern over Toradol recall that she saw online.     Patient without headache, nausea, vomiting, visual changes, new paresthesias or weakness.     Temp:  [36.5 °C (97.7 °F)-36.7 °C (98 °F)] 36.7 °C (98 °F)  Heart Rate:  [60-72] 72  Resp:  [16-18] 16  BP: (112-149)/(62-70) 149/70  SpO2:  [92 %-95 %] 95 %  Oxygen Therapy: None (Room air)     Intake/Output     None          General: lying supine in bed in no acute distress    Neuro: A&O x 3, interacting appr with fluent speech  Cn: EOMI, no facial asymmetry, tongue midline  Motor: without pronator drift  Deltoid Biceps Triceps Wrist ext Hand  Finger Spread Hip flexion Knee ext Dorsi-  flexion EHL Plantar Flexion    L 5 5 5 5 5 5 4PL 4-PL 5 5 5   R 5 5 5 5 5 5 5 5 5 5 5      Sensation: Intact and equal to light touch all 4 ext      Scheduled Meds:  • aspirin  81 mg oral Daily   • gabapentin  100 mg oral QID   • heparin (porcine)  5,000 Units subcutaneous q8h INT   • ketorolac  15 mg intravenous q6h INT   • lidocaine  1 patch Topical Daily   • metoprolol tartrate  12.5 mg oral BID   • polyethylene glycol  17 g oral Daily   • predniSONE  60 mg oral Daily   • rosuvastatin  10 mg oral Daily (6a)   • senna  2 tablet oral Nightly   • sennosides-docusate sodium  1 tablet oral BID   • tiZANidine  2 mg oral TID   • triamterene-hydrochlorothiazide  0.5 tablet oral Daily (6a)     Continuous Infusions:  PRN Meds:.•  ondansetron ODT  •  oxyCODONE  •  zolpidem    CBC Results       07/31/22 08/02/20     2124 1045    WBC 8.76 9.91    RBC 4.80 4.88    HGB 14.8 14.9    HCT 44.7 43.9    MCV 93.1 90.0    MCH 30.8 30.5    MCHC 33.1 33.9     202        BMP Results       07/31/22 08/02/20 08/12/19 2124 1044 1502     137 --    K 3.8 3.9 --    Cl 103 103 --    CO2 24 25 --    Glucose 129 94 --    BUN 15 18 --    Creatinine 0.6 0.9 --    Calcium 8.8 9.1 9.4    Anion Gap 9 9 --    EGFR >60.0 >60.0 --         Comment for K at 2124 on  07/31/22: Results obtained on plasma. Plasma Potassium values may be up to 0.4 mEQ/L less than serum values. The differences may be greater for patients with high platelet or white cell counts.    Comment for K at 1044 on 08/02/20:   Results obtained on plasma. Plasma Potassium values may be up to 0.4 mEQ/L less than serum values. The differences may be greater for patients with high platelet or white cell counts.              Microbiology Results     Procedure Component Value Units Date/Time    SARS-CoV-2 (COVID-19), PCR Nasopharynx [[id number]]  (Normal) Collected: 07/31/22 2158    Specimen: Nasopharyngeal Swab from Nasopharynx Updated: 07/31/22 2232    Narrative:      The following orders were created for panel order SARS-CoV-2 (COVID-19), PCR Nasopharynx.  Procedure                               Abnormality         Status                     ---------                               -----------         ------                     SARS-CoV-2 (COVID-19), P...[[id number]]  Normal              Final result                 Please view results for these tests on the individual orders.    SARS-CoV-2 (COVID-19), PCR Nasopharynx [[id number]]  (Normal) Collected: 07/31/22 2158    Specimen: Nasopharyngeal Swab from Nasopharynx Updated: 07/31/22 2232     SARS-CoV-2 (COVID-19) Negative    Narrative:      Testing performed using real-time PCR for detection of COVID-19. EUA approved validation studies performed on site.           Imaging:   MRI LUMBAR 8/1/22  CLINICAL HISTORY:  Myelopathy, acute, lumbar spine worsening low back pain.  Patient presents with back and left leg pain.     COMMENT:  Comparison: Lumbar spine radiograph dated 7/31/2022..  Technique: Multiplanar MR imaging of the lumbar spine was performed without  intravenous contrast.     FINDINGS:     Normal lumbar lordosis is maintained. Vertebral body heights are maintained. No  subluxation. Degenerative endplate fatty marrow changes are noted in the lumbar  spine,  sacrum, and iliac bones. There is diffuse intervertebral disc desiccation  with disc space narrowing most prominent at L5-S1. No prevertebral or  paravertebral soft tissue edema. Small hemangioma is noted along the superior  endplate of the L3 vertebra. Endplate degenerative changes are most prominent at  L5-S1. Lumbar spinal canal is patent. Scattered Schmorl's nodes are noted.     The conus terminates at the L1 level. The distal cord is normal in size and  signal characteristics. No abnormal vertebral body or intrathecal enhancement is  identified. However, there is enhancement noted in the left L3-L4 neural  foramina likely associated with the acutely herniated disc.     Level by level assessment:     T12-L1: No disc herniation. No central canal or foraminal narrowing.     L1-L2: Broad-based disc bulge with left foraminal extension. Mild facet  hypertrophy. Mild left foraminal narrowing. No central canal or right foraminal  narrowing.     L2-L3: Broad-based disc bulge with left foraminal extension. Facet hypertrophy  and ligamentum flavum infolding.  Fluid in the right greater than left facet  joints. Mild bilateral foraminal narrowing.  No central canal narrowing.     L3-L4: Broad-based disc bulge with a left foraminal disc protrusion/extrusion  with  facet hypertrophy and ligamentum flavum infolding.  Moderate right and  moderate to severe left foraminal narrowing.  Left foraminal disc  protrusion/extrusion contacts the exiting nerve root within the L3-L4 neural  foramina. No central canal narrowing.     L4-L5: Broad-based disc bulge with left greater than right foraminal extension.  Central annular fissure is noted. Mild facet hypertrophy and ligamentum flavum  infolding.  Mild right and  Moderate left foraminal narrowing.  Mild central  canal narrowing.     L5-S1: Broad-based disc osteophyte complex  with facet hypertrophy and  ligamentum flavum infolding.  Mild right and  Moderate left foraminal  narrowing.  Mild central canal narrowing.     Extra vertebral soft tissues: Within normal limits..  --  IMPRESSION:  1.  Multilevel lumbar degenerative changes without severe central canal  narrowing.  2.  Left foraminal disc herniation with focal protrusion/extrusion and mild  enhancement is noted contacting the exiting nerve root in the left L3-L4 neural  foramina.     Images were personally reviewed by myself and Dr Nixon     Assessment & Plan:   In summary, Tamara Omalley is a 72 y.o. female with a past medical history of breast carcinoma, proctalgia fugax, hyperlipidemia, hypertension, osteopenia and anal fissure who presents with left lower extremity pain that started 7/31/22 and has gotten progressively worse. She reports left inferior knee and shin pain as well as left sided lower back pain that radiates down her lateral left leg to just below her knee. Patient made the decision to come to the hospital for further evaluation and upon EMS arrival she was given a dose fentanyl 25 mcg with some relief of the pain. Patient has been administered multiple doses of IV Dilaudid and IV Toradol with continued uncontrolled pain. X-ray of the lumbar spine and left hip with degenerative joint disease seen but no acute fractures or abnormalities.      MRI of the lumbar spine reviewed and demonstrates multilevel lumbar degenerative changes and small left foraminal disc herniation at L3-L4. Patient's biggest concern of her inferior knee/anterior shin pain does not likely align with the rest of her radicular pain. However her left radicular pain likely due to her L3 compression. Patient should rest and receive course of steroids. If no improvement in 4-5 days, Pain management should be consulted to evaluate for injections. No neurosurgical intervention recommended.      - Medrol dose pack, should demonstrate improvement in 3-4 weeks   - Pain Medicine consult to evaluate for possible transforaminal injections   - PT in 4  weeks   - Patient seen and discused with Dr Hussain Figueroa PA-C

## 2022-08-03 NOTE — TELEPHONE ENCOUNTER
Janna a nurse with [facility] called, she wants to know is it okay for patient to have at home PT?

## 2022-08-03 NOTE — PATIENT CARE CONFERENCE
Care Progression Rounds Note  Date: 8/3/2022  Time: 12:05 PM     Patient Name: Tamara Omalley     Medical Record Number: [id number]   YOB: 1950  Sex: Female      Room/Bed: 5416    Admitting Diagnosis: Left sided sciatica [M54.32]  Intractable pain [R52]  Intractable back pain [M54.9]  Ambulatory dysfunction [R26.2]   Admit Date/Time: 7/31/2022  5:47 PM    Primary Diagnosis: Intractable back pain  Principal Problem: Intractable back pain    GMLOS: 2.9  Anticipated Discharge Date: 8/4/2022    AM-PAC:  Mobility Score: 17    Discharge Planning:  Current Living Arrangements: home  Concerns to be Addressed: discharge planning  Anticipated Discharge Disposition: home with assistance    Barriers to Discharge:  Medical issues not resolved    Comments:  Await decision from Neuro Surgery regarding Home PT.    Participants:  advanced practice provider, , physical therapy, nursing, social work/services

## 2022-08-04 LAB
ATRIAL RATE: 68
P AXIS: 71
PR INTERVAL: 138
QRS DURATION: 86
QT INTERVAL: 392
QTC CALCULATION(BAZETT): 416
R AXIS: 48
T WAVE AXIS: 64
VENTRICULAR RATE: 68

## 2022-08-04 PROCEDURE — 63600000 HC DRUGS/DETAIL CODE: Performed by: HOSPITALIST

## 2022-08-04 PROCEDURE — 63700000 HC SELF-ADMINISTRABLE DRUG: Performed by: HOSPITALIST

## 2022-08-04 PROCEDURE — 12000000 HC ROOM AND CARE MED/SURG

## 2022-08-04 PROCEDURE — 63700000 HC SELF-ADMINISTRABLE DRUG: Performed by: INTERNAL MEDICINE

## 2022-08-04 PROCEDURE — 99233 SBSQ HOSP IP/OBS HIGH 50: CPT | Performed by: HOSPITALIST

## 2022-08-04 RX ORDER — METHYLPREDNISOLONE 4 MG/1
4 TABLET ORAL NIGHTLY
Status: DISCONTINUED | OUTPATIENT
Start: 2022-08-07 | End: 2022-08-04

## 2022-08-04 RX ORDER — METHYLPREDNISOLONE 4 MG/1
4 TABLET ORAL
Status: DISCONTINUED | OUTPATIENT
Start: 2022-08-10 | End: 2022-08-05 | Stop reason: HOSPADM

## 2022-08-04 RX ORDER — METHYLPREDNISOLONE 4 MG/1
4 TABLET ORAL
Status: DISCONTINUED | OUTPATIENT
Start: 2022-08-08 | End: 2022-08-05 | Stop reason: HOSPADM

## 2022-08-04 RX ORDER — ACETAMINOPHEN 325 MG/1
650 TABLET ORAL 4 TIMES DAILY
Status: DISCONTINUED | OUTPATIENT
Start: 2022-08-04 | End: 2022-08-05 | Stop reason: HOSPADM

## 2022-08-04 RX ORDER — METHYLPREDNISOLONE 4 MG/1
4 TABLET ORAL
Status: DISCONTINUED | OUTPATIENT
Start: 2022-08-07 | End: 2022-08-04

## 2022-08-04 RX ORDER — METHYLPREDNISOLONE 4 MG/1
4 TABLET ORAL
Status: DISCONTINUED | OUTPATIENT
Start: 2022-08-05 | End: 2022-08-05 | Stop reason: HOSPADM

## 2022-08-04 RX ORDER — GABAPENTIN 100 MG/1
200 CAPSULE ORAL 2 TIMES DAILY
Status: DISCONTINUED | OUTPATIENT
Start: 2022-08-04 | End: 2022-08-04

## 2022-08-04 RX ORDER — METHYLPREDNISOLONE 4 MG/1
4 TABLET ORAL
Status: DISCONTINUED | OUTPATIENT
Start: 2022-08-06 | End: 2022-08-05 | Stop reason: HOSPADM

## 2022-08-04 RX ORDER — METHYLPREDNISOLONE 4 MG/1
4 TABLET ORAL
Status: DISCONTINUED | OUTPATIENT
Start: 2022-08-08 | End: 2022-08-04

## 2022-08-04 RX ORDER — METHYLPREDNISOLONE 4 MG/1
8 TABLET ORAL ONCE
Status: DISCONTINUED | OUTPATIENT
Start: 2022-08-04 | End: 2022-08-04

## 2022-08-04 RX ORDER — BISACODYL 10 MG/1
10 SUPPOSITORY RECTAL DAILY PRN
Status: DISCONTINUED | OUTPATIENT
Start: 2022-08-04 | End: 2022-08-05 | Stop reason: HOSPADM

## 2022-08-04 RX ORDER — METHYLPREDNISOLONE 4 MG/1
4 TABLET ORAL
Status: DISCONTINUED | OUTPATIENT
Start: 2022-08-05 | End: 2022-08-04

## 2022-08-04 RX ORDER — METHYLPREDNISOLONE 4 MG/1
4 TABLET ORAL NIGHTLY
Status: DISCONTINUED | OUTPATIENT
Start: 2022-08-08 | End: 2022-08-05 | Stop reason: HOSPADM

## 2022-08-04 RX ORDER — METHYLPREDNISOLONE 4 MG/1
4 TABLET ORAL
Status: DISCONTINUED | OUTPATIENT
Start: 2022-08-06 | End: 2022-08-04

## 2022-08-04 RX ORDER — METHYLPREDNISOLONE 4 MG/1
4 TABLET ORAL ONCE
Status: DISCONTINUED | OUTPATIENT
Start: 2022-08-04 | End: 2022-08-04

## 2022-08-04 RX ORDER — METHYLPREDNISOLONE 4 MG/1
4 TABLET ORAL NIGHTLY
Status: DISCONTINUED | OUTPATIENT
Start: 2022-08-06 | End: 2022-08-04

## 2022-08-04 RX ORDER — GABAPENTIN 100 MG/1
100 CAPSULE ORAL 4 TIMES DAILY
Status: DISCONTINUED | OUTPATIENT
Start: 2022-08-04 | End: 2022-08-04

## 2022-08-04 RX ORDER — METHYLPREDNISOLONE 4 MG/1
4 TABLET ORAL
Status: DISCONTINUED | OUTPATIENT
Start: 2022-08-07 | End: 2022-08-05 | Stop reason: HOSPADM

## 2022-08-04 RX ORDER — METHYLPREDNISOLONE 4 MG/1
8 TABLET ORAL NIGHTLY
Status: DISCONTINUED | OUTPATIENT
Start: 2022-08-05 | End: 2022-08-05 | Stop reason: HOSPADM

## 2022-08-04 RX ORDER — METHYLPREDNISOLONE 4 MG/1
4 TABLET ORAL
Status: DISCONTINUED | OUTPATIENT
Start: 2022-08-09 | End: 2022-08-05 | Stop reason: HOSPADM

## 2022-08-04 RX ORDER — METHYLPREDNISOLONE 4 MG/1
4 TABLET ORAL NIGHTLY
Status: DISCONTINUED | OUTPATIENT
Start: 2022-08-09 | End: 2022-08-05 | Stop reason: HOSPADM

## 2022-08-04 RX ORDER — METHYLPREDNISOLONE 4 MG/1
8 TABLET ORAL NIGHTLY
Status: DISCONTINUED | OUTPATIENT
Start: 2022-08-04 | End: 2022-08-04

## 2022-08-04 RX ORDER — METHYLPREDNISOLONE 4 MG/1
4 TABLET ORAL NIGHTLY
Status: DISCONTINUED | OUTPATIENT
Start: 2022-08-07 | End: 2022-08-05 | Stop reason: HOSPADM

## 2022-08-04 RX ORDER — ENOXAPARIN SODIUM 100 MG/ML
40 INJECTION SUBCUTANEOUS
Status: DISCONTINUED | OUTPATIENT
Start: 2022-08-04 | End: 2022-08-05 | Stop reason: HOSPADM

## 2022-08-04 RX ORDER — METHYLPREDNISOLONE 4 MG/1
4 TABLET ORAL
Status: DISCONTINUED | OUTPATIENT
Start: 2022-08-09 | End: 2022-08-04

## 2022-08-04 RX ORDER — METHYLPREDNISOLONE 4 MG/1
8 TABLET ORAL
Status: DISCONTINUED | OUTPATIENT
Start: 2022-08-05 | End: 2022-08-05 | Stop reason: HOSPADM

## 2022-08-04 RX ORDER — METHYLPREDNISOLONE 4 MG/1
8 TABLET ORAL NIGHTLY
Status: DISCONTINUED | OUTPATIENT
Start: 2022-08-05 | End: 2022-08-04

## 2022-08-04 RX ORDER — GABAPENTIN 400 MG/1
400 CAPSULE ORAL 4 TIMES DAILY
Status: DISCONTINUED | OUTPATIENT
Start: 2022-08-04 | End: 2022-08-05 | Stop reason: HOSPADM

## 2022-08-04 RX ORDER — METHYLPREDNISOLONE 4 MG/1
4 TABLET ORAL NIGHTLY
Status: DISCONTINUED | OUTPATIENT
Start: 2022-08-08 | End: 2022-08-04

## 2022-08-04 RX ORDER — METHYLPREDNISOLONE 4 MG/1
8 TABLET ORAL NIGHTLY
Status: DISCONTINUED | OUTPATIENT
Start: 2022-08-06 | End: 2022-08-05 | Stop reason: HOSPADM

## 2022-08-04 RX ADMIN — HEPARIN SODIUM 5000 UNITS: 5000 INJECTION, SOLUTION INTRAVENOUS; SUBCUTANEOUS at 05:44

## 2022-08-04 RX ADMIN — OXYCODONE HYDROCHLORIDE 10 MG: 5 TABLET ORAL at 18:23

## 2022-08-04 RX ADMIN — OXYCODONE HYDROCHLORIDE 10 MG: 5 TABLET ORAL at 08:03

## 2022-08-04 RX ADMIN — ACETAMINOPHEN 650 MG: 325 TABLET ORAL at 18:23

## 2022-08-04 RX ADMIN — ASPIRIN 81 MG: 81 TABLET, COATED ORAL at 08:03

## 2022-08-04 RX ADMIN — TIZANIDINE 2 MG: 2 TABLET ORAL at 14:19

## 2022-08-04 RX ADMIN — GABAPENTIN 200 MG: 100 CAPSULE ORAL at 10:32

## 2022-08-04 RX ADMIN — ZOLPIDEM TARTRATE 10 MG: 5 TABLET, COATED ORAL at 23:00

## 2022-08-04 RX ADMIN — ROSUVASTATIN CALCIUM 10 MG: 10 TABLET, FILM COATED ORAL at 18:23

## 2022-08-04 RX ADMIN — GABAPENTIN 400 MG: 400 CAPSULE ORAL at 18:23

## 2022-08-04 RX ADMIN — TRIAMTERENE AND HYDROCHLOROTHIAZIDE 0.5 TABLET: 37.5; 25 TABLET ORAL at 05:44

## 2022-08-04 RX ADMIN — TIZANIDINE 2 MG: 2 TABLET ORAL at 08:03

## 2022-08-04 RX ADMIN — BISACODYL 10 MG: 10 SUPPOSITORY RECTAL at 10:33

## 2022-08-04 RX ADMIN — OXYCODONE HYDROCHLORIDE 10 MG: 5 TABLET ORAL at 14:19

## 2022-08-04 RX ADMIN — OXYCODONE HYDROCHLORIDE 10 MG: 5 TABLET ORAL at 03:31

## 2022-08-04 RX ADMIN — TIZANIDINE 2 MG: 2 TABLET ORAL at 20:32

## 2022-08-04 RX ADMIN — POLYETHYLENE GLYCOL 3350 17 G: 17 POWDER, FOR SOLUTION ORAL at 08:03

## 2022-08-04 RX ADMIN — SENNOSIDES AND DOCUSATE SODIUM 1 TABLET: 50; 8.6 TABLET ORAL at 08:03

## 2022-08-04 RX ADMIN — ENOXAPARIN SODIUM 40 MG: 40 INJECTION SUBCUTANEOUS at 18:23

## 2022-08-04 RX ADMIN — SENNOSIDES AND DOCUSATE SODIUM 1 TABLET: 50; 8.6 TABLET ORAL at 20:32

## 2022-08-04 RX ADMIN — PREDNISONE 60 MG: 20 TABLET ORAL at 08:03

## 2022-08-04 NOTE — PLAN OF CARE
Plan of Care Review  Plan of Care Reviewed With: patient  Progress: improving  Outcome Summary: Pt OOB to bathroom, passing flatus no BM, Pt taking oxy 10mg for pain control, discharge planning onve medically stable.

## 2022-08-04 NOTE — PROGRESS NOTES
Received call from Neuro Surgery patient cleared for   home PT. Attempted to meet with patient. She states she can not talk at this time. Will attempt again at  another time.

## 2022-08-04 NOTE — NURSING NOTE
Dr. Brooks Estrada notified of consult. Per Dr. Estrada, pt is to call his office when discharged to schedule outpatient appointment. Pt and spouse updated.    normal (ped)...

## 2022-08-04 NOTE — PROGRESS NOTES
Patient: Tamara Omalley  Location: [facility] 5PAV 5416  MRN:  [id number]  Today's date:  8/4/2022    Attempted to see patient for therapy. Unable due to patient refused (Attmpeted this am refused therapy session. When asked how she is doing pts visitor reported she is in 11/10 pain. Pt and visitor mildly agitated during attempt for therapy session. Pt and visitor broght medication into hospital, their own supository. RN aware and managing medications. Will re-attempt as able.).      14:45- attempted patient again for second time today. Reported feeling overwhelmed and increased pain. Refused to work with therapy team for second time today. Therapy will continue to follow pt's progress as able.

## 2022-08-04 NOTE — PROGRESS NOTES
Patient: Tamara Omalley  Location: [facility] 5PAV 5416  MRN:  [id number]  Today's date:  8/4/2022    Attempted to see patient for therapy. Unable due to patient refused (Attempted to see pt this PM. However, pt anxious, frustrated, and in pain. declining therapy at this time. OT to cont to follow and reattempt tomorrow).

## 2022-08-04 NOTE — PATIENT CARE CONFERENCE
Care Progression Rounds Note  Date: 8/4/2022  Time: 1:59 PM     Patient Name: Tamara Omalley     Medical Record Number: [id number]   YOB: 1950  Sex: Female      Room/Bed: 5416    Admitting Diagnosis: Left sided sciatica [M54.32]  Intractable pain [R52]  Intractable back pain [M54.9]  Ambulatory dysfunction [R26.2]   Admit Date/Time: 7/31/2022  5:47 PM    Primary Diagnosis: Intractable back pain  Principal Problem: Intractable back pain    GMLOS: 2.9  Anticipated Discharge Date: 8/5/2022    AM-PAC:  Mobility Score: 17    Discharge Planning:  Current Living Arrangements: home  Concerns to be Addressed: discharge planning  Anticipated Discharge Disposition: home with assistance, home with home health    Barriers to Discharge:  Medical issues not resolved    Comments:       Participants:  advanced practice provider, , physical therapy, nursing, social work/services

## 2022-08-04 NOTE — PLAN OF CARE
Plan of Care Review  Plan of Care Reviewed With: patient  Progress: no change  Outcome Summary: Pt refused to get OOB this shift due to left leg pain. Pt reports having 2 BMs this shift (not observed by this RN). Dr. Estrada to evaluate pt tomorrow morning.

## 2022-08-05 ENCOUNTER — DOCUMENTATION (OUTPATIENT)
Dept: ANESTHESIOLOGY | Facility: HOSPITAL | Age: 72
End: 2022-08-05
Payer: COMMERCIAL

## 2022-08-05 VITALS
TEMPERATURE: 97.7 F | SYSTOLIC BLOOD PRESSURE: 121 MMHG | OXYGEN SATURATION: 95 % | WEIGHT: 123.02 LBS | HEART RATE: 78 BPM | RESPIRATION RATE: 16 BRPM | BODY MASS INDEX: 22.64 KG/M2 | DIASTOLIC BLOOD PRESSURE: 70 MMHG | HEIGHT: 62 IN

## 2022-08-05 PROCEDURE — 63700000 HC SELF-ADMINISTRABLE DRUG: Performed by: HOSPITALIST

## 2022-08-05 PROCEDURE — 99239 HOSP IP/OBS DSCHRG MGMT >30: CPT | Performed by: HOSPITALIST

## 2022-08-05 RX ORDER — OXYCODONE HYDROCHLORIDE 5 MG/1
10 TABLET ORAL EVERY 8 HOURS PRN
Qty: 12 TABLET | Refills: 0 | Status: SHIPPED | OUTPATIENT
Start: 2022-08-05 | End: 2022-08-17

## 2022-08-05 RX ORDER — TIZANIDINE 2 MG/1
2 TABLET ORAL 3 TIMES DAILY
Qty: 21 TABLET | Refills: 0 | Status: SHIPPED | OUTPATIENT
Start: 2022-08-05 | End: 2022-08-12

## 2022-08-05 RX ORDER — METHYLPREDNISOLONE 4 MG/1
TABLET ORAL
Qty: 21 TABLET | Refills: 0 | Status: SHIPPED | OUTPATIENT
Start: 2022-08-05 | End: 2022-08-12

## 2022-08-05 RX ORDER — LIDOCAINE 560 MG/1
1 PATCH PERCUTANEOUS; TOPICAL; TRANSDERMAL DAILY
Qty: 30 PATCH | Refills: 0 | Status: SHIPPED | OUTPATIENT
Start: 2022-08-05 | End: 2022-10-27

## 2022-08-05 RX ORDER — ACETAMINOPHEN 325 MG/1
650 TABLET ORAL 4 TIMES DAILY
Qty: 240 TABLET | Refills: 0
Start: 2022-08-05 | End: 2022-09-04

## 2022-08-05 RX ORDER — IBUPROFEN 600 MG/1
600 TABLET ORAL 4 TIMES DAILY PRN
Qty: 90 TABLET | Refills: 0
Start: 2022-08-05 | End: 2023-12-02 | Stop reason: ENTERED-IN-ERROR

## 2022-08-05 RX ADMIN — TRIAMTERENE AND HYDROCHLOROTHIAZIDE 0.5 TABLET: 37.5; 25 TABLET ORAL at 05:05

## 2022-08-05 RX ADMIN — GABAPENTIN 400 MG: 400 CAPSULE ORAL at 08:46

## 2022-08-05 RX ADMIN — OXYCODONE HYDROCHLORIDE 10 MG: 5 TABLET ORAL at 12:38

## 2022-08-05 RX ADMIN — GABAPENTIN 400 MG: 400 CAPSULE ORAL at 12:38

## 2022-08-05 RX ADMIN — TIZANIDINE 2 MG: 2 TABLET ORAL at 08:46

## 2022-08-05 RX ADMIN — SENNOSIDES AND DOCUSATE SODIUM 1 TABLET: 50; 8.6 TABLET ORAL at 08:47

## 2022-08-05 RX ADMIN — TIZANIDINE 2 MG: 2 TABLET ORAL at 12:38

## 2022-08-05 RX ADMIN — POLYETHYLENE GLYCOL 3350 17 G: 17 POWDER, FOR SOLUTION ORAL at 08:47

## 2022-08-05 RX ADMIN — ASPIRIN 81 MG: 81 TABLET, COATED ORAL at 08:46

## 2022-08-05 RX ADMIN — OXYCODONE HYDROCHLORIDE 10 MG: 5 TABLET ORAL at 05:15

## 2022-08-05 RX ADMIN — OXYCODONE HYDROCHLORIDE 10 MG: 5 TABLET ORAL at 09:36

## 2022-08-05 NOTE — PROGRESS NOTES
Inpatient consultation    Admitting Diagnosis: No admission diagnoses are documented for this encounter.    HPI  Patient is a 72 y.o. female new onset back pain left leg pain with associated weakness left side with limitation of sitting standing and walking    Medical History: No past medical history on file.  Hypertension  Surgical History: No past surgical history on file.    Allergies: Anastrozole and Iodine and iodide containing products    No current facility-administered medications for this visit.     Facility-Administered Medications Ordered in Other Visits   Medication Dose Route Frequency Provider Last Rate Last Admin   • acetaminophen (TYLENOL) tablet 650 mg  650 mg oral QID Octavia Frank MD   650 mg at 08/04/22 1823   • aspirin enteric coated tablet 81 mg  81 mg oral Daily Aditi Nava MD   81 mg at 08/04/22 0803   • bisacodyL (DULCOLAX) 10 mg suppository 10 mg  10 mg rectal Daily PRN Octavia Frank MD   10 mg at 08/04/22 1033   • enoxaparin (LOVENOX) syringe 40 mg  40 mg subcutaneous Daily (6p) Octavia Frank MD   40 mg at 08/04/22 1823   • gabapentin (NEURONTIN) capsule 400 mg  400 mg oral QID Octavia Frank MD   400 mg at 08/04/22 1823   • ketorolac (TORADOL) injection 15 mg  15 mg intravenous q6h INT Aditi Nava MD   15 mg at 08/02/22 0524   • lidocaine (ASPERCREME) 4 % topical patch 1 patch  1 patch Topical Daily Aditi Nava MD   1 patch at 08/03/22 1752   • methylPREDNISolone (MEDROL) tablet 4 mg  4 mg oral Daily with lunch Octavia Frank MD       • methylPREDNISolone (MEDROL) tablet 4 mg  4 mg oral Daily with dinner Octavia Frank MD       • [START ON 8/6/2022] methylPREDNISolone (MEDROL) tablet 4 mg  4 mg oral Daily with breakfast Octavia Frank MD       • [START ON 8/6/2022] methylPREDNISolone (MEDROL) tablet 4 mg  4 mg oral Daily with lunch Octavia Frank MD       • [START ON 8/6/2022] methylPREDNISolone  (MEDROL) tablet 4 mg  4 mg oral Daily with dinner Octavia Frank MD       • [START ON 8/7/2022] methylPREDNISolone (MEDROL) tablet 4 mg  4 mg oral Daily with breakfast Octavia Frank MD       • [START ON 8/7/2022] methylPREDNISolone (MEDROL) tablet 4 mg  4 mg oral Daily with lunch Octavia Frank MD       • [START ON 8/7/2022] methylPREDNISolone (MEDROL) tablet 4 mg  4 mg oral Daily with dinner Octavia Frank MD       • [START ON 8/7/2022] methylPREDNISolone (MEDROL) tablet 4 mg  4 mg oral Nightly Octavia Frank MD       • [START ON 8/8/2022] methylPREDNISolone (MEDROL) tablet 4 mg  4 mg oral Daily with breakfast Octavia Frank MD       • [START ON 8/8/2022] methylPREDNISolone (MEDROL) tablet 4 mg  4 mg oral Daily with lunch Octavia Frank MD       • [START ON 8/8/2022] methylPREDNISolone (MEDROL) tablet 4 mg  4 mg oral Nightly Octavia Frank MD       • [START ON 8/9/2022] methylPREDNISolone (MEDROL) tablet 4 mg  4 mg oral Daily with breakfast Octavia Frank MD       • [START ON 8/9/2022] methylPREDNISolone (MEDROL) tablet 4 mg  4 mg oral Nightly Octavia Frank MD       • [START ON 8/10/2022] methylPREDNISolone (MEDROL) tablet 4 mg  4 mg oral Daily with breakfast Octavia Frank MD       • methylPREDNISolone (MEDROL) tablet 8 mg  8 mg oral Daily with breakfast Octavia Frank MD       • methylPREDNISolone (MEDROL) tablet 8 mg  8 mg oral Nightly Octavia Frank MD       • [START ON 8/6/2022] methylPREDNISolone (MEDROL) tablet 8 mg  8 mg oral Nightly Octavia Frank MD       • metoprolol tartrate (LOPRESSOR) split tablet 12.5 mg  12.5 mg oral BID Aditi Nava MD       • ondansetron ODT (ZOFRAN-ODT) disintegrating tablet 4 mg  4 mg oral q8h PRN Suze De Oliveira MD   4 mg at 08/02/22 1249   • oxyCODONE (ROXICODONE) immediate release tablet 10 mg  10 mg oral q3h PRN Aditi Nava MD   10  mg at 08/05/22 0515   • polyethylene glycol (MIRALAX) 17 gram packet 17 g  17 g oral Daily Aditi Nava MD   17 g at 08/04/22 0803   • rosuvastatin (CRESTOR) tablet 10 mg  10 mg oral Daily (6a) Aditi Nava MD   10 mg at 08/04/22 1823   • sennosides-docusate sodium (SENOKOT-S) 8.6-50 mg per tablet 1 tablet  1 tablet oral BID Aditi Nava MD   1 tablet at 08/04/22 2032   • tiZANidine (ZANAFLEX) tablet 2 mg  2 mg oral TID Suze De Oliveira MD   2 mg at 08/04/22 2032   • triamterene-hydrochlorothiazide (MAXZIDE-25) 37.5-25 mg per tablet 0.5 tablet  0.5 tablet oral Daily (6a) Aditi Nava MD   0.5 tablet at 08/05/22 0505   • zolpidem (AMBIEN) tablet 10 mg  10 mg oral Nightly PRN Yanira Steven DO   10 mg at 08/04/22 2300       Social History:   Social History     Socioeconomic History   • Marital status:    Tobacco Use   • Smoking status: Never Smoker   • Smokeless tobacco: Never Used   Substance and Sexual Activity   • Alcohol use: Not Currently   • Drug use: Never   • Sexual activity: Defer     Social Determinants of Health     Food Insecurity: No Food Insecurity   • Worried About Running Out of Food in the Last Year: Never true   • Ran Out of Food in the Last Year: Never true       Family History: No family history on file.    Review of Systems  All other systems reviewed and negative except as noted in the HPI.    Objective     Vital Signs for the last 24 hours:  Temp:  [36.7 °C (98 °F)-36.7 °C (98.1 °F)] 36.7 °C (98 °F)  Heart Rate:  [64-94] 94  Resp:  [16-18] 18  BP: (116-156)/(65-81) 138/81      There were no vitals taken for this visit.    General Appearance:    Alert, cooperative, no distress, appears stated age   Head:    Normocephalic, without obvious abnormality, atraumatic   Eyes:    PERRL, conjunctiva/corneas clear, EOM's intact, fundi     benign, both eyes   Ears:    Normal TM's and external ear canals, both ears   Nose:   Nares normal, septum midline, mucosa normal, no drainage     or      sinus tenderness   Throat:   Lips, mucosa, and tongue normal; teeth and gums normal   Neck:   Supple, symmetrical, trachea midline, no adenopathy;     thyroid:  no enlargement/tenderness/nodules; no carotid    bruit or JVD   Back:     Symmetric, no curvature, ROM normal, no CVA tenderness   Lungs:     Clear to auscultation bilaterally, respirations unlabored   Chest Wall:    No tenderness or deformity   Heart:    Regular rate and rhythm, S1 and S2 normal, no murmur, rub or          gallop   Breast Exam:     Abdomen:     Soft, non-tender, bowel sounds active all four quadrants,     no masses, no organomegaly   Genitalia:    Normal female without lesion, discharge or tenderness       Extremities:   Extremities normal, atraumatic, no cyanosis or edema; positive straight leg raise left side 30 degrees   Musculoskeletal:  Pulses:   No injury or deformity; 4-5 left hip flexion; 5 5 EHL plantarflexion    2+ and symmetric all extremities   Skin:   Skin color, texture, turgor normal, no rashes or lesions   Lymph nodes:   Cervical, supraclavicular, and axillary nodes normal   Neurologic:    Behavior/  Emotional:   CNII-XII intact, normal strength, sensation and reflexes     throughout    Appropriate, cooperative       Labs   I have reviewed the patient's pertinent labs. Pertinent labs are within normal limits.    Imaging  I have independently reviewed the pertinent imaging from the last 24 hrs.  I viewed MRI of L-spine which reveals far lateral herniation L3-4 and Modic or inflammatory endplate changes identified L5-S1 and central foraminal stenosis identified L3-4 L4-5    ECG/Telemetry  I have independently reviewed the telemetry. No events for the last 24 hours.    Assessment/Plan     Lumbar disc disease with herniation  Lumbar radiculopathy  Lumbar spondylosis  Vertebrogenicdisease      1.  We will set up as an outpatient lumbar transforaminal injection L3-L4.  Cannot be done due to Lovenox use will arrange as an  outpatient    2.  With inflammatory changes of endplates identified at L5-S1 type II and pain with sitting and standing patient may be a consideration for basilar vertebral ablation or intracept procedure at L5 and S1      VTE Assessment: Padua      Code Status: [unfilled]  Estimated discharge date:

## 2022-08-05 NOTE — PATIENT CARE CONFERENCE
Care Progression Rounds Note  Date: 8/5/2022  Time: 11:03 AM     Patient Name: Tamara Omalley     Medical Record Number: [id number]   YOB: 1950  Sex: Female      Room/Bed: 5416    Admitting Diagnosis: Left sided sciatica [M54.32]  Intractable pain [R52]  Intractable back pain [M54.9]  Ambulatory dysfunction [R26.2]   Admit Date/Time: 7/31/2022  5:47 PM    Primary Diagnosis: Intractable back pain  Principal Problem: Intractable back pain    GMLOS: 2.9  Anticipated Discharge Date: 8/5/2022    AM-PAC:  Mobility Score: 17    Discharge Planning:  Current Living Arrangements: home  Concerns to be Addressed: discharge planning  Anticipated Discharge Disposition: home with home health    Barriers to Discharge:  None    Comments:       Participants:  advanced practice provider, , physical therapy, nursing, social work/services

## 2022-08-05 NOTE — PROGRESS NOTES
Met with patient at bedside. IMM reviewed and copy of notice at bedside. Agreeable to [facility] RN/PT. Referral given to [facility]. Also gave patient info on [facility] Private [facility]. Patient aware she may use any home care company of her choice.

## 2022-08-05 NOTE — PROGRESS NOTES
Referral per CM for RN, PT, OT, -RN will eval for [facility].  Discussed intermittent services w patient and spouse.  She understands that home care does not provided incontinent products or Pure Wick device/ she has research cost of same on line.  Decline bedside commode, states spouse will assist when she is able to get out of bed. Expressed concern about transportation-waiting to discuss w CM. Provided w [facility] information and she is aware that she will get phone call to arrange SOC date.

## 2022-08-05 NOTE — NURSING NOTE
Pt IV removed, no medications to return to pt at discharge. Discharge instructions gone over with patient as well as , questions answered. Pt called pharmacy to confirm medications. Pt to be picked up by [facility] for transport home via stretcher.

## 2022-08-05 NOTE — PLAN OF CARE
Plan of Care Review  Plan of Care Reviewed With: patient  Progress: no change  Outcome Summary: Pt. OOB with assist x1 to bathroom. +BM this shift. Voiding with purewick in bed. Continues to complain of severe pain with ambulation but 3/10 pain at rest. Refusing most medications despite pain. Plan for pain management to see later today.

## 2022-08-05 NOTE — PROGRESS NOTES
Hospital Medicine Service -  Daily Progress Note       SUBJECTIVE   Patient and  very upset today because epidural injection was not done. Spoke with Dr Estrada who will be able to come tomorrow around 7am  Initially patient wanted to be transferred, now agreed to stay.   Had a large BM today, first time in 5 days  Severe pain when she sits down  Left knee pain full but full ROM  Much emotional support provided       OBJECTIVE      Vital signs in last 24 hours:  Temp:  [36.6 °C (97.8 °F)-36.7 °C (98.1 °F)] 36.7 °C (98.1 °F)  Heart Rate:  [64-83] 83  Resp:  [16-18] 16  BP: (128-156)/(68-76) 133/68    Intake/Output Summary (Last 24 hours) at 8/4/2022 2134  Last data filed at 8/4/2022 1823  Gross per 24 hour   Intake 120 ml   Output 2350 ml   Net -2230 ml       PHYSICAL EXAMINATION      Physical Exam GEN: Thin female in not in acute distress  HEENT: normocephalic; atraumatic  EYES: EOMI PERRLA Vinacal clear no discharge  NECK: no JVD; neck supple  CARDIO: regular rate and rhythm; no murmurs or rubs  RESP: clear to auscultation bilaterally; no rales, rhonchi, or wheezes  ABD: soft, non-distended, non-tender, normal bowel sounds  EXT: no cyanosis or edema.  No swelling, tenderness increased temperature of left knee  SKIN: No Rash exposed skin  MUSCULOSKELETAL: no injury or deformity  NEURO: alert and oriented x 3; nonfocal  BEHAVIOR/EMOTIONAL: appropriate; cooperative             LINES, CATHETERS, DRAINS, AIRWAYS, AND WOUNDS   Lines, Drains, and Airways:  Wounds (agree with documentation and present on admission):  Peripheral IV (Adult) 08/01/22 Distal;Posterior;Right Forearm (Active)   Number of days: 2       External Urinary Catheter Female (one size) (Active)   Number of days: 0         Comments:      LABS / IMAGING / TELE      Labs  Results from last 7 days   Lab Units 07/31/22  2124   WBC K/uL 8.76   HEMOGLOBIN g/dL 14.8   HEMATOCRIT % 44.7   PLATELETS K/uL 222     Results from last 7 days   Lab Units  07/31/22 2124   SODIUM mEQ/L 136   POTASSIUM mEQ/L 3.8   CHLORIDE mEQ/L 103   CO2 mEQ/L 24   BUN mg/dL 15   CREATININE mg/dL 0.6   GLUCOSE mg/dL 129*   CALCIUM mg/dL 8.8*       SARS-CoV-2 (COVID-19) (no units)   Date/Time Value   07/31/2022 2158 Negative       Imaging  Knee x-ray reviewed    ASSESSMENT AND PLAN      Left knee pain  Assessment & Plan  Acute left knee pain started with back pain.  Likely referred pain from back pain  Xray unremarkable   Appreciate input from orthopedics  The patient wants to see if epidemiology was relieved her knee pain otherwise she is requesting MRI  I discussed with her in detail, many questions asked and attempted to answere       Hypertension  Assessment & Plan  Monitor blood pressure  Continue metoprolol tartrate 12.5 mg twice daily.  Continue triamterene hydrochlorothiazide 37.5/25 mg tablet half tablet daily    * Intractable back pain  Assessment & Plan  Assessment: Concern for lumbar radiculopathy with pain emanating from the lumbar region down the left lower extremity.  Pain has been uncontrolled despite administration of IV Toradol and IV Dilaudid.  No trauma to the back or left lower extremity.  Differentials include lumbar radiculopathy, osseous metastatic disease due to history of breast cancer versus musculoskeletal pain.      Multimodal pain regimen to attempt to control pain.  Suspect there is a neuropathy component of the pain.  Placed on scheduled acetaminophen 650 mg every 6 hours x3 days.  IV Toradol 15 mg every 6 hours around-the-clock.  Oxycodone 5 mg every 3 hours as needed moderate to severe pain.  IV Dilaudid 1 mg every 3 hours for severe breakthrough pain not relieved with oxycodone.  P.o. diazepam 2 mg every 6 hours as needed.  Continue gabapentin 100 mg 4 times daily.  Lidoderm patch applied to the lumbar region.  K pad applied as needed for symptom relief.  Fall precautions.  Patient is on prednisone here.  We will discharge her on Medrol Dosepak.  Pain  management consulted for possible epidural injection. Dr Estrada will come tomorrow  Hopefully can be discharged after that  Neurosurgery recommended physical therapy in 4 weeks       VTE Assessment: Padua VTE Score: 4  VTE Prophylaxis:  Current anticoagulants:  enoxaparin (LOVENOX) syringe 40 mg, subcutaneous, Daily (6p)      Code Status: Full Code  Palliative Care Screening Score: 0   Estimated Discharge Date: 8/5/2022     Disposition Planning: DC home     Octavia Spencer MD  8/4/2022     Spent more than 35 minutes with patient evaluation, discussion of current conditions with patient, RN and Case management, counseling and planning care.

## 2022-08-05 NOTE — PROGRESS NOTES
Patient requesting [facility] to transport home. Called [facility] private pay cost is 279.69 $. Patient and  aware of cost. Aware [facility] will bill them.  Ambulance arranged for 1 PM. Nursing aware.

## 2022-08-15 ENCOUNTER — TELEPHONE (OUTPATIENT)
Dept: NEUROSURGERY | Facility: CLINIC | Age: 72
End: 2022-08-15
Payer: COMMERCIAL

## 2022-08-15 NOTE — TELEPHONE ENCOUNTER
Patient states she underwent injection with 5 days improvement of symptoms.  States her pain has since returned.  She has called Dr. Estrada's office and is awaiting a return phone call.  Recommended she follow up with Dr. Estrada and offered a follow up appointment with Dr. Nixon.  She does not want to come to the office at this point secondary to her pain.  Discussed the surgical intervention within the first 3 months of symptoms is reserved for weakness or intractable pain despite conservative measures.

## 2022-08-23 NOTE — DISCHARGE SUMMARY
[facility] Medicine Service -  Inpatient Discharge Summary        BRIEF OVERVIEW   Admitting Provider: Suze De Oliveira MD  Attending Provider: No att. providers found Attending phys phone: N/A    PCP: Tova Vargas [phone]    Admission Date: 7/31/2022  Discharge Date: 8/05/2022     DISCHARGE DIAGNOSES      Primary Discharge Diagnosis  Intractable back pain    Secondary Discharge Diagnoses  Active Hospital Problems    Diagnosis Date Noted   • Left sided sciatica 08/02/2022   • Left knee pain 08/02/2022   • Hypertension 08/01/2022   • Intractable back pain 07/31/2022      Resolved Hospital Problems   No resolved problems to display.       Problem List on Day of Discharge  Left knee pain  Assessment & Plan  Acute left knee pain started with back pain.  Likely referred pain from back pain  Xray unremarkable   Appreciate input from orthopedics  The patient wants to see if epidemiology was relieved her knee pain otherwise she is requesting MRI  I discussed with her in detail, many questions asked and attempted to answere       Hypertension  Assessment & Plan  Monitor blood pressure  Continue metoprolol tartrate 12.5 mg twice daily.  Continue triamterene hydrochlorothiazide 37.5/25 mg tablet half tablet daily    * Intractable back pain  Assessment & Plan  Assessment: Concern for lumbar radiculopathy with pain emanating from the lumbar region down the left lower extremity.  Pain has been uncontrolled despite administration of IV Toradol and IV Dilaudid.  No trauma to the back or left lower extremity.  Differentials include lumbar radiculopathy, osseous metastatic disease due to history of breast cancer versus musculoskeletal pain.      Multimodal pain regimen to attempt to control pain.  Suspect there is a neuropathy component of the pain.  Placed on scheduled acetaminophen 650 mg every 6 hours x3 days.  IV Toradol 15 mg every 6 hours around-the-clock, hesitant about taking ibuprofen as  outpatient  Oxycodone 5 mg every 3 hours as needed moderate to severe pain.  IV Dilaudid 1 mg every 3 hours for severe breakthrough pain not relieved with oxycodone while house  P.o. diazepam 2 mg every 6 hours as needed.  Continue gabapentin 100 mg 4 times daily.  Lidoderm patch applied to the lumbar region.  K pad applied as needed for symptom relief.  Fall precautions.  Patient is on prednisone here.  We will discharge her on Medrol Dosepak.  Pain management consulted for possible epidural injection. Dr Estrada evaluated and recommended arranging outpatient epidural after discharge  Neurosurgery recommended physical therapy in 4 weeks      SUMMARY OF HOSPITALIZATION      Presenting Problem/History of Present Illness  FROM [facility]:  Tamara Omalley is a 72 y.o. female with a past medical history of in the setting of breast carcinoma, proctalgia fugax, hyperlipidemia, hypertension, osteopenia and history of anal fissure who presents with left lower extremity pain that started this morning and has gotten progressively worse.  States that the pain is worse with ambulation.  Limited range of movement.  Associated with back pain that is located in the lumbar area.  Pain is described as sharp, stabbing and shooting pain that runs down from the back to the left lower extremity.  Pain is constant with no improvement of the intensity.  Denies any alleviating factors.  Exacerbated by abduction, abduction, bearing weight and attempting to ambulate.  Patient attempted to take her oxycodone and Neurontin at home with minimal relief.  Denies any fevers, chills, weakness affecting the upper or lower extremities, paresthesias affecting the genital and sacral area.  Denies any urinary incontinence, urinary retention, fecal incontinence or fecal retention.  Denies any falls or trauma to the back.  Patient made the decision to come to the hospital for further evaluation and upon EMS arrival she was given a dose fentanyl 25 mcg with some  relief of the pain.      X-ray of the lumbar spine and left hip have been obtained with degenerative joint disease seen but no acute fractures or abnormalities.  Upon review of records it appears the patient has had some issues with back pain and left hip pain for which she has received imaging in the past due to concern for possible metastatic disease in the setting of history of breast cancer.     Patient has been administered multiple doses of IV Dilaudid and IV Toradol with continued uncontrolled pain.     Patient denies any chest pain, palpitations, shortness of breath, pain with deep respirations, abdominal pain, epigastric pain, lightheadedness, dizziness, diarrhea, constipation, dysuria or lower extremity swelling.  Exam on Day of Discharge  Physical Exam  GENERAL APPEARANCE  Awake, alert    MUCUS MEMBRANES  Moist    LUNGS  CTAB, no w/r/r    CHEST  RRR, no m/g/r    ABDOMEN  Soft, NT, ND, +BS    EXTREMITIES  No c/c/e    SKIN  No obvious rash    HEENT  Anicteric    NEURO  Moving all extremities, Ox3, coherent, no dysarthria      Consults During Admission  IP CONSULT TO NEUROSURGERY  IP CONSULT TO ORTHOPEDIC SURGERY    DISCHARGE MEDICATIONS        Medication List      START taking these medications    acetaminophen 325 mg tablet  Commonly known as: TYLENOL  Take 2 tablets (650 mg total) by mouth 4 (four) times a day. For 3 days then as needed for mild pain  Dose: 650 mg     ibuprofen 600 mg tablet  Commonly known as: MOTRIN  Take 1 tablet (600 mg total) by mouth 4 (four) times a day as needed for mild pain (pain) for up to 4 days.  Dose: 600 mg     lidocaine 4 % adhesive patch,medicated topical patch  Commonly known as: ASPERCREME  Apply 1 patch topically daily. Apply to lumbar back  Dose: 1 patch        CONTINUE taking these medications    aspirin 81 mg enteric coated tablet  Takes every other day.     cholecalciferol (vitamin D3) 400 unit (10 mcg) tablet tablet  Take 1 tablet by mouth See admin instr.  Dose:  1 tablet     * gabapentin 300 mg capsule  Commonly known as: NEURONTIN  TAKE 1 CAPSULE BY MOUTH 4 TIMES A DAY     * gabapentin 100 mg capsule  Commonly known as: NEURONTIN  TAKE 1 CAPSULE BY MOUTH 4 TIMES A DAY     metoprolol tartrate 25 mg tablet  Commonly known as: LOPRESSOR  Take 12.5 mg by mouth.  Dose: 12.5 mg     polyethylene glycol 17 gram/dose powder  Commonly known as: MIRALAX  Take 17 g by mouth.  Dose: 17 g     rosuvastatin 10 mg tablet  Commonly known as: CRESTOR  Take 10 mg by mouth once daily.  Dose: 10 mg     triamterene-hydrochlorothiazide 37.5-25 mg per tablet  Commonly known as: MAXZIDE-25  Take 0.5 tablets by mouth once daily.  Dose: 0.5 tablet     valACYclovir 1 gram tablet  Commonly known as: VALTREX  TAKE TWO TABLETS BY MOUTH TWICE A DAY AS NEEDED     zolpidem 10 mg tablet  Commonly known as: AMBIEN  TAKE 1 TABLET BY MOUTH DAILY AT BEDTIME AS NEEDED FOR SLEEP.         * This list has 2 medication(s) that are the same as other medications prescribed for you. Read the directions carefully, and ask your doctor or other care provider to review them with you.            STOP taking these medications    oxyCODONE 5 mg immediate release tablet  Commonly known as: ROXICODONE        ASK your doctor about these medications    methylPREDNISolone 4 mg tablet  Commonly known as: MEDROL (RACH)  Follow package directions.  Ask about: Should I take this medication?     oxyCODONE 5 mg immediate release tablet  Commonly known as: ROXICODONE  Take 2 tablets (10 mg total) by mouth every 8 (eight) hours as needed for severe pain for up to 12 days.  Dose: 10 mg  Ask about: Should I take this medication?     tiZANidine 2 mg tablet  Commonly known as: ZANAFLEX  Take 1 tablet (2 mg total) by mouth 3 (three) times a day for 7 days.  Dose: 2 mg  Ask about: Should I take this medication?              PROCEDURES / LABS / IMAGING      Operative Procedures  n/a    Pertinent Labs           No lab exists for component: LUIGI                                        Microbiology Results     Procedure Component Value Units Date/Time    SARS-CoV-2 (COVID-19), PCR Nasopharynx [[id number]]  (Normal) Collected: 07/31/22 2158    Specimen: Nasopharyngeal Swab from Nasopharynx Updated: 07/31/22 2232    Narrative:      The following orders were created for panel order SARS-CoV-2 (COVID-19), PCR Nasopharynx.  Procedure                               Abnormality         Status                     ---------                               -----------         ------                     SARS-CoV-2 (COVID-19), P...[[id number]]  Normal              Final result                 Please view results for these tests on the individual orders.    SARS-CoV-2 (COVID-19), PCR Nasopharynx [[id number]]  (Normal) Collected: 07/31/22 2158    Specimen: Nasopharyngeal Swab from Nasopharynx Updated: 07/31/22 2232     SARS-CoV-2 (COVID-19) Negative    Narrative:      Testing performed using real-time PCR for detection of COVID-19. EUA approved validation studies performed on site.              SARS-CoV-2 (COVID-19) (no units)   Date/Time Value   07/31/2022 2158 Negative       Pertinent Imaging  X-RAY LUMBAR SPINE 2 OR 3 VIEWS    Result Date: 8/1/2022  IMPRESSION: No acute fractures seen. Multilevel degenerative disc changes most severe at L5-S1.    X-RAY KNEE LEFT 4+ VIEWS    Result Date: 8/2/2022  IMPRESSION:No evidence of an acute fracture. Limited lateral view COMMENT:4 views left knee performed. Obliquity of the lateral view, limiting evaluation. No evidence of a large joint effusion. Meniscal chondrocalcinosis. No evidence acute fracture or soft tissue swelling.    MRI LUMBAR SPINE WITH AND WITHOUT CONTRAST    Result Date: 8/2/2022  IMPRESSION: 1.  Multilevel lumbar degenerative changes without severe central canal narrowing. 2.  Left foraminal disc herniation with focal protrusion/extrusion and mild enhancement is noted contacting the exiting nerve root in the left L3-L4 neural  foramina.     US venous leg, LL extremity    Result Date: 7/31/2022  IMPRESSION:No evidence of deep venous thrombosis within the left lower extremity.    X-RAY HIP WITH OR WITHOUT PELVIS 2-3 VW LEFT    Result Date: 8/1/2022  IMPRESSION: No acute fracture or dislocation.      OUTPATIENT  FOLLOW-UP / REFERRALS / PENDING TESTS        Outpatient Follow-Up Appointments            In 2 weeks Tawana Nixon MD [facility]          Referrals  No orders of the defined types were placed in this encounter.      Test Results Pending at Discharge  Unresulted Labs (From admission, onward)            None         DISCHARGE DISPOSITION AND DESTINATION      Disposition: Home [facility]  Destination:  Home                            Code Status At Discharge: full code    Physician Order for Life-Sustaining Treatment Document Status      No documents found               Spent more than 35 minutes in patient evaluation, family update, physical evaluation, and  coordination of discharge and care.

## 2022-09-13 ENCOUNTER — TELEPHONE (OUTPATIENT)
Dept: NEUROSURGERY | Facility: CLINIC | Age: 72
End: 2022-09-13
Payer: COMMERCIAL

## 2022-09-19 ENCOUNTER — OFFICE VISIT (OUTPATIENT)
Dept: NEUROSURGERY | Facility: CLINIC | Age: 72
End: 2022-09-19
Payer: COMMERCIAL

## 2022-09-19 VITALS
BODY MASS INDEX: 23.55 KG/M2 | OXYGEN SATURATION: 96 % | SYSTOLIC BLOOD PRESSURE: 136 MMHG | WEIGHT: 128 LBS | HEART RATE: 82 BPM | DIASTOLIC BLOOD PRESSURE: 80 MMHG | HEIGHT: 62 IN

## 2022-09-19 DIAGNOSIS — M54.32 LEFT SIDED SCIATICA: Primary | ICD-10-CM

## 2022-09-19 PROCEDURE — 99214 OFFICE O/P EST MOD 30 MIN: CPT | Performed by: NEUROLOGICAL SURGERY

## 2022-09-19 PROCEDURE — 3008F BODY MASS INDEX DOCD: CPT | Performed by: NEUROLOGICAL SURGERY

## 2022-09-19 RX ORDER — CYCLOBENZAPRINE HCL 5 MG
5 TABLET ORAL 3 TIMES DAILY PRN
Qty: 90 TABLET | Refills: 0 | Status: SHIPPED | OUTPATIENT
Start: 2022-09-19 | End: 2023-12-02 | Stop reason: ENTERED-IN-ERROR

## 2022-09-19 ASSESSMENT — PAIN SCALES - GENERAL: PAINLEVEL: 5

## 2022-09-19 NOTE — LETTER
2022     Tova Vargas MD  [address]  [address]  [place] [address]    Patient: Tamara Omalley  YOB: 1950  Date of Visit: 2022      Dear Dr. Vargas:    Thank you for referring Tamara Omalley to me for evaluation. Below are my notes for this consultation.    If you have questions, please do not hesitate to call me. I look forward to following your patient along with you.         Sincerely,        Tawana Nixon MD        CC: MD Hussain Jackson Ravichandra, MD  2022  1:45 PM  Signed      [facility]  [facility]  [address], [address]  [place] [address]  Phone: [phone]  Fax: [phone]      22    Re: Tamara Omalley  : 1950    Chief Complaint: hospital follow up    History of Present Illness:   Tamara Omalley is a 72 y.o. female who presents in neurosurgical consultation. She has a past medical history of breast carcinoma, proctalgia fugax, hyperlipidemia, hypertension, osteopenia and anal fissure who presented to [facility] ED on 22 with left lower extremity pain that started the same day. She reported left inferior knee and shin pain as well as left sided lower back pain that radiates down her lateral left leg to just below her knee. X-ray of the lumbar spine and left hip with degenerative joint disease seen but no acute fractures or abnormalities. MRI of the lumbar spine demonstrated multilevel lumbar degenerative changes and small left foraminal disc herniation at L3-L4. Patient's biggest concern of her inferior knee/anterior shin pain was not thought to align with the rest of her radicular pain. However her left radicular pain was thought likely due to her L3 compression. Conservative treatment was recommended in the form of injections, medications, and bedrest.    Since last being seen, her symptoms have somewhat improved but she does have persistent left leg  discomfort. After ambulating 1-2 minutes, she continues with symptoms in her left lateral calf, inferior knee and shin. Rest alleviates these symptoms. She has a small area of numbness in her left inferolateral knee. The previous symptom of left sided lower back pain that radiates down her lateral left leg to just below her knee has improved. She has undergone 2 LESI with Dr. Estrada. The first injection provided about 5 days of improvement in her symptoms, the second provided no relief. She continues on gabapentin, motrin which provide her some improvement in symptoms. She is ambulating with the assistance of a cane. Although her walking ability has improved, she notes she is far from her baseline. She denies right leg pain, lower extremity weakness, bowel or bladder dysfunction.    She completed an MRI of her left knee recently which reveals mild patellofemoral chondrosis, no other structural abnormalities.     Medical History:  has a past medical history of Breast cancer (CMS/[facility]) and Hypertension.    Surgical History:  has no past surgical history on file.    Family History: family history includes Breast cancer in her biological mother; Osteopenia in her biological father.  Family history non-contributory to neurological condition.    Social History:   Social History     Socioeconomic History    Marital status:      Spouse name: None    Number of children: None    Years of education: None    Highest education level: None   Tobacco Use    Smoking status: Never Smoker    Smokeless tobacco: Never Used   Substance and Sexual Activity    Alcohol use: Not Currently    Drug use: Never    Sexual activity: Defer     Social Determinants of Health     Food Insecurity: No Food Insecurity    Worried About Running Out of Food in the Last Year: Never true    Ran Out of Food in the Last Year: Never true        Allergies:   Allergies   Allergen Reactions    Anastrozole      Other reaction(s): Arthralgias,  Myalgias  Pain in finger joints  Pain in finger joints      Iodine And Iodide Containing Products Rash     Topical Iodine  Topical Iodine         Medications:   Current Outpatient Medications   Medication Sig Dispense Refill    cyclobenzaprine (FLEXERIL) 5 mg tablet Take 1 tablet (5 mg total) by mouth 3 (three) times a day as needed for muscle spasms. 90 tablet 0    aspirin 81 mg enteric coated tablet Takes every other day.      cholecalciferol, vitamin D3, 400 unit (10 mcg) tablet tablet Take 1 tablet by mouth See admin instr.      gabapentin (NEURONTIN) 100 mg capsule TAKE 1 CAPSULE BY MOUTH 4 TIMES A DAY      gabapentin (NEURONTIN) 300 mg capsule TAKE 1 CAPSULE BY MOUTH 4 TIMES A DAY      ibuprofen (MOTRIN) 600 mg tablet Take 1 tablet (600 mg total) by mouth 4 (four) times a day as needed for mild pain (pain) for up to 4 days. 90 tablet 0    lidocaine (ASPERCREME) 4 % adhesive patch,medicated topical patch Apply 1 patch topically daily. Apply to lumbar back 30 patch 0    metoprolol tartrate (LOPRESSOR) 25 mg tablet Take 12.5 mg by mouth.      polyethylene glycol (MIRALAX) 17 gram/dose powder Take 17 g by mouth.      rosuvastatin (CRESTOR) 10 mg tablet Take 10 mg by mouth once daily.      triamterene-hydrochlorothiazide (MAXZIDE-25) 37.5-25 mg per tablet Take 0.5 tablets by mouth once daily.      valACYclovir (VALTREX) 1 gram tablet TAKE TWO TABLETS BY MOUTH TWICE A DAY AS NEEDED      zolpidem (AMBIEN) 10 mg tablet TAKE 1 TABLET BY MOUTH DAILY AT BEDTIME AS NEEDED FOR SLEEP.       No current facility-administered medications for this visit.       Review of Systems: A 14 point review of systems was performed and aside from what is mentioned above is otherwise negative.    General physical examination:  The patient is well appearing female, sitting upright in her chair in no acute distress, she appears her stated age.  Her head is atraumatic, normocephalic. Her neck is supple without obvious  "adenopathy. Oral mucosa is moist. Her peripheral pulses are symmetric and palpable. Extremities without peripheral edema. Her breathing is normal and unlabored.     Vitals:    09/19/22 1527   BP: 136/80   BP Location: Right upper arm   Patient Position: Sitting   Pulse: 82   SpO2: 96%   Weight: 58.1 kg (128 lb)   Height: 1.575 m (5' 2\")        Neurologic examination:  Mental status:  The patient is alert, attentive, and oriented. Speech is clear and fluent with good repetition, comprehension, and naming.  Remote and recent memory are normal as well as fund of knowledge.    Cranial nerves:  CN II: Visual fields are full to confrontation.  Pupils are equal and briskly reactive to light.   CN III, IV, VI: Extra-occular muscles are intact  CN V: Facial sensation is intact and equal in V1-V3 distributions bilaterally.   CN VII: Face is symmetric with normal eye closure and smile.  CN VIII: Hearing is normal to rubbing fingers  CN IX, X: Palate elevates symmetrically. Phonation is normal.  CN XI: Head turning and shoulder shrug are intact  CN XII: Tongue is midline with normal movements and no atrophy.    Motor:  There is no pronator drift.  Muscle bulk and tone are normal.    Deltoid Biceps Triceps Wrist ext Hand  Finger Spread Hip flexion Knee ext Dorsi-  flexion EHL Plantar Flexion   L 5 5 5 5 5 5 4+ PL 5 5 5 5   R 5 5 5 5 5 5 5 5 5 5 5     Reflexes:  Reflexes are 2+ and symmetric at the biceps, brachioradialis, triceps, patellar, and achilles. There is no Wright's sign or ankle clonus.    Sensory:   Intact to light touch throughout all 4 extremities with exception of area of numbness in her inferolateral left knee.    Coordination:  There is no dysmetria on finger-to-nose testing.  Romberg is absent.    Gait:  Gait is steady with normal steps.     Data Review:  MRI lumbar spine with and without contrast, 8/1/22  CLINICAL HISTORY:  Myelopathy, acute, lumbar spine worsening low back pain.  Patient presents with " back and left leg pain.     COMMENT:  Comparison: Lumbar spine radiograph dated 7/31/2022..  Technique: Multiplanar MR imaging of the lumbar spine was performed without  intravenous contrast.     FINDINGS:     Normal lumbar lordosis is maintained. Vertebral body heights are maintained. No  subluxation. Degenerative endplate fatty marrow changes are noted in the lumbar  spine, sacrum, and iliac bones. There is diffuse intervertebral disc desiccation  with disc space narrowing most prominent at L5-S1. No prevertebral or  paravertebral soft tissue edema. Small hemangioma is noted along the superior  endplate of the L3 vertebra. Endplate degenerative changes are most prominent at  L5-S1. Lumbar spinal canal is patent. Scattered Schmorl's nodes are noted.     The conus terminates at the L1 level. The distal cord is normal in size and  signal characteristics. No abnormal vertebral body or intrathecal enhancement is  identified. However, there is enhancement noted in the left L3-L4 neural  foramina likely associated with the acutely herniated disc.     Level by level assessment:     T12-L1: No disc herniation. No central canal or foraminal narrowing.     L1-L2: Broad-based disc bulge with left foraminal extension. Mild facet  hypertrophy. Mild left foraminal narrowing. No central canal or right foraminal  narrowing.     L2-L3: Broad-based disc bulge with left foraminal extension. Facet hypertrophy  and ligamentum flavum infolding.  Fluid in the right greater than left facet  joints. Mild bilateral foraminal narrowing.  No central canal narrowing.     L3-L4: Broad-based disc bulge with a left foraminal disc protrusion/extrusion  with  facet hypertrophy and ligamentum flavum infolding.  Moderate right and  moderate to severe left foraminal narrowing.  Left foraminal disc  protrusion/extrusion contacts the exiting nerve root within the L3-L4 neural  foramina. No central canal narrowing.     L4-L5: Broad-based disc bulge with  left greater than right foraminal extension.  Central annular fissure is noted. Mild facet hypertrophy and ligamentum flavum  infolding.  Mild right and  Moderate left foraminal narrowing.  Mild central  canal narrowing.     L5-S1: Broad-based disc osteophyte complex  with facet hypertrophy and  ligamentum flavum infolding.  Mild right and  Moderate left foraminal narrowing.  Mild central canal narrowing.     Extra vertebral soft tissues: Within normal limits..     --  IMPRESSION:  1.  Multilevel lumbar degenerative changes without severe central canal  narrowing.  2.  Left foraminal disc herniation with focal protrusion/extrusion and mild  enhancement is noted contacting the exiting nerve root in the left L3-L4 neural  Foramina.    X-ray lumbar spine, 7/31/22  CLINICAL HISTORY: back pain     COMMENT:  3 view lumbar spine. No direct comparison. Mild straightening of the  normal lordotic curvature. Alignment otherwise appears anatomic. Vertebral body  heights appear preserved. No acute fracture seen. Multilevel degenerative disc  change most severe at L5-S1 with disc height loss, endplate sclerosis and  marginal osteophyte formation. Lower lumbar facet arthrosis most marked at  L5-S1. Bilateral SI degenerative joint disease. Increased colonic stool burden.     --  IMPRESSION: No acute fractures seen. Multilevel degenerative disc changes most  severe at L5-S1.    Images were personally reviewed by myself and with the patient.    Assessment and Plan:  In summary, Tamara Omalley is a 72 y.o. female who presented to [facility] ED on 7/31/22 with left leg pain. MRI revealed L3-L4 far lateral disc herniation on the left side. She was recommended conservative treatment with medications, injections. She underwent 2 LESI, the first provided 5 days of relief and the second was not beneficial. Since hospitalization, her symptoms have improved but she continues with persistent pain in her left lateral/anterior calf and shin with  ambulation.     At this time, it is recommended she continue conservative treatment as her symptoms have somewhat improved over the last 6 weeks. She will continue taking gabapentin, motrin, and was prescribed flexeril for additional relief. She is considering a third injection and will discuss with Dr. Estrada if she would like to proceed. I asked she follow up in 4-6 weeks for reevaluation. In the interim should her symptomatology evolve or worsen, I have asked she return sooner for prompt reevaluation.    Thank you for referring Tamara Omalley to my attention. Please feel free to contact me anytime if I can be of further assistance.    I, Oscar Verde PA-C, am scribing for, and in the presence of, Ruben Nixon MD.    I, Ruben Nixon MD, personally performed the services described in this documentation as scribed by Oscar Verde PA-C in my presence, and it is accurate and complete.     I spent 30 minutes on this date of service performing the following activities: obtaining history, performing examination, entering orders, documenting, obtaining / reviewing records, providing counseling and education, independently reviewing study/studies and communicating results.

## 2022-09-19 NOTE — PROGRESS NOTES
[facility]  [facility]  [address], [address]  [place] [address]  Phone: [phone]  Fax: [phone]      22    Re: Tamara Omalley  : 1950    Chief Complaint: hospital follow up    History of Present Illness:   Tamara Omalley is a 72 y.o. female who presents in neurosurgical consultation. She has a past medical history of breast carcinoma, proctalgia fugax, hyperlipidemia, hypertension, osteopenia and anal fissure who presented to [facility] ED on 22 with left lower extremity pain that started the same day. She reported left inferior knee and shin pain as well as left sided lower back pain that radiates down her lateral left leg to just below her knee. X-ray of the lumbar spine and left hip with degenerative joint disease seen but no acute fractures or abnormalities. MRI of the lumbar spine demonstrated multilevel lumbar degenerative changes and small left foraminal disc herniation at L3-L4. Patient's biggest concern of her inferior knee/anterior shin pain was not thought to align with the rest of her radicular pain. However her left radicular pain was thought likely due to her L3 compression. Conservative treatment was recommended in the form of injections, medications, and bedrest.    Since last being seen, her symptoms have somewhat improved but she does have persistent left leg discomfort. After ambulating 1-2 minutes, she continues with symptoms in her left lateral calf, inferior knee and shin. Rest alleviates these symptoms. She has a small area of numbness in her left inferolateral knee. The previous symptom of left sided lower back pain that radiates down her lateral left leg to just below her knee has improved. She has undergone 2 LESI with Dr. Estrada. The first injection provided about 5 days of improvement in her symptoms, the second provided no relief. She continues on gabapentin, motrin which provide her some improvement in  symptoms. She is ambulating with the assistance of a cane. Although her walking ability has improved, she notes she is far from her baseline. She denies right leg pain, lower extremity weakness, bowel or bladder dysfunction.    She completed an MRI of her left knee recently which reveals mild patellofemoral chondrosis, no other structural abnormalities.     Medical History:  has a past medical history of Breast cancer (CMS/HCC) and Hypertension.    Surgical History:  has no past surgical history on file.    Family History: family history includes Breast cancer in her biological mother; Osteopenia in her biological father.  Family history non-contributory to neurological condition.    Social History:   Social History     Socioeconomic History    Marital status:      Spouse name: None    Number of children: None    Years of education: None    Highest education level: None   Tobacco Use    Smoking status: Never Smoker    Smokeless tobacco: Never Used   Substance and Sexual Activity    Alcohol use: Not Currently    Drug use: Never    Sexual activity: Defer     Social Determinants of Health     Food Insecurity: No Food Insecurity    Worried About Running Out of Food in the Last Year: Never true    Ran Out of Food in the Last Year: Never true        Allergies:   Allergies   Allergen Reactions    Anastrozole      Other reaction(s): Arthralgias, Myalgias  Pain in finger joints  Pain in finger joints      Iodine And Iodide Containing Products Rash     Topical Iodine  Topical Iodine         Medications:   Current Outpatient Medications   Medication Sig Dispense Refill    cyclobenzaprine (FLEXERIL) 5 mg tablet Take 1 tablet (5 mg total) by mouth 3 (three) times a day as needed for muscle spasms. 90 tablet 0    aspirin 81 mg enteric coated tablet Takes every other day.      cholecalciferol, vitamin D3, 400 unit (10 mcg) tablet tablet Take 1 tablet by mouth See admin instr.      gabapentin (NEURONTIN) 100  "mg capsule TAKE 1 CAPSULE BY MOUTH 4 TIMES A DAY      gabapentin (NEURONTIN) 300 mg capsule TAKE 1 CAPSULE BY MOUTH 4 TIMES A DAY      ibuprofen (MOTRIN) 600 mg tablet Take 1 tablet (600 mg total) by mouth 4 (four) times a day as needed for mild pain (pain) for up to 4 days. 90 tablet 0    lidocaine (ASPERCREME) 4 % adhesive patch,medicated topical patch Apply 1 patch topically daily. Apply to lumbar back 30 patch 0    metoprolol tartrate (LOPRESSOR) 25 mg tablet Take 12.5 mg by mouth.      polyethylene glycol (MIRALAX) 17 gram/dose powder Take 17 g by mouth.      rosuvastatin (CRESTOR) 10 mg tablet Take 10 mg by mouth once daily.      triamterene-hydrochlorothiazide (MAXZIDE-25) 37.5-25 mg per tablet Take 0.5 tablets by mouth once daily.      valACYclovir (VALTREX) 1 gram tablet TAKE TWO TABLETS BY MOUTH TWICE A DAY AS NEEDED      zolpidem (AMBIEN) 10 mg tablet TAKE 1 TABLET BY MOUTH DAILY AT BEDTIME AS NEEDED FOR SLEEP.       No current facility-administered medications for this visit.       Review of Systems: A 14 point review of systems was performed and aside from what is mentioned above is otherwise negative.    General physical examination:  The patient is well appearing female, sitting upright in her chair in no acute distress, she appears her stated age.  Her head is atraumatic, normocephalic. Her neck is supple without obvious adenopathy. Oral mucosa is moist. Her peripheral pulses are symmetric and palpable. Extremities without peripheral edema. Her breathing is normal and unlabored.     Vitals:    09/19/22 1527   BP: 136/80   BP Location: Right upper arm   Patient Position: Sitting   Pulse: 82   SpO2: 96%   Weight: 58.1 kg (128 lb)   Height: 1.575 m (5' 2\")        Neurologic examination:  Mental status:  The patient is alert, attentive, and oriented. Speech is clear and fluent with good repetition, comprehension, and naming.  Remote and recent memory are normal as well as fund of " knowledge.    Cranial nerves:  CN II: Visual fields are full to confrontation.  Pupils are equal and briskly reactive to light.   CN III, IV, VI: Extra-occular muscles are intact  CN V: Facial sensation is intact and equal in V1-V3 distributions bilaterally.   CN VII: Face is symmetric with normal eye closure and smile.  CN VIII: Hearing is normal to rubbing fingers  CN IX, X: Palate elevates symmetrically. Phonation is normal.  CN XI: Head turning and shoulder shrug are intact  CN XII: Tongue is midline with normal movements and no atrophy.    Motor:  There is no pronator drift.  Muscle bulk and tone are normal.    Deltoid Biceps Triceps Wrist ext Hand  Finger Spread Hip flexion Knee ext Dorsi-  flexion EHL Plantar Flexion   L 5 5 5 5 5 5 4+ PL 5 5 5 5   R 5 5 5 5 5 5 5 5 5 5 5     Reflexes:  Reflexes are 2+ and symmetric at the biceps, brachioradialis, triceps, patellar, and achilles. There is no Wright's sign or ankle clonus.    Sensory:   Intact to light touch throughout all 4 extremities with exception of area of numbness in her inferolateral left knee.    Coordination:  There is no dysmetria on finger-to-nose testing.  Romberg is absent.    Gait:  Gait is steady with normal steps.     Data Review:  MRI lumbar spine with and without contrast, 8/1/22  CLINICAL HISTORY:  Myelopathy, acute, lumbar spine worsening low back pain.  Patient presents with back and left leg pain.     COMMENT:  Comparison: Lumbar spine radiograph dated 7/31/2022..  Technique: Multiplanar MR imaging of the lumbar spine was performed without  intravenous contrast.     FINDINGS:     Normal lumbar lordosis is maintained. Vertebral body heights are maintained. No  subluxation. Degenerative endplate fatty marrow changes are noted in the lumbar  spine, sacrum, and iliac bones. There is diffuse intervertebral disc desiccation  with disc space narrowing most prominent at L5-S1. No prevertebral or  paravertebral soft tissue edema. Small  hemangioma is noted along the superior  endplate of the L3 vertebra. Endplate degenerative changes are most prominent at  L5-S1. Lumbar spinal canal is patent. Scattered Schmorl's nodes are noted.     The conus terminates at the L1 level. The distal cord is normal in size and  signal characteristics. No abnormal vertebral body or intrathecal enhancement is  identified. However, there is enhancement noted in the left L3-L4 neural  foramina likely associated with the acutely herniated disc.     Level by level assessment:     T12-L1: No disc herniation. No central canal or foraminal narrowing.     L1-L2: Broad-based disc bulge with left foraminal extension. Mild facet  hypertrophy. Mild left foraminal narrowing. No central canal or right foraminal  narrowing.     L2-L3: Broad-based disc bulge with left foraminal extension. Facet hypertrophy  and ligamentum flavum infolding.  Fluid in the right greater than left facet  joints. Mild bilateral foraminal narrowing.  No central canal narrowing.     L3-L4: Broad-based disc bulge with a left foraminal disc protrusion/extrusion  with  facet hypertrophy and ligamentum flavum infolding.  Moderate right and  moderate to severe left foraminal narrowing.  Left foraminal disc  protrusion/extrusion contacts the exiting nerve root within the L3-L4 neural  foramina. No central canal narrowing.     L4-L5: Broad-based disc bulge with left greater than right foraminal extension.  Central annular fissure is noted. Mild facet hypertrophy and ligamentum flavum  infolding.  Mild right and  Moderate left foraminal narrowing.  Mild central  canal narrowing.     L5-S1: Broad-based disc osteophyte complex  with facet hypertrophy and  ligamentum flavum infolding.  Mild right and  Moderate left foraminal narrowing.  Mild central canal narrowing.     Extra vertebral soft tissues: Within normal limits..     --  IMPRESSION:  1.  Multilevel lumbar degenerative changes without severe central  canal  narrowing.  2.  Left foraminal disc herniation with focal protrusion/extrusion and mild  enhancement is noted contacting the exiting nerve root in the left L3-L4 neural  Foramina.    X-ray lumbar spine, 7/31/22  CLINICAL HISTORY: back pain     COMMENT:  3 view lumbar spine. No direct comparison. Mild straightening of the  normal lordotic curvature. Alignment otherwise appears anatomic. Vertebral body  heights appear preserved. No acute fracture seen. Multilevel degenerative disc  change most severe at L5-S1 with disc height loss, endplate sclerosis and  marginal osteophyte formation. Lower lumbar facet arthrosis most marked at  L5-S1. Bilateral SI degenerative joint disease. Increased colonic stool burden.     --  IMPRESSION: No acute fractures seen. Multilevel degenerative disc changes most  severe at L5-S1.    Images were personally reviewed by myself and with the patient.    Assessment and Plan:  In summary, Tamara Omalley is a 72 y.o. female who presented to [facility] ED on 7/31/22 with left leg pain. MRI revealed L3-L4 far lateral disc herniation on the left side. She was recommended conservative treatment with medications, injections. She underwent 2 LESI, the first provided 5 days of relief and the second was not beneficial. Since hospitalization, her symptoms have improved but she continues with persistent pain in her left lateral/anterior calf and shin with ambulation.     At this time, it is recommended she continue conservative treatment as her symptoms have somewhat improved over the last 6 weeks. She will continue taking gabapentin, motrin, and was prescribed flexeril for additional relief. She is considering a third injection and will discuss with Dr. Estrada if she would like to proceed. I asked she follow up in 4-6 weeks for reevaluation. In the interim should her symptomatology evolve or worsen, I have asked she return sooner for prompt reevaluation.    Thank you for referring Tamara Omalley to my  attention. Please feel free to contact me anytime if I can be of further assistance.    I, Oscar Verde PA-C, am scribing for, and in the presence of, Ruben Nixon MD.    I, Ruben Nixon MD, personally performed the services described in this documentation as scribed by Oscar Verde PA-C in my presence, and it is accurate and complete.     I spent 30 minutes on this date of service performing the following activities: obtaining history, performing examination, entering orders, documenting, obtaining / reviewing records, providing counseling and education, independently reviewing study/studies and communicating results.

## 2022-10-27 ENCOUNTER — OFFICE VISIT (OUTPATIENT)
Dept: NEUROSURGERY | Facility: CLINIC | Age: 72
End: 2022-10-27
Payer: COMMERCIAL

## 2022-10-27 VITALS
HEIGHT: 62 IN | DIASTOLIC BLOOD PRESSURE: 80 MMHG | OXYGEN SATURATION: 98 % | BODY MASS INDEX: 23.41 KG/M2 | SYSTOLIC BLOOD PRESSURE: 120 MMHG | HEART RATE: 70 BPM

## 2022-10-27 DIAGNOSIS — M54.32 LEFT SIDED SCIATICA: Primary | ICD-10-CM

## 2022-10-27 DIAGNOSIS — M54.16 LUMBAR RADICULOPATHY: ICD-10-CM

## 2022-10-27 PROCEDURE — 3008F BODY MASS INDEX DOCD: CPT | Performed by: NEUROLOGICAL SURGERY

## 2022-10-27 PROCEDURE — 99213 OFFICE O/P EST LOW 20 MIN: CPT | Performed by: NEUROLOGICAL SURGERY

## 2022-10-27 ASSESSMENT — PAIN SCALES - GENERAL: PAINLEVEL: 1

## 2022-10-27 NOTE — LETTER
2022     Tova Vargas MD  [address]  [address]  [facility] [address]    Patient: Tamara Omalley  YOB: 1950  Date of Visit: 10/27/2022      Dear Dr. Vargas:    Thank you for referring Tamara Omalley to me for evaluation. Below are my notes for this consultation.    If you have questions, please do not hesitate to call me. I look forward to following your patient along with you.         Sincerely,        Tawana Nixon MD        CC: MD Hussain Jackson Ravichandra, MD  10/31/2022 10:25 PM  Signed      [facility]  [facility]  Medical Office [address], [address]  [place] [address]  Phone: [phone]  Fax: [phone]      10/27/22    Re: Tamara Omalley  : 1950    Chief Complaint: hospital follow up    History of Present Illness:   Tamara Omalley is a 72 y.o. female who presents in neurosurgical consultation. She has a past medical history of breast carcinoma, proctalgia fugax, hyperlipidemia, hypertension, osteopenia and anal fissure who presented to [facility] ED on 22 with left lower extremity pain that started the same day. She reported left inferior knee and shin pain as well as left sided lower back pain that radiates down her lateral left leg to just below her knee. X-ray of the lumbar spine and left hip with degenerative joint disease seen but no acute fractures or abnormalities. MRI of the lumbar spine demonstrated multilevel lumbar degenerative changes and small left foraminal disc herniation at L3-L4. Patient's biggest concern of her inferior knee/anterior shin pain was not thought to align with the rest of her radicular pain. However her left radicular pain was thought likely due to her L3 compression. Conservative treatment was recommended in the form of injections, medications, and bedrest.    Since last being seen, the patient states she after walking that her left leg is weaker after walking  "and she requires a cane.  She describes a \"holding\" like pain into her left knee, thigh, and occasionally into her shin.  Overall she feels her pain is improved, but she is concerned about her weakness.  She denies right leg pain, lower extremity weakness, bowel or bladder dysfunction.    She completed an MRI of her left knee recently which reveals mild patellofemoral chondrosis, no other structural abnormalities.     Medical History:  has a past medical history of Breast cancer (CMS/HCC) and Hypertension.    Surgical History:  has no past surgical history on file.    Family History: family history includes Breast cancer in her biological mother; Osteopenia in her biological father.  Family history non-contributory to neurological condition.    Social History:   Social History     Socioeconomic History    Marital status:      Spouse name: None    Number of children: None    Years of education: None    Highest education level: None   Tobacco Use    Smoking status: Never    Smokeless tobacco: Never   Substance and Sexual Activity    Alcohol use: Not Currently    Drug use: Never    Sexual activity: Defer     Social Determinants of Health     Food Insecurity: No Food Insecurity    Worried About Running Out of Food in the Last Year: Never true    Ran Out of Food in the Last Year: Never true        Allergies:   Allergies   Allergen Reactions    Anastrozole      Other reaction(s): Arthralgias, Myalgias  Pain in finger joints  Pain in finger joints      Iodine And Iodide Containing Products Rash     Topical Iodine  Topical Iodine         Medications:   Current Outpatient Medications   Medication Sig Dispense Refill    aspirin 81 mg enteric coated tablet Takes every other day.      cholecalciferol, vitamin D3, 400 unit (10 mcg) tablet tablet Take 1 tablet by mouth See admin instr.      cyclobenzaprine (FLEXERIL) 5 mg tablet Take 1 tablet (5 mg total) by mouth 3 (three) times a day as needed for muscle " "spasms. 90 tablet 0    gabapentin (NEURONTIN) 100 mg capsule TAKE 1 CAPSULE BY MOUTH 4 TIMES A DAY      gabapentin (NEURONTIN) 300 mg capsule TAKE 1 CAPSULE BY MOUTH 4 TIMES A DAY      ibuprofen (MOTRIN) 600 mg tablet Take 1 tablet (600 mg total) by mouth 4 (four) times a day as needed for mild pain (pain) for up to 4 days. 90 tablet 0    metoprolol tartrate (LOPRESSOR) 25 mg tablet Take 12.5 mg by mouth.      polyethylene glycol (MIRALAX) 17 gram/dose powder Take 17 g by mouth.      rosuvastatin (CRESTOR) 10 mg tablet Take 10 mg by mouth once daily.      triamterene-hydrochlorothiazide (MAXZIDE-25) 37.5-25 mg per tablet Take 0.5 tablets by mouth once daily.      valACYclovir (VALTREX) 1 gram tablet TAKE TWO TABLETS BY MOUTH TWICE A DAY AS NEEDED      zolpidem (AMBIEN) 10 mg tablet TAKE 1 TABLET BY MOUTH DAILY AT BEDTIME AS NEEDED FOR SLEEP.       No current facility-administered medications for this visit.       Review of Systems: A 14 point review of systems was performed and aside from what is mentioned above is otherwise negative.    General physical examination:  The patient is well appearing female, sitting upright in her chair in no acute distress, she appears her stated age.  Her head is atraumatic, normocephalic. Her neck is supple without obvious adenopathy. Oral mucosa is moist. Her peripheral pulses are symmetric and palpable. Extremities without peripheral edema. Her breathing is normal and unlabored.     Vitals:    10/27/22 1023   BP: 120/80   BP Location: Left upper arm   Patient Position: Sitting   Pulse: 70   SpO2: 98%   Height: 1.575 m (5' 2.01\")        Neurologic examination:  Mental status:  The patient is alert, attentive, and oriented. Speech is clear and fluent with good repetition, comprehension, and naming.  Remote and recent memory are normal as well as fund of knowledge.    Cranial nerves:  CN II: Visual fields are full to confrontation.  Pupils are equal and briskly reactive to " light.   CN III, IV, VI: Extra-occular muscles are intact  CN V: Facial sensation is intact and equal in V1-V3 distributions bilaterally.   CN VII: Face is symmetric with normal eye closure and smile.  CN VIII: Hearing is normal to rubbing fingers  CN IX, X: Palate elevates symmetrically. Phonation is normal.  CN XI: Head turning and shoulder shrug are intact  CN XII: Tongue is midline with normal movements and no atrophy.    Motor:  There is no pronator drift.  Muscle bulk and tone are normal.    Deltoid Biceps Triceps Wrist ext Hand  Finger Spread Hip flexion Knee ext Dorsi-  flexion EHL Plantar Flexion   L 5 5 5 5 5 5 4+ PL 5 5 5 5   R 5 5 5 5 5 5 5 5 5 5 5     Reflexes:  Reflexes are 2+ and symmetric at the biceps, brachioradialis, triceps, patellar, and achilles. There is no Wright's sign or ankle clonus.    Sensory:   Intact to light touch throughout all 4 extremities with exception of area of numbness in her inferolateral left knee.    Coordination:  There is no dysmetria on finger-to-nose testing.  Romberg is absent.    Gait:  Gait is steady with normal steps.     Data Review:  MRI lumbar spine with and without contrast, 8/1/22  CLINICAL HISTORY:  Myelopathy, acute, lumbar spine worsening low back pain.  Patient presents with back and left leg pain.     COMMENT:  Comparison: Lumbar spine radiograph dated 7/31/2022..  Technique: Multiplanar MR imaging of the lumbar spine was performed without  intravenous contrast.     FINDINGS:     Normal lumbar lordosis is maintained. Vertebral body heights are maintained. No  subluxation. Degenerative endplate fatty marrow changes are noted in the lumbar  spine, sacrum, and iliac bones. There is diffuse intervertebral disc desiccation  with disc space narrowing most prominent at L5-S1. No prevertebral or  paravertebral soft tissue edema. Small hemangioma is noted along the superior  endplate of the L3 vertebra. Endplate degenerative changes are most prominent  at  L5-S1. Lumbar spinal canal is patent. Scattered Schmorl's nodes are noted.     The conus terminates at the L1 level. The distal cord is normal in size and  signal characteristics. No abnormal vertebral body or intrathecal enhancement is  identified. However, there is enhancement noted in the left L3-L4 neural  foramina likely associated with the acutely herniated disc.     Level by level assessment:     T12-L1: No disc herniation. No central canal or foraminal narrowing.     L1-L2: Broad-based disc bulge with left foraminal extension. Mild facet  hypertrophy. Mild left foraminal narrowing. No central canal or right foraminal  narrowing.     L2-L3: Broad-based disc bulge with left foraminal extension. Facet hypertrophy  and ligamentum flavum infolding.  Fluid in the right greater than left facet  joints. Mild bilateral foraminal narrowing.  No central canal narrowing.     L3-L4: Broad-based disc bulge with a left foraminal disc protrusion/extrusion  with  facet hypertrophy and ligamentum flavum infolding.  Moderate right and  moderate to severe left foraminal narrowing.  Left foraminal disc  protrusion/extrusion contacts the exiting nerve root within the L3-L4 neural  foramina. No central canal narrowing.     L4-L5: Broad-based disc bulge with left greater than right foraminal extension.  Central annular fissure is noted. Mild facet hypertrophy and ligamentum flavum  infolding.  Mild right and  Moderate left foraminal narrowing.  Mild central  canal narrowing.     L5-S1: Broad-based disc osteophyte complex  with facet hypertrophy and  ligamentum flavum infolding.  Mild right and  Moderate left foraminal narrowing.  Mild central canal narrowing.     Extra vertebral soft tissues: Within normal limits..     --  IMPRESSION:  1.  Multilevel lumbar degenerative changes without severe central canal  narrowing.  2.  Left foraminal disc herniation with focal protrusion/extrusion and mild  enhancement is noted contacting  the exiting nerve root in the left L3-L4 neural  Foramina.    X-ray lumbar spine, 7/31/22  CLINICAL HISTORY: back pain     COMMENT:  3 view lumbar spine. No direct comparison. Mild straightening of the  normal lordotic curvature. Alignment otherwise appears anatomic. Vertebral body  heights appear preserved. No acute fracture seen. Multilevel degenerative disc  change most severe at L5-S1 with disc height loss, endplate sclerosis and  marginal osteophyte formation. Lower lumbar facet arthrosis most marked at  L5-S1. Bilateral SI degenerative joint disease. Increased colonic stool burden.     --  IMPRESSION: No acute fractures seen. Multilevel degenerative disc changes most  severe at L5-S1.    Images were personally reviewed by myself and with the patient.    Assessment and Plan:  In summary, Tamara Omalley is a 72 y.o. female who presented to [facility] ED on 7/31/22 with left leg pain. MRI revealed L3-L4 far lateral disc herniation on the left side. She was recommended conservative treatment with medications, injections. The patient's pain continues to improve slowly.  We discussed the natural history of lumbar radiculopathy secondary to herniated disc.  At this point, I recommend she start physical therapy.  Should the patient not continue her path of improvement, she should undergo repeat MR imaging.  I will see the patient back in 6 weeks to assess her progress.  Should any questions or issues arise in the meantime she will contact my office.  The patient expressed understanding, agrees with the overall plan and will follow up as stated above.  Thank you for giving us the opportunity to care for Tamara Omalley      CAM, [place], am scribing for, and in the presence of, Dr. Hussain MD.    I, Dr. Ruben Nixon MD personally performed the services described in this documentation as scribed by Donald Loznao PA-C in my presence, and it is accurate and complete.       I spent 20 minutes on this date  of service performing the following activities: obtaining history, performing examination, entering orders, documenting, obtaining / reviewing records, providing counseling and education, independently reviewing study/studies and communicating results.

## 2022-10-27 NOTE — PROGRESS NOTES
"    [facility]  [facility]  Medical Office Building [address], [address]  [place] [address]  Phone: [phone]  Fax: [phone]      10/27/22    Re: Tamara Omalley  : 1950    Chief Complaint: hospital follow up    History of Present Illness:   Tamara Omalley is a 72 y.o. female who presents in neurosurgical consultation. She has a past medical history of breast carcinoma, proctalgia fugax, hyperlipidemia, hypertension, osteopenia and anal fissure who presented to [facility] ED on 22 with left lower extremity pain that started the same day. She reported left inferior knee and shin pain as well as left sided lower back pain that radiates down her lateral left leg to just below her knee. X-ray of the lumbar spine and left hip with degenerative joint disease seen but no acute fractures or abnormalities. MRI of the lumbar spine demonstrated multilevel lumbar degenerative changes and small left foraminal disc herniation at L3-L4. Patient's biggest concern of her inferior knee/anterior shin pain was not thought to align with the rest of her radicular pain. However her left radicular pain was thought likely due to her L3 compression. Conservative treatment was recommended in the form of injections, medications, and bedrest.    Since last being seen, the patient states she after walking that her left leg is weaker after walking and she requires a cane.  She describes a \"holding\" like pain into her left knee, thigh, and occasionally into her shin.  Overall she feels her pain is improved, but she is concerned about her weakness.  She denies right leg pain, lower extremity weakness, bowel or bladder dysfunction.    She completed an MRI of her left knee recently which reveals mild patellofemoral chondrosis, no other structural abnormalities.     Medical History:  has a past medical history of Breast cancer (CMS/HCC) and Hypertension.    Surgical History:  has no past surgical " history on file.    Family History: family history includes Breast cancer in her biological mother; Osteopenia in her biological father.  Family history non-contributory to neurological condition.    Social History:   Social History     Socioeconomic History    Marital status:      Spouse name: None    Number of children: None    Years of education: None    Highest education level: None   Tobacco Use    Smoking status: Never    Smokeless tobacco: Never   Substance and Sexual Activity    Alcohol use: Not Currently    Drug use: Never    Sexual activity: Defer     Social Determinants of Health     Food Insecurity: No Food Insecurity    Worried About Running Out of Food in the Last Year: Never true    Ran Out of Food in the Last Year: Never true        Allergies:   Allergies   Allergen Reactions    Anastrozole      Other reaction(s): Arthralgias, Myalgias  Pain in finger joints  Pain in finger joints      Iodine And Iodide Containing Products Rash     Topical Iodine  Topical Iodine         Medications:   Current Outpatient Medications   Medication Sig Dispense Refill    aspirin 81 mg enteric coated tablet Takes every other day.      cholecalciferol, vitamin D3, 400 unit (10 mcg) tablet tablet Take 1 tablet by mouth See admin instr.      cyclobenzaprine (FLEXERIL) 5 mg tablet Take 1 tablet (5 mg total) by mouth 3 (three) times a day as needed for muscle spasms. 90 tablet 0    gabapentin (NEURONTIN) 100 mg capsule TAKE 1 CAPSULE BY MOUTH 4 TIMES A DAY      gabapentin (NEURONTIN) 300 mg capsule TAKE 1 CAPSULE BY MOUTH 4 TIMES A DAY      ibuprofen (MOTRIN) 600 mg tablet Take 1 tablet (600 mg total) by mouth 4 (four) times a day as needed for mild pain (pain) for up to 4 days. 90 tablet 0    metoprolol tartrate (LOPRESSOR) 25 mg tablet Take 12.5 mg by mouth.      polyethylene glycol (MIRALAX) 17 gram/dose powder Take 17 g by mouth.      rosuvastatin (CRESTOR) 10 mg tablet Take 10 mg by mouth once  "daily.      triamterene-hydrochlorothiazide (MAXZIDE-25) 37.5-25 mg per tablet Take 0.5 tablets by mouth once daily.      valACYclovir (VALTREX) 1 gram tablet TAKE TWO TABLETS BY MOUTH TWICE A DAY AS NEEDED      zolpidem (AMBIEN) 10 mg tablet TAKE 1 TABLET BY MOUTH DAILY AT BEDTIME AS NEEDED FOR SLEEP.       No current facility-administered medications for this visit.       Review of Systems: A 14 point review of systems was performed and aside from what is mentioned above is otherwise negative.    General physical examination:  The patient is well appearing female, sitting upright in her chair in no acute distress, she appears her stated age.  Her head is atraumatic, normocephalic. Her neck is supple without obvious adenopathy. Oral mucosa is moist. Her peripheral pulses are symmetric and palpable. Extremities without peripheral edema. Her breathing is normal and unlabored.     Vitals:    10/27/22 1023   BP: 120/80   BP Location: Left upper arm   Patient Position: Sitting   Pulse: 70   SpO2: 98%   Height: 1.575 m (5' 2.01\")        Neurologic examination:  Mental status:  The patient is alert, attentive, and oriented. Speech is clear and fluent with good repetition, comprehension, and naming.  Remote and recent memory are normal as well as fund of knowledge.    Cranial nerves:  CN II: Visual fields are full to confrontation.  Pupils are equal and briskly reactive to light.   CN III, IV, VI: Extra-occular muscles are intact  CN V: Facial sensation is intact and equal in V1-V3 distributions bilaterally.   CN VII: Face is symmetric with normal eye closure and smile.  CN VIII: Hearing is normal to rubbing fingers  CN IX, X: Palate elevates symmetrically. Phonation is normal.  CN XI: Head turning and shoulder shrug are intact  CN XII: Tongue is midline with normal movements and no atrophy.    Motor:  There is no pronator drift.  Muscle bulk and tone are normal.    Deltoid Biceps Triceps Wrist ext Hand  Finger " Spread Hip flexion Knee ext Dorsi-  flexion EHL Plantar Flexion   L 5 5 5 5 5 5 4+ PL 5 5 5 5   R 5 5 5 5 5 5 5 5 5 5 5     Reflexes:  Reflexes are 2+ and symmetric at the biceps, brachioradialis, triceps, patellar, and achilles. There is no Wright's sign or ankle clonus.    Sensory:   Intact to light touch throughout all 4 extremities with exception of area of numbness in her inferolateral left knee.    Coordination:  There is no dysmetria on finger-to-nose testing.  Romberg is absent.    Gait:  Gait is steady with normal steps.     Data Review:  MRI lumbar spine with and without contrast, 8/1/22  CLINICAL HISTORY:  Myelopathy, acute, lumbar spine worsening low back pain.  Patient presents with back and left leg pain.     COMMENT:  Comparison: Lumbar spine radiograph dated 7/31/2022..  Technique: Multiplanar MR imaging of the lumbar spine was performed without  intravenous contrast.     FINDINGS:     Normal lumbar lordosis is maintained. Vertebral body heights are maintained. No  subluxation. Degenerative endplate fatty marrow changes are noted in the lumbar  spine, sacrum, and iliac bones. There is diffuse intervertebral disc desiccation  with disc space narrowing most prominent at L5-S1. No prevertebral or  paravertebral soft tissue edema. Small hemangioma is noted along the superior  endplate of the L3 vertebra. Endplate degenerative changes are most prominent at  L5-S1. Lumbar spinal canal is patent. Scattered Schmorl's nodes are noted.     The conus terminates at the L1 level. The distal cord is normal in size and  signal characteristics. No abnormal vertebral body or intrathecal enhancement is  identified. However, there is enhancement noted in the left L3-L4 neural  foramina likely associated with the acutely herniated disc.     Level by level assessment:     T12-L1: No disc herniation. No central canal or foraminal narrowing.     L1-L2: Broad-based disc bulge with left foraminal extension. Mild  facet  hypertrophy. Mild left foraminal narrowing. No central canal or right foraminal  narrowing.     L2-L3: Broad-based disc bulge with left foraminal extension. Facet hypertrophy  and ligamentum flavum infolding.  Fluid in the right greater than left facet  joints. Mild bilateral foraminal narrowing.  No central canal narrowing.     L3-L4: Broad-based disc bulge with a left foraminal disc protrusion/extrusion  with  facet hypertrophy and ligamentum flavum infolding.  Moderate right and  moderate to severe left foraminal narrowing.  Left foraminal disc  protrusion/extrusion contacts the exiting nerve root within the L3-L4 neural  foramina. No central canal narrowing.     L4-L5: Broad-based disc bulge with left greater than right foraminal extension.  Central annular fissure is noted. Mild facet hypertrophy and ligamentum flavum  infolding.  Mild right and  Moderate left foraminal narrowing.  Mild central  canal narrowing.     L5-S1: Broad-based disc osteophyte complex  with facet hypertrophy and  ligamentum flavum infolding.  Mild right and  Moderate left foraminal narrowing.  Mild central canal narrowing.     Extra vertebral soft tissues: Within normal limits..     --  IMPRESSION:  1.  Multilevel lumbar degenerative changes without severe central canal  narrowing.  2.  Left foraminal disc herniation with focal protrusion/extrusion and mild  enhancement is noted contacting the exiting nerve root in the left L3-L4 neural  Foramina.    X-ray lumbar spine, 7/31/22  CLINICAL HISTORY: back pain     COMMENT:  3 view lumbar spine. No direct comparison. Mild straightening of the  normal lordotic curvature. Alignment otherwise appears anatomic. Vertebral body  heights appear preserved. No acute fracture seen. Multilevel degenerative disc  change most severe at L5-S1 with disc height loss, endplate sclerosis and  marginal osteophyte formation. Lower lumbar facet arthrosis most marked at  L5-S1. Bilateral SI degenerative  joint disease. Increased colonic stool burden.     --  IMPRESSION: No acute fractures seen. Multilevel degenerative disc changes most  severe at L5-S1.    Images were personally reviewed by myself and with the patient.    Assessment and Plan:  In summary, Tamara Omalley is a 72 y.o. female who presented to [facility] ED on 7/31/22 with left leg pain. MRI revealed L3-L4 far lateral disc herniation on the left side. She was recommended conservative treatment with medications, injections. The patient's pain continues to improve slowly.  We discussed the natural history of lumbar radiculopathy secondary to herniated disc.  At this point, I recommend she start physical therapy.  Should the patient not continue her path of improvement, she should undergo repeat MR imaging.  I will see the patient back in 6 weeks to assess her progress.  Should any questions or issues arise in the meantime she will contact my office.  The patient expressed understanding, agrees with the overall plan and will follow up as stated above.  Thank you for giving us the opportunity to care for Tamara Omalley      Donald LEVY PA C, am scribing for, and in the presence of, Dr. Hussain MD.    I, Dr. Ruben Nixon MD personally performed the services described in this documentation as scribed by Donald Lozano PA-C in my presence, and it is accurate and complete.       I spent 20 minutes on this date of service performing the following activities: obtaining history, performing examination, entering orders, documenting, obtaining / reviewing records, providing counseling and education, independently reviewing study/studies and communicating results.

## 2022-12-21 ENCOUNTER — TRANSCRIBE ORDERS (OUTPATIENT)
Dept: SCHEDULING | Facility: REHABILITATION | Age: 72
End: 2022-12-21

## 2022-12-21 DIAGNOSIS — M51.26 LUMBAR DISC HERNIATION: Primary | ICD-10-CM

## 2022-12-27 ENCOUNTER — TELEPHONE (OUTPATIENT)
Dept: REGISTRATION | Facility: CLINIC | Age: 72
End: 2022-12-27
Payer: COMMERCIAL

## 2022-12-27 NOTE — TELEPHONE ENCOUNTER
offered earlier PT eval for today 12/27/22 @ 5p mona Bonilla. requested call back to [facility]. pls transfer if she calls back

## 2023-01-18 ENCOUNTER — HOSPITAL ENCOUNTER (OUTPATIENT)
Dept: PHYSICAL THERAPY | Age: 73
Setting detail: THERAPIES SERIES
Discharge: HOME | End: 2023-01-18
Attending: NEUROLOGICAL SURGERY
Payer: COMMERCIAL

## 2023-01-18 DIAGNOSIS — M51.26 LUMBAR DISC HERNIATION: ICD-10-CM

## 2023-01-18 PROCEDURE — 97162 PT EVAL MOD COMPLEX 30 MIN: CPT | Mod: GP

## 2023-01-18 PROCEDURE — 97530 THERAPEUTIC ACTIVITIES: CPT | Mod: GP

## 2023-01-18 NOTE — OP PT TREATMENT LOG
ORTHO PT FLOWSHEET    Exercises Current Session Time Completed (Y/N/G)   MODALITIES- HEAT/ICE (42719) TOTAL TIME FOR SESSION Not performed    Heat     Ice     MODALITIES - E-STIM (35868) TOTAL TIME FOR SESSION Not performed    E-stim/H-Wave     THER ACT (53423) TOTAL TIME FOR SESSION 8-22 Minutes    Assessment  TONY to be returned    Vitals/pain See note Y   HEP 1/18/23: hip flexor stretch, slouch correction, sit to stand Y   Education 1/18/23: results of exam, plan of care - patient verbalizes agreement and understanding Y   Body Mechanics/Work Training     GROUP (27410)  Not performed    THER EX (82052) TOTAL TIME FOR SESSION Not performed    CARDIOVASCULAR      Nu Step          STRENGTHENING     Bridge      [facility]      Side lying skater     Sit to stand with band     Seated march Palof press     Step up     Standing reverse chop          STRETCHING/ROM     Hip flexor stretch     Prone knee flexion     Standing lunge at step     Ball rolls on wall          NEURO RE-ED (58884) TOTAL TIME FOR SESSION Not performed    Slouch correction     Deep diaphragmatic breathing     Jessica step overs     Standing stool taps          GAIT TRAINING (74434) TOTAL TIME FOR SESSION Not performed    Gait training with long strides     Agility ladder walk     MANUAL (32511) TOTAL TIME FOR SESSION Not performed    Hip flexor manual release          Y = yes; N = no; G = group

## 2023-01-18 NOTE — Clinical Note
[address] [place] [id number]      Dear DR. Dutta,      Thank you for this referral. Please review the attached notes and plan of care for your approval.  Please contact our department with any questions.     Sincerely,     Duane Main, PT        Referring Provider: By co-signing this Plan of Care (POC) either electronically or physically you agree to the following:    I have reviewed the the Plan of Care established by the therapist within this document and certify that the services are skilled and medically necessary. I have reviewed the plan and recommend that these services continue to meet the goals stated in this document.       EXTERNAL PROVIDER FAXING BACK:    PHYSICIAN SIGNATURE: __________________________________     DATE: ___________________  TIME: _____________    IMPORTANT:  If returning this Plan of Care by fax, please fax back ONLY the signature page.   _________________________________________________________________________      [facility] Fax: [phone]        PT EVALUATION FOR OUTPATIENT THERAPY    Patient: Tamara Omalley  MRN: [id number]  : 1950 72 y.o.   Referring Physician: Von Dutta MD  Date of Visit: 2023      Certification Dates:  23 through 23         Recommended Frequency & Duration:  2 times/week for up to 8 weeks     Diagnosis:   1. Lumbar disc herniation        Chief Complaints:   Chief Complaint   Patient presents with   • Dec Strength   • Pain   • Difficulty Walking   • Decreased Mobility       Precautions: other (see comments)  Precautions additional comments: history of breast cancer, hypertension    Past Medical History:   Past Medical History:   Diagnosis Date   • Breast cancer (CMS/HCC)    • Hypertension        Past Surgical History: History reviewed. No pertinent surgical history.      LEARNING ASSESSMENT    Assessment completed:  Yes    Learner name:  Tamara    Learner: Patient    Learning Barriers:  Learning  barriers: No Barriers    Preferred Language: Other: Yoruba     Needed: No    Education Provided:   Method: Discussion, Handout and Demonstration  Readiness: acceptance  Response: Verbalizes understanding      CO-LEARNER ASSESSMENT:    Completed: No            OBJECTIVE MEASUREMENTS/DATA:    Time In Session:  Start Time: 1100  Stop Time: 1200  Time Calculation (min): 60 min   Assessment and Plan - 01/18/23 1431        Assessment    Plan of Care reviewed and patient/family in agreement Yes     System Pathology/Pathophysiology Noted musculoskeletal;neuromuscular     Functional Limitations in Following Categories (PT Eval) home management;community/leisure     Rehab Potential/Prognosis good, to achieve stated therapy goals     Problem List decreased ROM;decreased flexibility;decreased strength;impaired sensation;impaired postural control;impaired motor control;pain     Clinical Assessment Tamara Omalley is a 72 year-old female with a history of lumbar disc herniation referred to outpatient physical therapy two months post-op transpedicular decompression. She presents with limited lumbar range of motion, decreased left lower extremity flexibility, a loss of left knee sensation, signs of peripheral neural sensitivity, weakness throughout the left lower extremity, gait deviations, and decreased independence in functional mobility. The patient has limited tolerance to loading secondary to production of left thigh pain, and overall activity is limited due to symptoms. Ms. Omalley will benefit from physical therapy to address impairments, improve functional strength and endurance, and increase daily functional mobility.     Planned Services CPT 70948 Therapeutic exercises;CPT 56086 Therapeutic activities;CPT 03877 Neuromuscular Reeducation;CPT 20909 Manual therapy;CPT 38463 Gait training;CPT 49803 Hot/Cold Packs therapy                General Information - 01/18/23 1057        Session Details    Document Type  initial evaluation        General Information    Onset of Illness/Injury or Date of Surgery 11/15/22     Referring Physician Von Dutta MD     History of present illness/functional impairment The patient had a sudden onset of lower back pain waking up in the morning late July 2022 and visited the ED. A lumbar disc herniation was detected on imaging. She underwent decompression surgery 11/15/22. The patient still experiences pain in her left anterior thigh, worse in the evening. It affects her ability to walk, and increasing activity worsens symptoms. The patient is still able to log 1500 steps per day, but she spends more time lying down because of the lower extremity pain. The patient reports altered sensation around the left knee and a gripping type pain. Movement can sometimes temporarily relieve the gripping pain around the left knee. The patient has also had chronic rectal pain since 2016.     Limitations/Impairments hearing     Patient/Family/Caregiver Comments/Observations The patient reports left thigh and knee pain increasing throughout the day with loading activity.     Existing Precautions/Restrictions other (see comments)     Precautions comments history of breast cancer, hypertension         Services    Do You Speak a Language Other Than English at Home? yes   Chinese    Is an  Needed/Used? No                Pain/Vitals - 01/18/23 1057        Pain Assessment    Currently in pain Yes     Preferred Pain Scale number (Numeric Rating Pain Scale)     Pain Side/Orientation left     Pain: Body location Leg     Pain Rating (0-10): Pre Activity 1     Pain Rating (0-10): Activity 5     Pain Rating (0-10): Post Activity 1     Pain Level at Best 0     Pain Level at Worst 10     Pain Description intermittent        Pre Activity Vital Signs    /78     BP Location Left upper arm     BP Method Manual     Patient Position Sitting        Pain Intervention    Intervention  eval only     Post  Intervention Comments no overall change                Falls/Food Screening - 01/18/23 1057        Initial Falls Assessment    One or more falls in the last year No                PT - 01/18/23 1057        Physical Therapy    Physical Therapy Specialty Spine PT        PT Plan    Frequency of treatment 2 times/week     PT Duration 8 weeks     PT Cert From 01/18/23     PT Cert To 03/17/23     Date PT POC was sent to provider 01/18/23     Signed PT Plan of Care received?  No                   Living Environment    Living Environment - 01/18/23 1057        Living Environment    People in Home spouse     Living Arrangements house     Transportation Concerns car, none               PLOF:    Prior Level of Function - 01/18/23 1057        OTHER    Previous level of function The patient was previously independent with household activity and more active in community walking. Left leg pain currently limits standing and walking.               Myotomes and Dermatomes    Myotomes/Dermatomes - 01/18/23 1057        Myotome/Dermatome    Myotome/Dermatome Testing Dermatome Testing (Group);Lumbar Myotome Testing (Group)        Lumbar Myotome Testing    Left L2 Myotome: Hip Flexors impaired   4    Right L2 Myotome: Hip Flexors intact     Left L3 Myotome: Knee Extensors impaired   3+    Right L3 Myotome: Knee Extensors intact     Left L4 Myotome: Ankle Dorsiflexors and Invertors impaired   4-    Right L4 Myotome: Ankle Dorsiflexors and Invertors intact     Left L5 Myotome: Toe Extensors impaired   4-    Right L5 Myotome: Toe Extensors intact     Left S1 Myotome: Foot Evertors impaired   4    Right S1 Myotome: Foot Evertors intact     Left S2 Myotome: Ankle Plantarflexors impaired   4-    Right S2 Myotome: Ankle Plantarflexors intact     Left S1 & S2 Myotome: Knee Flexors impaired   4-    Right S1 & S2 Myotome: Knee Flexors intact        Dermatome Testing    Left L2 Dermatome intact light touch sensation     Right L2 Dermatome intact  light touch sensation     Left L3 Dermatome impaired light touch sensation     Right L3 Dermatome intact light touch sensation     Left L4 Dermatome intact light touch sensation     Right L4 Dermatome intact light touch sensation     Left L5 Dermatome intact light touch sensation     Right L5 Dermatome intact light touch sensation     Left S1 Dermatome intact light touch sensation     Right S1 Dermatome intact light touch sensation     Left S2 Dermatome intact light touch sensation     Right S2 Dermatome intact light touch sensation               ROM    Range of Motion - 01/18/23 1100        Lumbar AROM    Lumbar Flexion 50     Lumbar Extension 50     Lumbar Left SB 50     Lumbar Right SB 50     Comments % expected motion        Additional ROM Measurements    Additional ROM  hip flexors tight on left side     Additional ROM  SLR equal B               MMT    Manual Muscle Tests - 01/18/23 1057        RIGHT: Lower Extremity Manual Muscle Test Assessment    Hip Flexion gross movement (5/5) normal     Hip Extension gross movement (4+/5) good plus     Hip Abduction gross movement (5/5) normal        LEFT: Lower Extremity Manual Muscle Test Assessment    Hip Flexion gross movement (4/5) good     Hip Extension gross movement (4/5) good     Hip Abduction gross movement (4/5) good        Additional Manual Muscle Tests    Additional MMT See myotome section for other testing               Ortho Special Tests    Orthopedic Special Tests - 01/18/23 1400        Neurodynamic     Slump Test Right - Negative;Left - Negative     SLR Right - Negative;Left - Negative     PKB Right - Negative;Left - Positive               Gait and Mobility    Gait and Mobility - 01/18/23 1057        Gait Training    Kimball, Gait modified independence     Variable surfaces Flat surface     Assistive Device cane, straight     Comment (Gait/Stairs) The patient ambulates with decreased left stance, reduced stride length, and decreased left hip  extension in stance. She frequently switches side with the cane.        Stairs Assessment    Arthur, Stairs modified independence     Assistive Device railing     Ascending Stairs Technique step-over-step     Descending Stairs Technique step-to-step               Balance/Posture    Balance and Posture - 01/18/23 1057        Postural Deviations    Postural Deviations low back;pelvis     Low Back flattened     Pelvis posterior pelvic tilt        Posture    Posture postural deviations               Outcome Measures    PT Outcome Measures - 01/18/23 1057        Objective Outcome Measures    Gait Speed (m/sec) 0.51 m/sec     Five Times to Sit to Stand (FTSTS) 20.26 seconds        Subjective Outcome Measures    Oswestry taken home - to be returned next visit                  Goals        Patient Stated    •  <enter goal here> (pt-stated)        Other    •  Mutually agreed upon pain goal       Mutually agreed upon pain goal: intermittent symptoms 0/10 to 2/10 with prolonged weight bearing      •  PT lumbar goals       Short Term Goals Time Frame Result Comment   L LE MMT >/= 4/5 throughout for increased stability in stance 4 weeks     FTSTS </= 18 seconds 4 weeks     Oswestry score improves by 10% 4 weeks  To be returned 2nd visit   6 minute walk to be assessed 4 weeks     Pt ambulates with symmetrical gait >/= 0.7 m/sec with SPC  4 weeks     Pt records >/= 2000 steps per day 4 weeks     Pt will stand with support >/= 15 minutes with pain </= 3/10 4 weeks     Pt will be independent with basic HEP for initial reduction of symptoms 4 weeks        Long Term Goals Time Frame Result Comment   L LE MMT >/= 4+/5 throughout for increased stability in stance  8 weeks     FTSTS </= 15 seconds  8 weeks     Oswestry score improves by 20%  8 weeks  Goal may be modified   Pt ambulates with symmetrical gait >/= 1.0 m/sec without AD  8 weeks     Pt records >/= 3000 steps per day  8 weeks     Pt ascends and descends a flight of steps  with reciprocal pattern using rail  8 weeks     Pt will stand with support >/= 30 minutes at a time with pain </= 2/10  8 weeks     Pt will be independent with advanced HEP for maintenance  8 weeks                     TREATMENT PLAN:    ORTHO PT FLOWSHEET    Exercises Current Session Time Completed (Y/N/G)   MODALITIES- HEAT/ICE (71230) TOTAL TIME FOR SESSION Not performed    Heat     Ice     MODALITIES - E-STIM (18769) TOTAL TIME FOR SESSION Not performed    E-stim/H-Wave     THER ACT (29855) TOTAL TIME FOR SESSION 8-22 Minutes    Assessment  TONY to be returned    Vitals/pain See note Y   HEP 1/18/23: hip flexor stretch, slouch correction, sit to stand Y   Education 1/18/23: results of exam, plan of care - patient verbalizes agreement and understanding Y   Body Mechanics/Work Training     GROUP (38475)  Not performed    THER EX (79844) TOTAL TIME FOR SESSION Not performed    CARDIOVASCULAR      Nu Step          STRENGTHENING     Bridge      Clamshell      Side lying skater     Sit to stand with band     Seated march     Palof press     Step up     Standing reverse chop          STRETCHING/ROM     Hip flexor stretch     Prone knee flexion     Standing lunge at step     Ball rolls on wall          NEURO RE-ED (82832) TOTAL TIME FOR SESSION Not performed    Slouch correction     Deep diaphragmatic breathing     Jessica step overs     Standing stool taps          GAIT TRAINING (94005) TOTAL TIME FOR SESSION Not performed    Gait training with long strides     Agility ladder walk     MANUAL (06091) TOTAL TIME FOR SESSION Not performed    Hip flexor manual release          Y = yes; N = no; G = group          ASSESSMENT:    This 72 y.o. year old female presents to PT with above stated diagnosis. Physical Therapy evaluation reveals decreased ROM, decreased flexibility, decreased strength, impaired sensation, impaired postural control, impaired motor control, pain resulting in home management, community/leisure limitations.  Tamara Omalley will benefit from skilled PT services to address limitation, work towards rehab and patient goals and maximize PLOF of chosen ADLs.     Planned Services: The patient's treatment will include CPT 41140 Therapeutic exercises, CPT 44086 Therapeutic activities, CPT 83902 Neuromuscular Reeducation, CPT 79519 Manual therapy, CPT 82803 Gait training, CPT 88003 Hot/Cold Packs therapy, .

## 2023-01-18 NOTE — PROGRESS NOTES
Referring Provider: By co-signing this Plan of Care (POC) either electronically or physically you agree to the following:    I have reviewed the the Plan of Care established by the therapist within this document and certify that the services are skilled and medically necessary. I have reviewed the plan and recommend that these services continue to meet the goals stated in this document.       EXTERNAL PROVIDER FAXING BACK:    PHYSICIAN SIGNATURE: __________________________________     DATE: ___________________  TIME: _____________    IMPORTANT:  If returning this Plan of Care by fax, please fax back ONLY the signature page.   _________________________________________________________________________      [facility] Fax: [phone]        PT EVALUATION FOR OUTPATIENT THERAPY    Patient: Tamara Omalley  MRN: [id number]  : 1950 72 y.o.   Referring Physician: Von Dutta MD  Date of Visit: 2023      Certification Dates:  23 through 23         Recommended Frequency & Duration:  2 times/week for up to 8 weeks     Diagnosis:   1. Lumbar disc herniation        Chief Complaints:   Chief Complaint   Patient presents with   • Dec Strength   • Pain   • Difficulty Walking   • Decreased Mobility       Precautions: other (see comments)  Precautions additional comments: history of breast cancer, hypertension    Past Medical History:   Past Medical History:   Diagnosis Date   • Breast cancer (CMS/HCC)    • Hypertension        Past Surgical History: History reviewed. No pertinent surgical history.      LEARNING ASSESSMENT    Assessment completed:  Yes    Learner name:  Tamara    Learner: Patient    Learning Barriers:  Learning barriers: No Barriers    Preferred Language: Other: Chinese     Needed: No    Education Provided:   Method: Discussion, Handout and Demonstration  Readiness: acceptance  Response: Verbalizes understanding      CO-LEARNER ASSESSMENT:    Completed:  No            OBJECTIVE MEASUREMENTS/DATA:    Time In Session:  Start Time: 1100  Stop Time: 1200  Time Calculation (min): 60 min   Assessment and Plan - 01/18/23 1431        Assessment    Plan of Care reviewed and patient/family in agreement Yes     System Pathology/Pathophysiology Noted musculoskeletal;neuromuscular     Functional Limitations in Following Categories (PT Eval) home management;community/leisure     Rehab Potential/Prognosis good, to achieve stated therapy goals     Problem List decreased ROM;decreased flexibility;decreased strength;impaired sensation;impaired postural control;impaired motor control;pain     Clinical Assessment Tamara Omalley is a 72 year-old female with a history of lumbar disc herniation referred to outpatient physical therapy two months post-op transpedicular decompression. She presents with limited lumbar range of motion, decreased left lower extremity flexibility, a loss of left knee sensation, signs of peripheral neural sensitivity, weakness throughout the left lower extremity, gait deviations, and decreased independence in functional mobility. The patient has limited tolerance to loading secondary to production of left thigh pain, and overall activity is limited due to symptoms. Ms. Omalley will benefit from physical therapy to address impairments, improve functional strength and endurance, and increase daily functional mobility.     Planned Services CPT 21691 Therapeutic exercises;CPT 11584 Therapeutic activities;CPT 13785 Neuromuscular Reeducation;CPT 94506 Manual therapy;CPT 04622 Gait training;CPT 83178 Hot/Cold Packs therapy                General Information - 01/18/23 1057        Session Details    Document Type initial evaluation        General Information    Onset of Illness/Injury or Date of Surgery 11/15/22     Referring Physician Von Dutta MD     History of present illness/functional impairment The patient had a sudden onset of lower back pain waking up in  the morning late July 2022 and visited the ED. A lumbar disc herniation was detected on imaging. She underwent decompression surgery 11/15/22. The patient still experiences pain in her left anterior thigh, worse in the evening. It affects her ability to walk, and increasing activity worsens symptoms. The patient is still able to log 1500 steps per day, but she spends more time lying down because of the lower extremity pain. The patient reports altered sensation around the left knee and a gripping type pain. Movement can sometimes temporarily relieve the gripping pain around the left knee. The patient has also had chronic rectal pain since 2016.     Limitations/Impairments hearing     Patient/Family/Caregiver Comments/Observations The patient reports left thigh and knee pain increasing throughout the day with loading activity.     Existing Precautions/Restrictions other (see comments)     Precautions comments history of breast cancer, hypertension         Services    Do You Speak a Language Other Than English at Home? yes   Faroese    Is an  Needed/Used? No                Pain/Vitals - 01/18/23 1057        Pain Assessment    Currently in pain Yes     Preferred Pain Scale number (Numeric Rating Pain Scale)     Pain Side/Orientation left     Pain: Body location Leg     Pain Rating (0-10): Pre Activity 1     Pain Rating (0-10): Activity 5     Pain Rating (0-10): Post Activity 1     Pain Level at Best 0     Pain Level at Worst 10     Pain Description intermittent        Pre Activity Vital Signs    /78     BP Location Left upper arm     BP Method Manual     Patient Position Sitting        Pain Intervention    Intervention  eval only     Post Intervention Comments no overall change                Falls/Food Screening - 01/18/23 1057        Initial Falls Assessment    One or more falls in the last year No                PT - 01/18/23 1057        Physical Therapy    Physical Therapy Specialty Spine  PT        PT Plan    Frequency of treatment 2 times/week     PT Duration 8 weeks     PT Cert From 01/18/23     PT Cert To 03/17/23     Date PT POC was sent to provider 01/18/23     Signed PT Plan of Care received?  No                   Living Environment    Living Environment - 01/18/23 1057        Living Environment    People in Home spouse     Living Arrangements house     Transportation Concerns car, none               PLOF:    Prior Level of Function - 01/18/23 1057        OTHER    Previous level of function The patient was previously independent with household activity and more active in community walking. Left leg pain currently limits standing and walking.               Myotomes and Dermatomes    Myotomes/Dermatomes - 01/18/23 1057        Myotome/Dermatome    Myotome/Dermatome Testing Dermatome Testing (Group);Lumbar Myotome Testing (Group)        Lumbar Myotome Testing    Left L2 Myotome: Hip Flexors impaired   4    Right L2 Myotome: Hip Flexors intact     Left L3 Myotome: Knee Extensors impaired   3+    Right L3 Myotome: Knee Extensors intact     Left L4 Myotome: Ankle Dorsiflexors and Invertors impaired   4-    Right L4 Myotome: Ankle Dorsiflexors and Invertors intact     Left L5 Myotome: Toe Extensors impaired   4-    Right L5 Myotome: Toe Extensors intact     Left S1 Myotome: Foot Evertors impaired   4    Right S1 Myotome: Foot Evertors intact     Left S2 Myotome: Ankle Plantarflexors impaired   4-    Right S2 Myotome: Ankle Plantarflexors intact     Left S1 & S2 Myotome: Knee Flexors impaired   4-    Right S1 & S2 Myotome: Knee Flexors intact        Dermatome Testing    Left L2 Dermatome intact light touch sensation     Right L2 Dermatome intact light touch sensation     Left L3 Dermatome impaired light touch sensation     Right L3 Dermatome intact light touch sensation     Left L4 Dermatome intact light touch sensation     Right L4 Dermatome intact light touch sensation     Left L5 Dermatome intact  light touch sensation     Right L5 Dermatome intact light touch sensation     Left S1 Dermatome intact light touch sensation     Right S1 Dermatome intact light touch sensation     Left S2 Dermatome intact light touch sensation     Right S2 Dermatome intact light touch sensation               ROM    Range of Motion - 01/18/23 1100        Lumbar AROM    Lumbar Flexion 50     Lumbar Extension 50     Lumbar Left SB 50     Lumbar Right SB 50     Comments % expected motion        Additional ROM Measurements    Additional ROM  hip flexors tight on left side     Additional ROM  SLR equal B               MMT    Manual Muscle Tests - 01/18/23 1057        RIGHT: Lower Extremity Manual Muscle Test Assessment    Hip Flexion gross movement (5/5) normal     Hip Extension gross movement (4+/5) good plus     Hip Abduction gross movement (5/5) normal        LEFT: Lower Extremity Manual Muscle Test Assessment    Hip Flexion gross movement (4/5) good     Hip Extension gross movement (4/5) good     Hip Abduction gross movement (4/5) good        Additional Manual Muscle Tests    Additional MMT See myotome section for other testing               Ortho Special Tests    Orthopedic Special Tests - 01/18/23 1400        Neurodynamic     Slump Test Right - Negative;Left - Negative     SLR Right - Negative;Left - Negative     PKB Right - Negative;Left - Positive               Gait and Mobility    Gait and Mobility - 01/18/23 1057        Gait Training    [place], Gait modified independence     Variable surfaces Flat surface     Assistive Device cane, straight     Comment (Gait/Stairs) The patient ambulates with decreased left stance, reduced stride length, and decreased left hip extension in stance. She frequently switches side with the cane.        Stairs Assessment    [place], Stairs modified independence     Assistive Device railing     Ascending Stairs Technique step-over-step     Descending Stairs Technique step-to-step                Balance/Posture    Balance and Posture - 01/18/23 1057        Postural Deviations    Postural Deviations low back;pelvis     Low Back flattened     Pelvis posterior pelvic tilt        Posture    Posture postural deviations               Outcome Measures    PT Outcome Measures - 01/18/23 1057        Objective Outcome Measures    Gait Speed (m/sec) 0.51 m/sec     Five Times to Sit to Stand (FTSTS) 20.26 seconds        Subjective Outcome Measures    Oswestry taken home - to be returned next visit                  Goals        Patient Stated    •  <enter goal here> (pt-stated)        Other    •  Mutually agreed upon pain goal       Mutually agreed upon pain goal: intermittent symptoms 0/10 to 2/10 with prolonged weight bearing      •  PT lumbar goals       Short Term Goals Time Frame Result Comment   L LE MMT >/= 4/5 throughout for increased stability in stance 4 weeks     FTSTS </= 18 seconds 4 weeks     Oswestry score improves by 10% 4 weeks  To be returned 2nd visit   6 minute walk to be assessed 4 weeks     Pt ambulates with symmetrical gait >/= 0.7 m/sec with SPC  4 weeks     Pt records >/= 2000 steps per day 4 weeks     Pt will stand with support >/= 15 minutes with pain </= 3/10 4 weeks     Pt will be independent with basic HEP for initial reduction of symptoms 4 weeks        Long Term Goals Time Frame Result Comment   L LE MMT >/= 4+/5 throughout for increased stability in stance  8 weeks     FTSTS </= 15 seconds  8 weeks     Oswestry score improves by 20%  8 weeks  Goal may be modified   Pt ambulates with symmetrical gait >/= 1.0 m/sec without AD  8 weeks     Pt records >/= 3000 steps per day  8 weeks     Pt ascends and descends a flight of steps with reciprocal pattern using rail  8 weeks     Pt will stand with support >/= 30 minutes at a time with pain </= 2/10  8 weeks     Pt will be independent with advanced HEP for maintenance  8 weeks                     TREATMENT PLAN:    ORTHO PT  FLOWSHEET    Exercises Current Session Time Completed (Y/N/G)   MODALITIES- HEAT/ICE (83894) TOTAL TIME FOR SESSION Not performed    Heat     Ice     MODALITIES - E-STIM (15086) TOTAL TIME FOR SESSION Not performed    E-stim/H-Wave     THER ACT (11751) TOTAL TIME FOR SESSION 8-22 Minutes    Assessment  TONY to be returned    Vitals/pain See note Y   HEP 1/18/23: hip flexor stretch, slouch correction, sit to stand Y   Education 1/18/23: results of exam, plan of care - patient verbalizes agreement and understanding Y   Body Mechanics/Work Training     GROUP (40189)  Not performed    THER EX (94502) TOTAL TIME FOR SESSION Not performed    CARDIOVASCULAR      Nu Step          STRENGTHENING     Bridge      Clamshell      Side lying skater     Sit to stand with band     Seated march     Palof press     Step up     Standing reverse chop          STRETCHING/ROM     Hip flexor stretch     Prone knee flexion     Standing lunge at step     Ball rolls on wall          NEURO RE-ED (42922) TOTAL TIME FOR SESSION Not performed    Slouch correction     Deep diaphragmatic breathing     Jessica step overs     Standing stool taps          GAIT TRAINING (78806) TOTAL TIME FOR SESSION Not performed    Gait training with long strides     Agility ladder walk     MANUAL (67961) TOTAL TIME FOR SESSION Not performed    Hip flexor manual release          Y = yes; N = no; G = group          ASSESSMENT:    This 72 y.o. year old female presents to PT with above stated diagnosis. Physical Therapy evaluation reveals decreased ROM, decreased flexibility, decreased strength, impaired sensation, impaired postural control, impaired motor control, pain resulting in home management, community/leisure limitations. Tamara Omalley will benefit from skilled PT services to address limitation, work towards rehab and patient goals and maximize PLOF of chosen ADLs.     Planned Services: The patient's treatment will include CPT 26052 Therapeutic exercises, CPT  11423 Therapeutic activities, CPT 97655 Neuromuscular Reeducation, CPT 85835 Manual therapy, CPT 68402 Gait training, CPT 16461 Hot/Cold Packs therapy, .

## 2023-02-10 ENCOUNTER — DOCUMENTATION (OUTPATIENT)
Dept: PHYSICAL THERAPY | Age: 73
End: 2023-02-10
Payer: COMMERCIAL

## 2023-02-10 NOTE — PROGRESS NOTES
PT DISCHARGE NOTE FOR OUTPATIENT THERAPY    Patient: Tamara Omalley MRN: [id number]  : 1950 72 y.o.  Referring Physician: No ref. provider found  Date of Visit: 2/10/2023      Certification Dates: 23 through 23    Total Visit Count: 1    Chief Complaints:  Chief Complaint   Patient presents with   • PT Discharge     The patient did not re-schedule follow up appointments.       Precautions:         TODAY'S VISIT:    Time In Session:            PT - 02/10/23 0827        Physical Therapy    Physical Therapy Specialty Spine PT        PT Plan    Frequency of treatment 2 times/week     PT Duration 8 weeks     PT Cert From 23     PT Cert To 23     Date PT POC was sent to provider 23     Signed PT Plan of Care received?  Yes                   OBJECTIVE MEASUREMENTS/DATA:    None taken    ROM and MMT        Some values may be hidden. Unless noted otherwise, only the newest values recorded on each date are displayed.         PT UE ROM Measurements 23   No data to display.      PT LE ROM Measurements 23   No data to display.      PT Cervical/Lumbar/Other ROM Measurements 23   AROM: Lumbar Flexion 50   AROM: Lumar Extension 50   AROM: Left Lumbar SB 50   AROM: Right Lumbar SB 50   AROM: Lumbar Comments % expected motion   Additional ROM  hip flexors tight on left side   Additional ROM  SLR equal B      Hand ROM Measurements 23   No data to display.      PT UE MMT Measurements 23   No data to display.      PT LE MMT 23   Right Hip Flexion (5/5) normal   Left Hip Flexion (4/5) good   Right Hip Extension (4+/5) good plus   Left Hip Extension (4/5) good   Right Hip ABD (5/5) normal   Left Hip ABD (4/5) good           Outcome Measures    PT OBJECTIVE Outcome Measures 23   Gait Speed (m/sec) 0.51 m/sec   Five Times Sit to Stand 20.26 seconds      PT SUBJECTIVE Outcome Measures 23   Oswestry taken home - to be returned next visit             Today's  Treatment:    Education provided:  None/NA         Goals Addressed                 This Visit's Progress    • Mutually agreed upon pain goal        Mutually agreed upon pain goal: intermittent symptoms 0/10 to 2/10 with prolonged weight bearing - Not re-assessed      • PT lumbar goals        Short Term Goals Time Frame Result Comment   L LE MMT >/= 4/5 throughout for increased stability in stance 4 weeks  Not re-assessed   FTSTS </= 18 seconds 4 weeks  Not re-assessed   Oswestry score improves by 10% 4 weeks  Not assessed   6 minute walk to be assessed 4 weeks  Not assessed   Pt ambulates with symmetrical gait >/= 0.7 m/sec with SPC  4 weeks  Not re-assessed   Pt records >/= 2000 steps per day 4 weeks  Not re-assessed   Pt will stand with support >/= 15 minutes with pain </= 3/10 4 weeks  Not re-assessed   Pt will be independent with basic HEP for initial reduction of symptoms 4 weeks  Not re-assessed      Long Term Goals Time Frame Result Comment   L LE MMT >/= 4+/5 throughout for increased stability in stance  8 weeks  Not re-assessed   FTSTS </= 15 seconds  8 weeks  Not re-assessed   Oswestry score improves by 20%  8 weeks  Not assessed   Pt ambulates with symmetrical gait >/= 1.0 m/sec without AD  8 weeks  Not re-assessed   Pt records >/= 3000 steps per day  8 weeks  Not re-assessed   Pt ascends and descends a flight of steps with reciprocal pattern using rail  8 weeks  Not re-assessed   Pt will stand with support >/= 30 minutes at a time with pain </= 2/10  8 weeks  Not re-assessed   Pt will be independent with advanced HEP for maintenance  8 weeks  Not re-assessed               Discharge information for [facility]:    Reason for D/C: Patient has not returned  Was care interrupted for medical reason?: No  Did the patient demonstrate increased independence: No  Has the patient returned to productive activity?: No  Skin integrity: Unable to assess

## 2023-02-16 ENCOUNTER — TELEPHONE (OUTPATIENT)
Dept: NEUROSURGERY | Facility: CLINIC | Age: 73
End: 2023-02-16
Payer: COMMERCIAL

## 2023-03-17 ENCOUNTER — OFFICE VISIT (OUTPATIENT)
Dept: NEUROSURGERY | Facility: CLINIC | Age: 73
End: 2023-03-17
Payer: COMMERCIAL

## 2023-03-17 VITALS
HEIGHT: 62 IN | SYSTOLIC BLOOD PRESSURE: 140 MMHG | HEART RATE: 63 BPM | BODY MASS INDEX: 23.41 KG/M2 | OXYGEN SATURATION: 97 % | DIASTOLIC BLOOD PRESSURE: 80 MMHG

## 2023-03-17 DIAGNOSIS — M54.16 LUMBAR RADICULOPATHY: Primary | ICD-10-CM

## 2023-03-17 PROCEDURE — 3008F BODY MASS INDEX DOCD: CPT | Performed by: NEUROLOGICAL SURGERY

## 2023-03-17 PROCEDURE — 99214 OFFICE O/P EST MOD 30 MIN: CPT | Performed by: NEUROLOGICAL SURGERY

## 2023-03-17 RX ORDER — PREDNISONE 20 MG/1
20 TABLET ORAL DAILY
Qty: 14 TABLET | Refills: 0 | Status: SHIPPED | OUTPATIENT
Start: 2023-03-17 | End: 2023-12-02 | Stop reason: ENTERED-IN-ERROR

## 2023-03-17 RX ORDER — OXYCODONE HYDROCHLORIDE 5 MG/1
10 TABLET ORAL
COMMUNITY

## 2023-03-17 ASSESSMENT — PAIN SCALES - GENERAL: PAINLEVEL: 5

## 2023-03-17 NOTE — PROGRESS NOTES
"    [facility]  [address] [place] [address], [address]  [place] [address]  Phone: [phone]  Fax: [phone]        Date: 23    Re: Tamara Omalley  : 1950        Reason for visit:  Pain radiating down leg.    History of Present Illness:   Tamara Omalley is a 73 y.o. female who presents in neurosurgical consultation. She has a past medical history of breast carcinoma, proctalgia fugax, hyperlipidemia, hypertension, osteopenia and anal fissure who presented to [facility] ED on 22 with left lower extremity pain that started the same day. She reported left inferior knee and shin pain as well as left sided lower back pain that radiates down her lateral left leg to just below her knee. X-ray of the lumbar spine and left hip with degenerative joint disease seen but no acute fractures or abnormalities. MRI of the lumbar spine demonstrated multilevel lumbar degenerative changes and small left foraminal disc herniation at L3-L4. Patient's biggest concern of her inferior knee/anterior shin pain was not thought to align with the rest of her radicular pain. However her left radicular pain was thought likely due to her L3 compression. Conservative treatment was recommended in the form of injections, medications, and bedrest.    The patient decided for more aggressive treatment and underwent left L3-4 far lateral discectomy on 11/15/22 Dr. Dutta with [employer].    The patient states immediately after surgery she felt much better, however, after developing \"inflammation\" her pain worsened.  She attempted physical therapy in February which she states made he pain worse.  Currently she feels she is only able to ambulate for approximately 10 minutes.  She states walking makes her left knee pain worsen and it improves with rest and naproxen.      Medical History:  has a past medical history of Breast cancer (CMS/HCC) and Hypertension.    Surgical History: Left L3/4 far lateral " discectomy on 11/15/22.    Family History: family history includes Breast cancer in her biological mother; Osteopenia in her biological father.  Family history non-contributory to neurological condition.    Social History:   Social History     Socioeconomic History   • Marital status:      Spouse name: None   • Number of children: None   • Years of education: None   • Highest education level: None   Tobacco Use   • Smoking status: Never   • Smokeless tobacco: Never   Substance and Sexual Activity   • Alcohol use: Not Currently   • Drug use: Never   • Sexual activity: Defer     Social Determinants of Health     Food Insecurity: No Food Insecurity (7/31/2022)    Hunger Vital Sign    • Worried About Running Out of Food in the Last Year: Never true    • Ran Out of Food in the Last Year: Never true        Allergies:   Allergies   Allergen Reactions   • Anastrozole      Other reaction(s): Arthralgias, Myalgias  Pain in finger joints  Pain in finger joints     • Iodine And Iodide Containing Products Rash     Topical Iodine  Topical Iodine         Medications:   Current Outpatient Medications   Medication Sig Dispense Refill   • aspirin 81 mg enteric coated tablet Takes every other day.     • cholecalciferol, vitamin D3, 400 unit (10 mcg) tablet tablet Take 1 tablet by mouth See admin instr.     • cyclobenzaprine (FLEXERIL) 5 mg tablet Take 1 tablet (5 mg total) by mouth 3 (three) times a day as needed for muscle spasms. 90 tablet 0   • gabapentin (NEURONTIN) 100 mg capsule TAKE 1 CAPSULE BY MOUTH 4 TIMES A DAY     • gabapentin (NEURONTIN) 300 mg capsule TAKE 1 CAPSULE BY MOUTH 4 TIMES A DAY     • ibuprofen (MOTRIN) 600 mg tablet Take 1 tablet (600 mg total) by mouth 4 (four) times a day as needed for mild pain (pain) for up to 4 days. 90 tablet 0   • metoprolol tartrate (LOPRESSOR) 25 mg tablet Take 12.5 mg by mouth.     • oxyCODONE (ROXICODONE) 15 mg immediate release tablet Take 15 mg by mouth Every 4 hours as  "needed.     • polyethylene glycol (MIRALAX) 17 gram/dose powder Take 17 g by mouth.     • rosuvastatin (CRESTOR) 10 mg tablet Take 10 mg by mouth once daily.     • triamterene-hydrochlorothiazide (MAXZIDE-25) 37.5-25 mg per tablet Take 0.5 tablets by mouth once daily.     • valACYclovir (VALTREX) 1 gram tablet TAKE TWO TABLETS BY MOUTH TWICE A DAY AS NEEDED     • zolpidem (AMBIEN) 10 mg tablet TAKE 1 TABLET BY MOUTH DAILY AT BEDTIME AS NEEDED FOR SLEEP.       No current facility-administered medications for this visit.       General physical examination:  The patient is well appearing female, sitting upright in her chair in no acute distress, she appears her stated age.  Her head is atraumatic, normocephalic. Her neck is supple without obvious adenopathy. Oral mucosa is moist. Her peripheral pulses are symmetric and palpable. Extremities without peripheral edema. Her breathing is normal and unlabored.     Vitals:    03/17/23 1504   BP: 140/80   BP Location: Right upper arm   Patient Position: Sitting   Pulse: 63   SpO2: 97%   Height: 1.575 m (5' 2.01\")        Well appearing female in no acute distress.    The patient is awake, alert, oriented x 3, with fluent speech, appropriate attention span, concentration and fund of knowledge.  Remote and recent memory are normal.    Cranial nerve examination reveals full visual fields to confrontation, pupils are equal round reactive to light, extra occular muscles are intact, there is no facial asymmetry and tongue protrudes midline.    On motor examination,    Hip flex      L2, L3 Knee ext    L3, L4 Dorsi  flex    L4, L5 Great toe ext    L5 Plantar flex    S1   L 4+ 4+ 5 5 5   R 5 5 5 5 5   There is no dysmetria on finger to nose testing.  Gait is stable.    Sensation in intact and equal to light touch throughout all 4 extremities.    She has 2+ reflexes throughout, without Wright's sign or ankle clonus.    Her surgical site is well healed.    Data Review:  MRI Lumbar spine " without cheyanne 12/2/23 (outside images and patient did not bring updated MRI):  Alexsandra Sanabria MD - 12/02/2022   Formatting of this note might be different from the original.   CLINICAL INFORMATION: 72-year-old. Left L3-L4 extraforaminal discectomy (11/15/2022). Left lower extremity pain, severe with ambulation.     TECHNIQUE: Routine unenhanced MRI of the lumbar spine.   Contrast: None.     COMPARISON: Multiple prior studies, most recent 8/1/2022.     FINDINGS:     Transitional lumbar anatomy with rudimentary intervertebral disc at S1-S2. There are 5 nonrib-bearing lumbar-type vertebrae. For the purposes of this exam, the slice series 10, image 18 corresponds to the L5-S1 level.     Unchanged straightening of the lumbar spine lordosis. Trace new grade 1 anterolisthesis of L5 on S1.   Lumbar vertebrae are normal in height. Degenerative endplate marrow signal changes. Multilevel intervertebral disc height loss, desiccation and Schmorl's nodes, worst at the L5-S1 level. Postsurgical changes from left L3-L4 extraforaminal discectomy with small fluid collection lateral to the L3-L4 facet measuring approximately 0.9 x 1.2 x 1.4 cm (series 8, image 25 and series 7, image 4), edema-like signal in the left posterior paraspinal muscles, and a fluid collection in the left paramedian soft tissue, extending from the T12-L1 to the S1-S2 level, measuring approximately 1.3 x 2.7 x 15.7 cm. These fluid collections, likely reflect combination of seroma and some degraded blood.     L1-2: No posterior disc abnormality. Mild bilateral facet arthropathy. No spinal canal stenosis or neural foraminal narrowing.     L2-3: No posterior disc abnormality. Moderate facet arthropathy. No spinal canal stenosis or neural foraminal narrowing.     L3-4: Mild disc bulge and trace endplate ridging. Moderate facet arthropathy. Mild spinal canal stenosis. No right neural foraminal narrowing. Complete effacement of fat planes in the left neural foramen is  likely postsurgical change without obvious residual disc bulge. New mild T2 hyperintensity of the exiting L3 nerve is likely reactive change.     L4-5: Posterior osteophyte and mild disc bulge with central disc fissure. Moderate bilateral facet arthropathy. Mild spinal canal stenosis and mild left lateral recess narrowing. No bilateral neural foraminal narrowing.     L5-S1: New trace anterolisthesis. Tiny disc bulge. Severe right and moderate left facet arthropathy. No spinal canal stenosis. Mild left, no right foraminal narrowing.     Normal appearance and position of the conus medullaris, which terminates at L1.     IMPRESSION:       1.  Interval left L3-L4 extraforaminal discectomy. Multifocal postsurgical fluid lateral to the left L3-L4 facet and subcutaneous back. Complete effacement of the left L3-L4 neural foramen is likely postsurgical change. New mild T2 hyperintensity of the exiting L3 nerve is probably reactive in etiology.   2.  New trace grade 1 anterolisthesis of L5 on S1.       MRI lumbar spine with and without contrast, 8/1/22  CLINICAL HISTORY:  Myelopathy, acute, lumbar spine worsening low back pain.  Patient presents with back and left leg pain.     COMMENT:  Comparison: Lumbar spine radiograph dated 7/31/2022..  Technique: Multiplanar MR imaging of the lumbar spine was performed without  intravenous contrast.     FINDINGS:     Normal lumbar lordosis is maintained. Vertebral body heights are maintained. No  subluxation. Degenerative endplate fatty marrow changes are noted in the lumbar  spine, sacrum, and iliac bones. There is diffuse intervertebral disc desiccation  with disc space narrowing most prominent at L5-S1. No prevertebral or  paravertebral soft tissue edema. Small hemangioma is noted along the superior  endplate of the L3 vertebra. Endplate degenerative changes are most prominent at  L5-S1. Lumbar spinal canal is patent. Scattered Schmorl's nodes are noted.     The conus terminates at  the L1 level. The distal cord is normal in size and  signal characteristics. No abnormal vertebral body or intrathecal enhancement is  identified. However, there is enhancement noted in the left L3-L4 neural  foramina likely associated with the acutely herniated disc.     Level by level assessment:     T12-L1: No disc herniation. No central canal or foraminal narrowing.     L1-L2: Broad-based disc bulge with left foraminal extension. Mild facet  hypertrophy. Mild left foraminal narrowing. No central canal or right foraminal  narrowing.     L2-L3: Broad-based disc bulge with left foraminal extension. Facet hypertrophy  and ligamentum flavum infolding.  Fluid in the right greater than left facet  joints. Mild bilateral foraminal narrowing.  No central canal narrowing.     L3-L4: Broad-based disc bulge with a left foraminal disc protrusion/extrusion  with  facet hypertrophy and ligamentum flavum infolding.  Moderate right and  moderate to severe left foraminal narrowing.  Left foraminal disc  protrusion/extrusion contacts the exiting nerve root within the L3-L4 neural  foramina. No central canal narrowing.     L4-L5: Broad-based disc bulge with left greater than right foraminal extension.  Central annular fissure is noted. Mild facet hypertrophy and ligamentum flavum  infolding.  Mild right and  Moderate left foraminal narrowing.  Mild central  canal narrowing.     L5-S1: Broad-based disc osteophyte complex  with facet hypertrophy and  ligamentum flavum infolding.  Mild right and  Moderate left foraminal narrowing.  Mild central canal narrowing.     Extra vertebral soft tissues: Within normal limits..     --  IMPRESSION:  1.  Multilevel lumbar degenerative changes without severe central canal  narrowing.  2.  Left foraminal disc herniation with focal protrusion/extrusion and mild  enhancement is noted contacting the exiting nerve root in the left L3-L4 neural  Foramina.    X-ray lumbar spine, 7/31/22  CLINICAL HISTORY:  back pain     COMMENT:  3 view lumbar spine. No direct comparison. Mild straightening of the  normal lordotic curvature. Alignment otherwise appears anatomic. Vertebral body  heights appear preserved. No acute fracture seen. Multilevel degenerative disc  change most severe at L5-S1 with disc height loss, endplate sclerosis and  marginal osteophyte formation. Lower lumbar facet arthrosis most marked at  L5-S1. Bilateral SI degenerative joint disease. Increased colonic stool burden.     --  IMPRESSION: No acute fractures seen. Multilevel degenerative disc changes most  severe at L5-S1.    Images were personally reviewed by myself and with the patient.    Assessment and Plan:  In summary, Tamara Omalley is a 73 y.o. female who presented to [facility] ED on 7/31/22 with left leg pain. MRI revealed L3-L4 far lateral disc herniation on the left side. She was recommended conservative treatment with medications, injections, but opted to undergo surgical decompression at [facility] with Dr. Dutta.  She continues to have pain, mild weakness and difficulty ambulating.  She did not bring her most recent MRI and is scheduled for repeat MRI in May.  I reviewed the general post operative course for far lateral microdiscectomy.  She has tried steroids, gabapentin and oxycodone without lasting improvement.  I recommended she increase her neuropathic pain medications, restart physical therapy and obtain an updated MRI which has already been ordered. She would like to retry oral steroids, for which I prescribed her prednisone 20mg daily x 2 weeks.  The patient should continue to follow with her treating surgeon and can follow up with me on a prn basis.  The patient expressed understanding, agrees with the overall plan and will follow up as stated above.  Thank you for giving us the opportunity to care for Tamara Omalley    CAM, PHYLLIS Allen, am scribing for, and in the presence of, Dr. Hussain MD.    IDr. Ruben  MD Hussain, personally performed the services described in this documentation as scribed by Donald Lozano PA-C in my presence, and it is accurate and complete.    I spent 30 minutes on this date of service performing the following activities: obtaining history, performing examination, entering orders, documenting, preparing for visit, obtaining / reviewing records, providing counseling and education, independently reviewing study/studies and communicating results.

## 2023-03-17 NOTE — LETTER
2023     Tova Vargas MD  [address]  [address]  [facility] [place] [address]    Patient: Tamara Omalley  YOB: 1950  Date of Visit: 3/17/2023      Dear Dr. Vargas:    Thank you for referring Tamara Omalley to me for evaluation. Below are my notes for this consultation.    If you have questions, please do not hesitate to call me. I look forward to following your patient along with you.         Sincerely,        Tawana Nixon MD        CC: MD Hussain Jackson Ravichandra, MD  3/17/2023  3:47 PM  Signed      [facility]  [address], [address]  [place] [address]  Phone: [phone]  Fax: [phone]        Date: 23    Re: Tamara Omalley  : 1950        Reason for visit:  Pain radiating down leg.    History of Present Illness:   Tamara Omalley is a 73 y.o. female who presents in neurosurgical consultation. She has a past medical history of breast carcinoma, proctalgia fugax, hyperlipidemia, hypertension, osteopenia and anal fissure who presented to [facility] ED on 22 with left lower extremity pain that started the same day. She reported left inferior knee and shin pain as well as left sided lower back pain that radiates down her lateral left leg to just below her knee. X-ray of the lumbar spine and left hip with degenerative joint disease seen but no acute fractures or abnormalities. MRI of the lumbar spine demonstrated multilevel lumbar degenerative changes and small left foraminal disc herniation at L3-L4. Patient's biggest concern of her inferior knee/anterior shin pain was not thought to align with the rest of her radicular pain. However her left radicular pain was thought likely due to her L3 compression. Conservative treatment was recommended in the form of injections, medications, and bedrest.    The patient decided for more aggressive treatment and underwent left L3-4 far lateral discectomy on  "11/15/22 Dr. Dutta with [facility] system.    The patient states immediately after surgery she felt much better, however, after developing \"inflammation\" her pain worsened.  She attempted physical therapy in February which she states made he pain worse.  Currently she feels she is only able to ambulate for approximately 10 minutes.  She states walking makes her left knee pain worsen and it improves with rest and naproxen.      Medical History:  has a past medical history of Breast cancer (CMS/HCC) and Hypertension.    Surgical History: Left L3/4 far lateral discectomy on 11/15/22.    Family History: family history includes Breast cancer in her biological mother; Osteopenia in her biological father.  Family history non-contributory to neurological condition.    Social History:   Social History     Socioeconomic History   • Marital status:      Spouse name: None   • Number of children: None   • Years of education: None   • Highest education level: None   Tobacco Use   • Smoking status: Never   • Smokeless tobacco: Never   Substance and Sexual Activity   • Alcohol use: Not Currently   • Drug use: Never   • Sexual activity: Defer     Social Determinants of Health     Food Insecurity: No Food Insecurity (7/31/2022)    Hunger Vital Sign    • Worried About Running Out of Food in the Last Year: Never true    • Ran Out of Food in the Last Year: Never true        Allergies:   Allergies   Allergen Reactions   • Anastrozole      Other reaction(s): Arthralgias, Myalgias  Pain in finger joints  Pain in finger joints     • Iodine And Iodide Containing Products Rash     Topical Iodine  Topical Iodine         Medications:   Current Outpatient Medications   Medication Sig Dispense Refill   • aspirin 81 mg enteric coated tablet Takes every other day.     • cholecalciferol, vitamin D3, 400 unit (10 mcg) tablet tablet Take 1 tablet by mouth See admin instr.     • cyclobenzaprine (FLEXERIL) 5 mg tablet Take 1 tablet (5 mg total) by " "mouth 3 (three) times a day as needed for muscle spasms. 90 tablet 0   • gabapentin (NEURONTIN) 100 mg capsule TAKE 1 CAPSULE BY MOUTH 4 TIMES A DAY     • gabapentin (NEURONTIN) 300 mg capsule TAKE 1 CAPSULE BY MOUTH 4 TIMES A DAY     • ibuprofen (MOTRIN) 600 mg tablet Take 1 tablet (600 mg total) by mouth 4 (four) times a day as needed for mild pain (pain) for up to 4 days. 90 tablet 0   • metoprolol tartrate (LOPRESSOR) 25 mg tablet Take 12.5 mg by mouth.     • oxyCODONE (ROXICODONE) 15 mg immediate release tablet Take 15 mg by mouth Every 4 hours as needed.     • polyethylene glycol (MIRALAX) 17 gram/dose powder Take 17 g by mouth.     • rosuvastatin (CRESTOR) 10 mg tablet Take 10 mg by mouth once daily.     • triamterene-hydrochlorothiazide (MAXZIDE-25) 37.5-25 mg per tablet Take 0.5 tablets by mouth once daily.     • valACYclovir (VALTREX) 1 gram tablet TAKE TWO TABLETS BY MOUTH TWICE A DAY AS NEEDED     • zolpidem (AMBIEN) 10 mg tablet TAKE 1 TABLET BY MOUTH DAILY AT BEDTIME AS NEEDED FOR SLEEP.       No current facility-administered medications for this visit.       General physical examination:  The patient is well appearing female, sitting upright in her chair in no acute distress, she appears her stated age.  Her head is atraumatic, normocephalic. Her neck is supple without obvious adenopathy. Oral mucosa is moist. Her peripheral pulses are symmetric and palpable. Extremities without peripheral edema. Her breathing is normal and unlabored.     Vitals:    03/17/23 1504   BP: 140/80   BP Location: Right upper arm   Patient Position: Sitting   Pulse: 63   SpO2: 97%   Height: 1.575 m (5' 2.01\")        Well appearing female in no acute distress.    The patient is awake, alert, oriented x 3, with fluent speech, appropriate attention span, concentration and fund of knowledge.  Remote and recent memory are normal.    Cranial nerve examination reveals full visual fields to confrontation, pupils are equal round " reactive to light, extra occular muscles are intact, there is no facial asymmetry and tongue protrudes midline.    On motor examination,    Hip flex      L2, L3 Knee ext    L3, L4 Dorsi  flex    L4, L5 Great toe ext    L5 Plantar flex    S1   L 4+ 4+ 5 5 5   R 5 5 5 5 5   There is no dysmetria on finger to nose testing.  Gait is stable.    Sensation in intact and equal to light touch throughout all 4 extremities.    She has 2+ reflexes throughout, without Wright's sign or ankle clonus.    Her surgical site is well healed.    Data Review:  MRI Lumbar spine without cheyanne 12/2/23 (outside images and patient did not bring updated MRI):  Alexsandra Sanabria MD - 12/02/2022   Formatting of this note might be different from the original.   CLINICAL INFORMATION: 72-year-old. Left L3-L4 extraforaminal discectomy (11/15/2022). Left lower extremity pain, severe with ambulation.     TECHNIQUE: Routine unenhanced MRI of the lumbar spine.   Contrast: None.     COMPARISON: Multiple prior studies, most recent 8/1/2022.     FINDINGS:     Transitional lumbar anatomy with rudimentary intervertebral disc at S1-S2. There are 5 nonrib-bearing lumbar-type vertebrae. For the purposes of this exam, the slice series 10, image 18 corresponds to the L5-S1 level.     Unchanged straightening of the lumbar spine lordosis. Trace new grade 1 anterolisthesis of L5 on S1.   Lumbar vertebrae are normal in height. Degenerative endplate marrow signal changes. Multilevel intervertebral disc height loss, desiccation and Schmorl's nodes, worst at the L5-S1 level. Postsurgical changes from left L3-L4 extraforaminal discectomy with small fluid collection lateral to the L3-L4 facet measuring approximately 0.9 x 1.2 x 1.4 cm (series 8, image 25 and series 7, image 4), edema-like signal in the left posterior paraspinal muscles, and a fluid collection in the left paramedian soft tissue, extending from the T12-L1 to the S1-S2 level, measuring approximately 1.3 x 2.7 x  15.7 cm. These fluid collections, likely reflect combination of seroma and some degraded blood.     L1-2: No posterior disc abnormality. Mild bilateral facet arthropathy. No spinal canal stenosis or neural foraminal narrowing.     L2-3: No posterior disc abnormality. Moderate facet arthropathy. No spinal canal stenosis or neural foraminal narrowing.     L3-4: Mild disc bulge and trace endplate ridging. Moderate facet arthropathy. Mild spinal canal stenosis. No right neural foraminal narrowing. Complete effacement of fat planes in the left neural foramen is likely postsurgical change without obvious residual disc bulge. New mild T2 hyperintensity of the exiting L3 nerve is likely reactive change.     L4-5: Posterior osteophyte and mild disc bulge with central disc fissure. Moderate bilateral facet arthropathy. Mild spinal canal stenosis and mild left lateral recess narrowing. No bilateral neural foraminal narrowing.     L5-S1: New trace anterolisthesis. Tiny disc bulge. Severe right and moderate left facet arthropathy. No spinal canal stenosis. Mild left, no right foraminal narrowing.     Normal appearance and position of the conus medullaris, which terminates at L1.     IMPRESSION:       1.  Interval left L3-L4 extraforaminal discectomy. Multifocal postsurgical fluid lateral to the left L3-L4 facet and subcutaneous back. Complete effacement of the left L3-L4 neural foramen is likely postsurgical change. New mild T2 hyperintensity of the exiting L3 nerve is probably reactive in etiology.   2.  New trace grade 1 anterolisthesis of L5 on S1.       MRI lumbar spine with and without contrast, 8/1/22  CLINICAL HISTORY:  Myelopathy, acute, lumbar spine worsening low back pain.  Patient presents with back and left leg pain.     COMMENT:  Comparison: Lumbar spine radiograph dated 7/31/2022..  Technique: Multiplanar MR imaging of the lumbar spine was performed without  intravenous contrast.     FINDINGS:     Normal lumbar  lordosis is maintained. Vertebral body heights are maintained. No  subluxation. Degenerative endplate fatty marrow changes are noted in the lumbar  spine, sacrum, and iliac bones. There is diffuse intervertebral disc desiccation  with disc space narrowing most prominent at L5-S1. No prevertebral or  paravertebral soft tissue edema. Small hemangioma is noted along the superior  endplate of the L3 vertebra. Endplate degenerative changes are most prominent at  L5-S1. Lumbar spinal canal is patent. Scattered Schmorl's nodes are noted.     The conus terminates at the L1 level. The distal cord is normal in size and  signal characteristics. No abnormal vertebral body or intrathecal enhancement is  identified. However, there is enhancement noted in the left L3-L4 neural  foramina likely associated with the acutely herniated disc.     Level by level assessment:     T12-L1: No disc herniation. No central canal or foraminal narrowing.     L1-L2: Broad-based disc bulge with left foraminal extension. Mild facet  hypertrophy. Mild left foraminal narrowing. No central canal or right foraminal  narrowing.     L2-L3: Broad-based disc bulge with left foraminal extension. Facet hypertrophy  and ligamentum flavum infolding.  Fluid in the right greater than left facet  joints. Mild bilateral foraminal narrowing.  No central canal narrowing.     L3-L4: Broad-based disc bulge with a left foraminal disc protrusion/extrusion  with  facet hypertrophy and ligamentum flavum infolding.  Moderate right and  moderate to severe left foraminal narrowing.  Left foraminal disc  protrusion/extrusion contacts the exiting nerve root within the L3-L4 neural  foramina. No central canal narrowing.     L4-L5: Broad-based disc bulge with left greater than right foraminal extension.  Central annular fissure is noted. Mild facet hypertrophy and ligamentum flavum  infolding.  Mild right and  Moderate left foraminal narrowing.  Mild central  canal  narrowing.     L5-S1: Broad-based disc osteophyte complex  with facet hypertrophy and  ligamentum flavum infolding.  Mild right and  Moderate left foraminal narrowing.  Mild central canal narrowing.     Extra vertebral soft tissues: Within normal limits..     --  IMPRESSION:  1.  Multilevel lumbar degenerative changes without severe central canal  narrowing.  2.  Left foraminal disc herniation with focal protrusion/extrusion and mild  enhancement is noted contacting the exiting nerve root in the left L3-L4 neural  Foramina.    X-ray lumbar spine, 7/31/22  CLINICAL HISTORY: back pain     COMMENT:  3 view lumbar spine. No direct comparison. Mild straightening of the  normal lordotic curvature. Alignment otherwise appears anatomic. Vertebral body  heights appear preserved. No acute fracture seen. Multilevel degenerative disc  change most severe at L5-S1 with disc height loss, endplate sclerosis and  marginal osteophyte formation. Lower lumbar facet arthrosis most marked at  L5-S1. Bilateral SI degenerative joint disease. Increased colonic stool burden.     --  IMPRESSION: No acute fractures seen. Multilevel degenerative disc changes most  severe at L5-S1.    Images were personally reviewed by myself and with the patient.    Assessment and Plan:  In summary, Tamara Omalley is a 73 y.o. female who presented to [facility] ED on 7/31/22 with left leg pain. MRI revealed L3-L4 far lateral disc herniation on the left side. She was recommended conservative treatment with medications, injections, but opted to undergo surgical decompression at [facility] with Dr. Dutta.  She continues to have pain, mild weakness and difficulty ambulating.  She did not bring her most recent MRI and is scheduled for repeat MRI in May.  I reviewed the general post operative course for far lateral microdiscectomy.  She has tried steroids, gabapentin and oxycodone without lasting improvement.  I recommended she increase her neuropathic pain  medications, restart physical therapy and obtain an updated MRI which has already been ordered. She would like to retry oral steroids, for which I prescribed her prednisone 20mg daily x 2 weeks.  The patient should continue to follow with her treating surgeon and can follow up with me on a prn basis.  The patient expressed understanding, agrees with the overall plan and will follow up as stated above.  Thank you for giving us the opportunity to care for Tamara Omalley I, [place], am scribing for, and in the presence of, Dr. Hussain MD.    Dr. Ruben LEVY MD, personally performed the services described in this documentation as scribed by Donald Lozano PA-C in my presence, and it is accurate and complete.    I spent 30 minutes on this date of service performing the following activities: obtaining history, performing examination, entering orders, documenting, preparing for visit, obtaining / reviewing records, providing counseling and education, independently reviewing study/studies and communicating results.

## 2023-07-14 ENCOUNTER — OFFICE VISIT (OUTPATIENT)
Dept: NEUROSURGERY | Facility: CLINIC | Age: 73
End: 2023-07-14
Payer: COMMERCIAL

## 2023-07-14 VITALS
OXYGEN SATURATION: 96 % | BODY MASS INDEX: 23.41 KG/M2 | HEIGHT: 62 IN | SYSTOLIC BLOOD PRESSURE: 136 MMHG | HEART RATE: 74 BPM | DIASTOLIC BLOOD PRESSURE: 84 MMHG

## 2023-07-14 DIAGNOSIS — M54.16 LUMBAR RADICULOPATHY: Primary | ICD-10-CM

## 2023-07-14 PROCEDURE — 3008F BODY MASS INDEX DOCD: CPT | Performed by: NEUROLOGICAL SURGERY

## 2023-07-14 PROCEDURE — 99214 OFFICE O/P EST MOD 30 MIN: CPT | Performed by: NEUROLOGICAL SURGERY

## 2023-07-14 ASSESSMENT — PAIN SCALES - GENERAL: PAINLEVEL: 4

## 2023-07-14 NOTE — PROGRESS NOTES
"        [facility]  [address] [place] [address], [address]  [place] [address]  Phone: [phone]  Fax: [phone]        Date: 23    Re: Tamara Omalley  : 1950        Reason for visit:  Pain radiating down leg.    History of Present Illness:   Tamara Omalley is a 73 y.o. female who presents in neurosurgical consultation. She has a past medical history of breast carcinoma, proctalgia fugax, hyperlipidemia, hypertension, osteopenia and anal fissure who presented to [facility] ED on 22 with left lower extremity pain that started the same day. She reported left inferior knee and shin pain as well as left sided lower back pain that radiates down her lateral left leg to just below her knee. X-ray of the lumbar spine and left hip with degenerative joint disease seen but no acute fractures or abnormalities. MRI of the lumbar spine demonstrated multilevel lumbar degenerative changes and small left foraminal disc herniation at L3-L4. Patient's biggest concern of her inferior knee/anterior shin pain was not thought to align with the rest of her radicular pain. However her left radicular pain was thought likely due to her L3 compression. Conservative treatment was recommended in the form of injections, medications, and bedrest.    The patient decided for more aggressive treatment and underwent left L3-4 far lateral discectomy on 11/15/22 Dr. Dutta with [facility].    The patient states immediately after surgery she felt much better, however, after developing \"inflammation\" her pain worsened.  She attempted physical therapy which made he pain worse.  She states she is walking at home and trying exercises at home.  Currently she states her pain is mostly below the knee and in the front. Her pain improves when lying down.  She takes oxycodone to help her do physical activity.    Medical History:  has a past medical history of Breast cancer (CMS/HCC) and Hypertension.    Surgical " History: Left L3/4 far lateral discectomy on 11/15/22.    Family History: family history includes Breast cancer in her biological mother; Osteopenia in her biological father.  Family history non-contributory to neurological condition.    Social History:   Social History     Socioeconomic History   • Marital status:      Spouse name: None   • Number of children: None   • Years of education: None   • Highest education level: None   Tobacco Use   • Smoking status: Never   • Smokeless tobacco: Never   Substance and Sexual Activity   • Alcohol use: Not Currently   • Drug use: Never   • Sexual activity: Defer     Social Determinants of Health     Food Insecurity: No Food Insecurity (7/31/2022)    Hunger Vital Sign    • Worried About Running Out of Food in the Last Year: Never true    • Ran Out of Food in the Last Year: Never true        Allergies:   Allergies   Allergen Reactions   • Anastrozole      Other reaction(s): Arthralgias, Myalgias  Pain in finger joints  Pain in finger joints     • Iodine And Iodide Containing Products Rash     Topical Iodine  Topical Iodine         Medications:   Current Outpatient Medications   Medication Sig Dispense Refill   • aspirin 81 mg enteric coated tablet Takes every other day.     • cholecalciferol, vitamin D3, 400 unit (10 mcg) tablet tablet Take 1 tablet by mouth See admin instr.     • cyclobenzaprine (FLEXERIL) 5 mg tablet Take 1 tablet (5 mg total) by mouth 3 (three) times a day as needed for muscle spasms. 90 tablet 0   • gabapentin (NEURONTIN) 100 mg capsule TAKE 1 CAPSULE BY MOUTH 4 TIMES A DAY     • gabapentin (NEURONTIN) 300 mg capsule TAKE 1 CAPSULE BY MOUTH 4 TIMES A DAY     • ibuprofen (MOTRIN) 600 mg tablet Take 1 tablet (600 mg total) by mouth 4 (four) times a day as needed for mild pain (pain) for up to 4 days. 90 tablet 0   • metoprolol tartrate (LOPRESSOR) 25 mg tablet Take 12.5 mg by mouth.     • oxyCODONE (ROXICODONE) 15 mg immediate release tablet Take 15  "mg by mouth Every 4 hours as needed.     • polyethylene glycol (MIRALAX) 17 gram/dose powder Take 17 g by mouth.     • predniSONE (DELTASONE) 20 mg tablet Take 1 tablet (20 mg total) by mouth daily for 14 days. 14 tablet 0   • rosuvastatin (CRESTOR) 10 mg tablet Take 10 mg by mouth once daily.     • triamterene-hydrochlorothiazide (MAXZIDE-25) 37.5-25 mg per tablet Take 0.5 tablets by mouth once daily.     • valACYclovir (VALTREX) 1 gram tablet TAKE TWO TABLETS BY MOUTH TWICE A DAY AS NEEDED     • zolpidem (AMBIEN) 10 mg tablet TAKE 1 TABLET BY MOUTH DAILY AT BEDTIME AS NEEDED FOR SLEEP.       No current facility-administered medications for this visit.       General physical examination:  The patient is well appearing female, sitting upright in her chair in no acute distress, she appears her stated age.  Her head is atraumatic, normocephalic. Her neck is supple without obvious adenopathy. Oral mucosa is moist. Her peripheral pulses are symmetric and palpable. Extremities without peripheral edema. Her breathing is normal and unlabored.     Vitals:    07/14/23 1001   BP: 136/84   BP Location: Left upper arm   Patient Position: Sitting   Pulse: 74   SpO2: 96%   Height: 1.575 m (5' 2.01\")        Well appearing female in no acute distress.    The patient is awake, alert, oriented x 3, with fluent speech, appropriate attention span, concentration and fund of knowledge.  Remote and recent memory are normal.    Cranial nerve examination reveals full visual fields to confrontation, pupils are equal round reactive to light, extra occular muscles are intact, there is no facial asymmetry and tongue protrudes midline.    On motor examination,    Hip flex      L2, L3 Knee ext    L3, L4 Dorsi  flex    L4, L5 Great toe ext    L5 Plantar flex    S1   L 4+ 4+ PL 5 5 5   R 5 5 5 5 5   There is no dysmetria on finger to nose testing.  Gait is stable.    Sensation in intact and equal to light touch throughout all 4 extremities.    She " has 2+ reflexes throughout, without Wright's sign or ankle clonus.    Her surgical site is well healed.    Data Review:  MRI Lumbar spine without cheyanne 5/8/23:    Ish Johnson MD - 05/08/2023   Formatting of this note might be different from the original.   History: Lumbar radiculopathy.     TECHNIQUE: Lumbar spine MRI without contrast     COMPARISON: Lumbar spine MRI 12/2/2022     FINDINGS:     There is normal alignment of the lumbar spine. Trace anterolisthesis of L5 on S1. There is a rudimentary disc between S1 and S2 vertebrae. The conus terminates at L1 and has normal imaging appearance.     Interval evolution of postsurgical changes of left L3-L4 extraforaminal discectomy with interval resolution of previously seen fluid collection lateral to the left L3-L4 facet. Interval decrease in abnormal signal intensity in left posterior paraspinal muscles. Interval resolution of previously seen subcutaneous fluid collection.     L1-2: No posterior disc abnormality. Mild bilateral facet arthropathy. No spinal canal stenosis or neural foraminal narrowing.     L2-3: No posterior disc abnormality. Moderate facet arthropathy. No spinal canal stenosis or neural foraminal narrowing.     L3-4: Mild disc bulge with superimposed small broad-based left foraminal disc protrusion. There is moderate bilateral facet arthropathy. Mild spinal canal stenosis. No right neural foraminal narrowing. Persistent complete effacement of fat planes in the left neural foramen which is likely partially due to postsurgical changes. The exiting L3 nerve is somewhat prominent in size compared to right side at the level of foramen.     L4-5: Small posterior osteophyte and mild disc bulge with central disc fissure. Moderate bilateral facet arthropathy. Mild spinal canal stenosis and mild left lateral recess narrowing. No bilateral neural foraminal narrowing.     L5-S1: Trace anterolisthesis. Tiny disc bulge. Severe right and moderate left facet  arthropathy. No spinal canal stenosis. Mild to moderate left and no right foraminal narrowing.     IMPRESSION:       Postsurgical changes of left L3-L4 extraforaminal discectomy with interval resolution of immediate postsurgical changes including previous mentioned fluid collections. Persistent complete effacement of the left L3-L4 neural foramen which is likely due to postsurgical changes, residual small broad-based disc protrusion and enlarged L3 nerve root at the level of foramen.      MRI Lumbar spine without cheyanne 12/2/23:  Alexsandra Sanabria MD - 12/02/2022   Formatting of this note might be different from the original.   CLINICAL INFORMATION: 72-year-old. Left L3-L4 extraforaminal discectomy (11/15/2022). Left lower extremity pain, severe with ambulation.     TECHNIQUE: Routine unenhanced MRI of the lumbar spine.   Contrast: None.     COMPARISON: Multiple prior studies, most recent 8/1/2022.     FINDINGS:     Transitional lumbar anatomy with rudimentary intervertebral disc at S1-S2. There are 5 nonrib-bearing lumbar-type vertebrae. For the purposes of this exam, the slice series 10, image 18 corresponds to the L5-S1 level.     Unchanged straightening of the lumbar spine lordosis. Trace new grade 1 anterolisthesis of L5 on S1.   Lumbar vertebrae are normal in height. Degenerative endplate marrow signal changes. Multilevel intervertebral disc height loss, desiccation and Schmorl's nodes, worst at the L5-S1 level. Postsurgical changes from left L3-L4 extraforaminal discectomy with small fluid collection lateral to the L3-L4 facet measuring approximately 0.9 x 1.2 x 1.4 cm (series 8, image 25 and series 7, image 4), edema-like signal in the left posterior paraspinal muscles, and a fluid collection in the left paramedian soft tissue, extending from the T12-L1 to the S1-S2 level, measuring approximately 1.3 x 2.7 x 15.7 cm. These fluid collections, likely reflect combination of seroma and some degraded blood.     L1-2: No  posterior disc abnormality. Mild bilateral facet arthropathy. No spinal canal stenosis or neural foraminal narrowing.     L2-3: No posterior disc abnormality. Moderate facet arthropathy. No spinal canal stenosis or neural foraminal narrowing.     L3-4: Mild disc bulge and trace endplate ridging. Moderate facet arthropathy. Mild spinal canal stenosis. No right neural foraminal narrowing. Complete effacement of fat planes in the left neural foramen is likely postsurgical change without obvious residual disc bulge. New mild T2 hyperintensity of the exiting L3 nerve is likely reactive change.     L4-5: Posterior osteophyte and mild disc bulge with central disc fissure. Moderate bilateral facet arthropathy. Mild spinal canal stenosis and mild left lateral recess narrowing. No bilateral neural foraminal narrowing.     L5-S1: New trace anterolisthesis. Tiny disc bulge. Severe right and moderate left facet arthropathy. No spinal canal stenosis. Mild left, no right foraminal narrowing.     Normal appearance and position of the conus medullaris, which terminates at L1.     IMPRESSION:   1.  Interval left L3-L4 extraforaminal discectomy. Multifocal postsurgical fluid lateral to the left L3-L4 facet and subcutaneous back. Complete effacement of the left L3-L4 neural foramen is likely postsurgical change. New mild T2 hyperintensity of the exiting L3 nerve is probably reactive in etiology.   2.  New trace grade 1 anterolisthesis of L5 on S1.     Images were personally reviewed by myself and with the patient.    Assessment and Plan:  In summary, Tamara Omalley is a 73 y.o. female who presented to [facility] ED on 7/31/22 with left leg pain. MRI revealed L3-L4 far lateral disc herniation on the left side. She was recommended conservative treatment with medications, injections, but opted to undergo surgical decompression at [facility] with Dr. Dutta.  She continues to have pain, mild weakness and difficulty ambulating.  Her  most recent MRI shows some enlargement of her left L3 nerve without ongoing compression.  I recommended she undergo a trial of oral steroids, however the patient does not wish to do so secondary to concerns for weight gain.  We also discussed repeat epidural injection, which she is reluctant to pursue as well.  If all conservative options fail, indirect decompression could be considered.  The patient should continue to follow with her treating surgeon and can follow up with me on a prn basis.  The patient expressed understanding, agrees with the overall plan and will follow up as stated above.  Thank you for giving us the opportunity to care for Tamara Omalley I, PHYLLIS Allen, am scribing for, and in the presence of, Dr. Hussain MD.    This note was transcribed using voice recognition software. Please disregard grammatic and typographical errors and contact my office if questions arise.     I spent 30 minutes on this date of service performing the following activities: obtaining history, performing examination, entering orders, documenting, preparing for visit, obtaining / reviewing records, providing counseling and education, independently reviewing study/studies and communicating results.

## 2023-07-14 NOTE — LETTER
2023     Tova Vargas MD  [address]  [address]  [place] [address]    Patient: Tamara Omalley  YOB: 1950  Date of Visit: 2023      Dear Dr. Vargas:    Thank you for referring Tamara Omalley to me for evaluation. Below are my notes for this consultation.    If you have questions, please do not hesitate to call me. I look forward to following your patient along with you.         Sincerely,        Tawana Nixon MD        CC: Tawana Brown MD  2023  3:07 PM  Signed          [facility]  [address], [address]  [place] [address]  Phone: [phone]  Fax: [phone]        Date: 23    Re: Tamara Omalley  : 1950        Reason for visit:  Pain radiating down leg.    History of Present Illness:   Tamara Omalley is a 73 y.o. female who presents in neurosurgical consultation. She has a past medical history of breast carcinoma, proctalgia fugax, hyperlipidemia, hypertension, osteopenia and anal fissure who presented to [facility] ED on 22 with left lower extremity pain that started the same day. She reported left inferior knee and shin pain as well as left sided lower back pain that radiates down her lateral left leg to just below her knee. X-ray of the lumbar spine and left hip with degenerative joint disease seen but no acute fractures or abnormalities. MRI of the lumbar spine demonstrated multilevel lumbar degenerative changes and small left foraminal disc herniation at L3-L4. Patient's biggest concern of her inferior knee/anterior shin pain was not thought to align with the rest of her radicular pain. However her left radicular pain was thought likely due to her L3 compression. Conservative treatment was recommended in the form of injections, medications, and bedrest.    The patient decided for more aggressive treatment and underwent left L3-4 far lateral discectomy on  "11/15/22 Dr. Dutta with [facility].    The patient states immediately after surgery she felt much better, however, after developing \"inflammation\" her pain worsened.  She attempted physical therapy which made he pain worse.  She states she is walking at home and trying exercises at home.  Currently she states her pain is mostly below the knee and in the front. Her pain improves when lying down.  She takes oxycodone to help her do physical activity.    Medical History:  has a past medical history of Breast cancer (CMS/HCC) and Hypertension.    Surgical History: Left L3/4 far lateral discectomy on 11/15/22.    Family History: family history includes Breast cancer in her biological mother; Osteopenia in her biological father.  Family history non-contributory to neurological condition.    Social History:   Social History     Socioeconomic History   • Marital status:      Spouse name: None   • Number of children: None   • Years of education: None   • Highest education level: None   Tobacco Use   • Smoking status: Never   • Smokeless tobacco: Never   Substance and Sexual Activity   • Alcohol use: Not Currently   • Drug use: Never   • Sexual activity: Defer     Social Determinants of Health     Food Insecurity: No Food Insecurity (7/31/2022)    Hunger Vital Sign    • Worried About Running Out of Food in the Last Year: Never true    • Ran Out of Food in the Last Year: Never true        Allergies:   Allergies   Allergen Reactions   • Anastrozole      Other reaction(s): Arthralgias, Myalgias  Pain in finger joints  Pain in finger joints     • Iodine And Iodide Containing Products Rash     Topical Iodine  Topical Iodine         Medications:   Current Outpatient Medications   Medication Sig Dispense Refill   • aspirin 81 mg enteric coated tablet Takes every other day.     • cholecalciferol, vitamin D3, 400 unit (10 mcg) tablet tablet Take 1 tablet by mouth See admin instr.     • cyclobenzaprine (FLEXERIL) 5 mg tablet " "Take 1 tablet (5 mg total) by mouth 3 (three) times a day as needed for muscle spasms. 90 tablet 0   • gabapentin (NEURONTIN) 100 mg capsule TAKE 1 CAPSULE BY MOUTH 4 TIMES A DAY     • gabapentin (NEURONTIN) 300 mg capsule TAKE 1 CAPSULE BY MOUTH 4 TIMES A DAY     • ibuprofen (MOTRIN) 600 mg tablet Take 1 tablet (600 mg total) by mouth 4 (four) times a day as needed for mild pain (pain) for up to 4 days. 90 tablet 0   • metoprolol tartrate (LOPRESSOR) 25 mg tablet Take 12.5 mg by mouth.     • oxyCODONE (ROXICODONE) 15 mg immediate release tablet Take 15 mg by mouth Every 4 hours as needed.     • polyethylene glycol (MIRALAX) 17 gram/dose powder Take 17 g by mouth.     • predniSONE (DELTASONE) 20 mg tablet Take 1 tablet (20 mg total) by mouth daily for 14 days. 14 tablet 0   • rosuvastatin (CRESTOR) 10 mg tablet Take 10 mg by mouth once daily.     • triamterene-hydrochlorothiazide (MAXZIDE-25) 37.5-25 mg per tablet Take 0.5 tablets by mouth once daily.     • valACYclovir (VALTREX) 1 gram tablet TAKE TWO TABLETS BY MOUTH TWICE A DAY AS NEEDED     • zolpidem (AMBIEN) 10 mg tablet TAKE 1 TABLET BY MOUTH DAILY AT BEDTIME AS NEEDED FOR SLEEP.       No current facility-administered medications for this visit.       General physical examination:  The patient is well appearing female, sitting upright in her chair in no acute distress, she appears her stated age.  Her head is atraumatic, normocephalic. Her neck is supple without obvious adenopathy. Oral mucosa is moist. Her peripheral pulses are symmetric and palpable. Extremities without peripheral edema. Her breathing is normal and unlabored.     Vitals:    07/14/23 1001   BP: 136/84   BP Location: Left upper arm   Patient Position: Sitting   Pulse: 74   SpO2: 96%   Height: 1.575 m (5' 2.01\")        Well appearing female in no acute distress.    The patient is awake, alert, oriented x 3, with fluent speech, appropriate attention span, concentration and fund of knowledge.  " Remote and recent memory are normal.    Cranial nerve examination reveals full visual fields to confrontation, pupils are equal round reactive to light, extra occular muscles are intact, there is no facial asymmetry and tongue protrudes midline.    On motor examination,    Hip flex      L2, L3 Knee ext    L3, L4 Dorsi  flex    L4, L5 Great toe ext    L5 Plantar flex    S1   L 4+ 4+ PL 5 5 5   R 5 5 5 5 5   There is no dysmetria on finger to nose testing.  Gait is stable.    Sensation in intact and equal to light touch throughout all 4 extremities.    She has 2+ reflexes throughout, without Wright's sign or ankle clonus.    Her surgical site is well healed.    Data Review:  MRI Lumbar spine without cheyanne 5/8/23:    Ish Johnson MD - 05/08/2023   Formatting of this note might be different from the original.   History: Lumbar radiculopathy.     TECHNIQUE: Lumbar spine MRI without contrast     COMPARISON: Lumbar spine MRI 12/2/2022     FINDINGS:     There is normal alignment of the lumbar spine. Trace anterolisthesis of L5 on S1. There is a rudimentary disc between S1 and S2 vertebrae. The conus terminates at L1 and has normal imaging appearance.     Interval evolution of postsurgical changes of left L3-L4 extraforaminal discectomy with interval resolution of previously seen fluid collection lateral to the left L3-L4 facet. Interval decrease in abnormal signal intensity in left posterior paraspinal muscles. Interval resolution of previously seen subcutaneous fluid collection.     L1-2: No posterior disc abnormality. Mild bilateral facet arthropathy. No spinal canal stenosis or neural foraminal narrowing.     L2-3: No posterior disc abnormality. Moderate facet arthropathy. No spinal canal stenosis or neural foraminal narrowing.     L3-4: Mild disc bulge with superimposed small broad-based left foraminal disc protrusion. There is moderate bilateral facet arthropathy. Mild spinal canal stenosis. No right neural  foraminal narrowing. Persistent complete effacement of fat planes in the left neural foramen which is likely partially due to postsurgical changes. The exiting L3 nerve is somewhat prominent in size compared to right side at the level of foramen.     L4-5: Small posterior osteophyte and mild disc bulge with central disc fissure. Moderate bilateral facet arthropathy. Mild spinal canal stenosis and mild left lateral recess narrowing. No bilateral neural foraminal narrowing.     L5-S1: Trace anterolisthesis. Tiny disc bulge. Severe right and moderate left facet arthropathy. No spinal canal stenosis. Mild to moderate left and no right foraminal narrowing.     IMPRESSION:       Postsurgical changes of left L3-L4 extraforaminal discectomy with interval resolution of immediate postsurgical changes including previous mentioned fluid collections. Persistent complete effacement of the left L3-L4 neural foramen which is likely due to postsurgical changes, residual small broad-based disc protrusion and enlarged L3 nerve root at the level of foramen.      MRI Lumbar spine without cheyanne 12/2/23:  Alexsandra Sanabria MD - 12/02/2022   Formatting of this note might be different from the original.   CLINICAL INFORMATION: 72-year-old. Left L3-L4 extraforaminal discectomy (11/15/2022). Left lower extremity pain, severe with ambulation.     TECHNIQUE: Routine unenhanced MRI of the lumbar spine.   Contrast: None.     COMPARISON: Multiple prior studies, most recent 8/1/2022.     FINDINGS:     Transitional lumbar anatomy with rudimentary intervertebral disc at S1-S2. There are 5 nonrib-bearing lumbar-type vertebrae. For the purposes of this exam, the slice series 10, image 18 corresponds to the L5-S1 level.     Unchanged straightening of the lumbar spine lordosis. Trace new grade 1 anterolisthesis of L5 on S1.   Lumbar vertebrae are normal in height. Degenerative endplate marrow signal changes. Multilevel intervertebral disc height loss,  desiccation and Schmorl's nodes, worst at the L5-S1 level. Postsurgical changes from left L3-L4 extraforaminal discectomy with small fluid collection lateral to the L3-L4 facet measuring approximately 0.9 x 1.2 x 1.4 cm (series 8, image 25 and series 7, image 4), edema-like signal in the left posterior paraspinal muscles, and a fluid collection in the left paramedian soft tissue, extending from the T12-L1 to the S1-S2 level, measuring approximately 1.3 x 2.7 x 15.7 cm. These fluid collections, likely reflect combination of seroma and some degraded blood.     L1-2: No posterior disc abnormality. Mild bilateral facet arthropathy. No spinal canal stenosis or neural foraminal narrowing.     L2-3: No posterior disc abnormality. Moderate facet arthropathy. No spinal canal stenosis or neural foraminal narrowing.     L3-4: Mild disc bulge and trace endplate ridging. Moderate facet arthropathy. Mild spinal canal stenosis. No right neural foraminal narrowing. Complete effacement of fat planes in the left neural foramen is likely postsurgical change without obvious residual disc bulge. New mild T2 hyperintensity of the exiting L3 nerve is likely reactive change.     L4-5: Posterior osteophyte and mild disc bulge with central disc fissure. Moderate bilateral facet arthropathy. Mild spinal canal stenosis and mild left lateral recess narrowing. No bilateral neural foraminal narrowing.     L5-S1: New trace anterolisthesis. Tiny disc bulge. Severe right and moderate left facet arthropathy. No spinal canal stenosis. Mild left, no right foraminal narrowing.     Normal appearance and position of the conus medullaris, which terminates at L1.     IMPRESSION:   1.  Interval left L3-L4 extraforaminal discectomy. Multifocal postsurgical fluid lateral to the left L3-L4 facet and subcutaneous back. Complete effacement of the left L3-L4 neural foramen is likely postsurgical change. New mild T2 hyperintensity of the exiting L3 nerve is  probably reactive in etiology.   2.  New trace grade 1 anterolisthesis of L5 on S1.     Images were personally reviewed by myself and with the patient.    Assessment and Plan:  In summary, Tamara Omalley is a 73 y.o. female who presented to [facility] ED on 7/31/22 with left leg pain. MRI revealed L3-L4 far lateral disc herniation on the left side. She was recommended conservative treatment with medications, injections, but opted to undergo surgical decompression at [facility] with Dr. Dutta.  She continues to have pain, mild weakness and difficulty ambulating.  Her most recent MRI shows some enlargement of her left L3 nerve without ongoing compression.  I recommended she undergo a trial of oral steroids, however the patient does not wish to do so secondary to concerns for weight gain.  We also discussed repeat epidural injection, which she is reluctant to pursue as well.  If all conservative options fail, indirect decompression could be considered.  The patient should continue to follow with her treating surgeon and can follow up with me on a prn basis.  The patient expressed understanding, agrees with the overall plan and will follow up as stated above.  Thank you for giving us the opportunity to care for Tamara Omalley    I, [place], am scribing for, and in the presence of, Dr. Hussain MD.    This note was transcribed using voice recognition software. Please disregard grammatic and typographical errors and contact my office if questions arise.     I spent 30 minutes on this date of service performing the following activities: obtaining history, performing examination, entering orders, documenting, preparing for visit, obtaining / reviewing records, providing counseling and education, independently reviewing study/studies and communicating results.

## 2023-12-01 ENCOUNTER — APPOINTMENT (EMERGENCY)
Dept: RADIOLOGY | Facility: HOSPITAL | Age: 73
End: 2023-12-01
Payer: COMMERCIAL

## 2023-12-01 ENCOUNTER — HOSPITAL ENCOUNTER (OUTPATIENT)
Facility: HOSPITAL | Age: 73
Setting detail: OBSERVATION
Discharge: HOME | End: 2023-12-02
Attending: STUDENT IN AN ORGANIZED HEALTH CARE EDUCATION/TRAINING PROGRAM | Admitting: HOSPITALIST
Payer: COMMERCIAL

## 2023-12-01 DIAGNOSIS — H53.8 BLURRED VISION: Primary | ICD-10-CM

## 2023-12-01 LAB
ALBUMIN SERPL-MCNC: 4.3 G/DL (ref 3.5–5.7)
ALP SERPL-CCNC: 31 IU/L (ref 34–125)
ALT SERPL-CCNC: 13 IU/L (ref 7–52)
ANION GAP SERPL CALC-SCNC: 7 MEQ/L (ref 3–15)
APTT PPP: 30 SEC (ref 23–35)
AST SERPL-CCNC: 37 IU/L (ref 13–39)
BASOPHILS # BLD: 0.07 K/UL (ref 0.01–0.1)
BASOPHILS NFR BLD: 0.7 %
BILIRUB SERPL-MCNC: 0.6 MG/DL (ref 0.3–1.2)
BUN SERPL-MCNC: 19 MG/DL (ref 7–25)
CALCIUM SERPL-MCNC: 9.4 MG/DL (ref 8.6–10.3)
CHLORIDE SERPL-SCNC: 104 MEQ/L (ref 98–107)
CO2 SERPL-SCNC: 26 MEQ/L (ref 21–31)
CREAT SERPL-MCNC: 0.9 MG/DL (ref 0.6–1.2)
CRP SERPL-MCNC: 1 MG/L
DIFFERENTIAL METHOD BLD: ABNORMAL
EOSINOPHIL # BLD: 0.1 K/UL (ref 0.04–0.36)
EOSINOPHIL NFR BLD: 0.9 %
ERYTHROCYTE [DISTWIDTH] IN BLOOD BY AUTOMATED COUNT: 13.7 % (ref 11.7–14.4)
GFR SERPL CREATININE-BSD FRML MDRD: >60 ML/MIN/1.73M*2
GLUCOSE SERPL-MCNC: 115 MG/DL (ref 70–99)
HCT VFR BLDCO AUTO: 41.6 % (ref 35–45)
HGB BLD-MCNC: 13.9 G/DL (ref 11.8–15.7)
IMM GRANULOCYTES # BLD AUTO: 0.08 K/UL (ref 0–0.08)
IMM GRANULOCYTES NFR BLD AUTO: 0.7 %
INR PPP: 1
LYMPHOCYTES # BLD: 2.74 K/UL (ref 1.2–3.5)
LYMPHOCYTES NFR BLD: 25.6 %
MCH RBC QN AUTO: 29.9 PG (ref 28–33.2)
MCHC RBC AUTO-ENTMCNC: 33.4 G/DL (ref 32.2–35.5)
MCV RBC AUTO: 89.5 FL (ref 83–98)
MONOCYTES # BLD: 0.9 K/UL (ref 0.28–0.8)
MONOCYTES NFR BLD: 8.4 %
NEUTROPHILS # BLD: 6.81 K/UL (ref 1.7–7)
NEUTS SEG NFR BLD: 63.7 %
NRBC BLD-RTO: 0 %
PDW BLD AUTO: 12.2 FL (ref 9.4–12.3)
PLATELET # BLD AUTO: 217 K/UL (ref 150–369)
POTASSIUM SERPL-SCNC: 4.4 MEQ/L (ref 3.5–5.1)
PROT SERPL-MCNC: 7.1 G/DL (ref 6–8.2)
PROTHROMBIN TIME: 12.9 SEC (ref 12.2–14.5)
RBC # BLD AUTO: 4.65 M/UL (ref 3.93–5.22)
SODIUM SERPL-SCNC: 137 MEQ/L (ref 136–145)
TROPONIN I SERPL HS-MCNC: 6.8 PG/ML
WBC # BLD AUTO: 10.7 K/UL (ref 3.8–10.5)

## 2023-12-01 PROCEDURE — 85652 RBC SED RATE AUTOMATED: CPT

## 2023-12-01 PROCEDURE — 86140 C-REACTIVE PROTEIN: CPT

## 2023-12-01 PROCEDURE — 80053 COMPREHEN METABOLIC PANEL: CPT

## 2023-12-01 PROCEDURE — 80053 COMPREHEN METABOLIC PANEL: CPT | Performed by: STUDENT IN AN ORGANIZED HEALTH CARE EDUCATION/TRAINING PROGRAM

## 2023-12-01 PROCEDURE — 70498 CT ANGIOGRAPHY NECK: CPT | Mod: ME

## 2023-12-01 PROCEDURE — 63600105 HC IODINE BASED CONTRAST: Mod: JZ

## 2023-12-01 PROCEDURE — 85730 THROMBOPLASTIN TIME PARTIAL: CPT | Performed by: STUDENT IN AN ORGANIZED HEALTH CARE EDUCATION/TRAINING PROGRAM

## 2023-12-01 PROCEDURE — 63600000 HC DRUGS/DETAIL CODE: Mod: JZ | Performed by: STUDENT IN AN ORGANIZED HEALTH CARE EDUCATION/TRAINING PROGRAM

## 2023-12-01 PROCEDURE — 85610 PROTHROMBIN TIME: CPT | Performed by: STUDENT IN AN ORGANIZED HEALTH CARE EDUCATION/TRAINING PROGRAM

## 2023-12-01 PROCEDURE — 85025 COMPLETE CBC W/AUTO DIFF WBC: CPT | Performed by: STUDENT IN AN ORGANIZED HEALTH CARE EDUCATION/TRAINING PROGRAM

## 2023-12-01 PROCEDURE — 84484 ASSAY OF TROPONIN QUANT: CPT | Performed by: STUDENT IN AN ORGANIZED HEALTH CARE EDUCATION/TRAINING PROGRAM

## 2023-12-01 PROCEDURE — 93005 ELECTROCARDIOGRAM TRACING: CPT | Performed by: STUDENT IN AN ORGANIZED HEALTH CARE EDUCATION/TRAINING PROGRAM

## 2023-12-01 PROCEDURE — 96374 THER/PROPH/DIAG INJ IV PUSH: CPT

## 2023-12-01 PROCEDURE — 36415 COLL VENOUS BLD VENIPUNCTURE: CPT

## 2023-12-01 PROCEDURE — 99285 EMERGENCY DEPT VISIT HI MDM: CPT | Mod: 25

## 2023-12-01 RX ORDER — IOPAMIDOL 755 MG/ML
100 INJECTION, SOLUTION INTRAVASCULAR
Status: COMPLETED | OUTPATIENT
Start: 2023-12-01 | End: 2023-12-01

## 2023-12-01 RX ORDER — HYDRALAZINE HYDROCHLORIDE 20 MG/ML
10 INJECTION INTRAMUSCULAR; INTRAVENOUS ONCE
Status: COMPLETED | OUTPATIENT
Start: 2023-12-01 | End: 2023-12-01

## 2023-12-01 RX ADMIN — HYDRALAZINE HYDROCHLORIDE 10 MG: 20 INJECTION INTRAMUSCULAR; INTRAVENOUS at 23:22

## 2023-12-01 RX ADMIN — IOPAMIDOL 100 ML: 755 INJECTION, SOLUTION INTRAVENOUS at 23:46

## 2023-12-02 ENCOUNTER — APPOINTMENT (OUTPATIENT)
Dept: RADIOLOGY | Facility: HOSPITAL | Age: 73
Setting detail: OBSERVATION
End: 2023-12-02
Attending: HOSPITALIST
Payer: COMMERCIAL

## 2023-12-02 VITALS
TEMPERATURE: 98.5 F | RESPIRATION RATE: 16 BRPM | HEART RATE: 82 BPM | OXYGEN SATURATION: 96 % | DIASTOLIC BLOOD PRESSURE: 63 MMHG | WEIGHT: 135 LBS | SYSTOLIC BLOOD PRESSURE: 137 MMHG | BODY MASS INDEX: 24.69 KG/M2

## 2023-12-02 PROBLEM — G45.9 TIA (TRANSIENT ISCHEMIC ATTACK): Status: RESOLVED | Noted: 2023-12-02 | Resolved: 2023-12-02

## 2023-12-02 PROBLEM — G45.9 TIA (TRANSIENT ISCHEMIC ATTACK): Status: ACTIVE | Noted: 2023-12-02

## 2023-12-02 PROBLEM — H53.9 VISION CHANGES: Status: ACTIVE | Noted: 2023-12-02

## 2023-12-02 PROBLEM — E78.49 OTHER HYPERLIPIDEMIA: Status: ACTIVE | Noted: 2023-12-02

## 2023-12-02 PROBLEM — G89.29 CHRONIC PAIN: Status: ACTIVE | Noted: 2023-12-02

## 2023-12-02 LAB
ANION GAP SERPL CALC-SCNC: 8 MEQ/L (ref 3–15)
ATRIAL RATE: 60
BILIRUB UR QL STRIP.AUTO: NEGATIVE MG/DL
BUN SERPL-MCNC: 15 MG/DL (ref 7–25)
CALCIUM SERPL-MCNC: 9.3 MG/DL (ref 8.6–10.3)
CHLORIDE SERPL-SCNC: 108 MEQ/L (ref 98–107)
CHOLEST SERPL-MCNC: 158 MG/DL
CLARITY UR REFRACT.AUTO: CLEAR
CO2 SERPL-SCNC: 25 MEQ/L (ref 21–31)
COLOR UR AUTO: COLORLESS
CREAT SERPL-MCNC: 0.8 MG/DL (ref 0.6–1.2)
ERYTHROCYTE [DISTWIDTH] IN BLOOD BY AUTOMATED COUNT: 13.8 % (ref 11.7–14.4)
ERYTHROCYTE [SEDIMENTATION RATE] IN BLOOD BY WESTERGREN METHOD: 11 MM/HR
EST. AVERAGE GLUCOSE BLD GHB EST-MCNC: 111 MG/DL
GFR SERPL CREATININE-BSD FRML MDRD: >60 ML/MIN/1.73M*2
GLUCOSE SERPL-MCNC: 96 MG/DL (ref 70–99)
GLUCOSE UR STRIP.AUTO-MCNC: NEGATIVE MG/DL
HBA1C MFR BLD: 5.5 %
HCT VFR BLDCO AUTO: 39.6 % (ref 35–45)
HDLC SERPL-MCNC: 81 MG/DL
HDLC SERPL: 2 {RATIO}
HGB BLD-MCNC: 13.3 G/DL (ref 11.8–15.7)
HGB UR QL STRIP.AUTO: NEGATIVE
KETONES UR STRIP.AUTO-MCNC: NEGATIVE MG/DL
LDLC SERPL CALC-MCNC: 60 MG/DL
LEUKOCYTE ESTERASE UR QL STRIP.AUTO: NEGATIVE
MCH RBC QN AUTO: 30 PG (ref 28–33.2)
MCHC RBC AUTO-ENTMCNC: 33.6 G/DL (ref 32.2–35.5)
MCV RBC AUTO: 89.2 FL (ref 83–98)
NITRITE UR QL STRIP.AUTO: NEGATIVE
NONHDLC SERPL-MCNC: 77 MG/DL
P AXIS: 54
PDW BLD AUTO: 12.5 FL (ref 9.4–12.3)
PH UR STRIP.AUTO: 7.5 [PH]
PLATELET # BLD AUTO: 199 K/UL (ref 150–369)
POTASSIUM SERPL-SCNC: 3.4 MEQ/L (ref 3.5–5.1)
PR INTERVAL: 162
PROT UR QL STRIP.AUTO: NEGATIVE
QRS DURATION: 88
QT INTERVAL: 420
QTC CALCULATION(BAZETT): 420
R AXIS: 13
RBC # BLD AUTO: 4.44 M/UL (ref 3.93–5.22)
SODIUM SERPL-SCNC: 141 MEQ/L (ref 136–145)
SP GR UR REFRACT.AUTO: 1.01
T WAVE AXIS: 50
TRIGL SERPL-MCNC: 84 MG/DL
TROPONIN I SERPL HS-MCNC: 5.9 PG/ML
TROPONIN I SERPL HS-MCNC: 8.4 PG/ML
UROBILINOGEN UR STRIP-ACNC: 0.2 EU/DL
VENTRICULAR RATE: 60
WBC # BLD AUTO: 8.67 K/UL (ref 3.8–10.5)

## 2023-12-02 PROCEDURE — 70551 MRI BRAIN STEM W/O DYE: CPT | Mod: MG

## 2023-12-02 PROCEDURE — G0378 HOSPITAL OBSERVATION PER HR: HCPCS

## 2023-12-02 PROCEDURE — 83036 HEMOGLOBIN GLYCOSYLATED A1C: CPT | Performed by: HOSPITALIST

## 2023-12-02 PROCEDURE — 93010 ELECTROCARDIOGRAM REPORT: CPT | Performed by: INTERNAL MEDICINE

## 2023-12-02 PROCEDURE — 81003 URINALYSIS AUTO W/O SCOPE: CPT | Performed by: HOSPITALIST

## 2023-12-02 PROCEDURE — 84484 ASSAY OF TROPONIN QUANT: CPT | Mod: 91 | Performed by: HOSPITALIST

## 2023-12-02 PROCEDURE — 36415 COLL VENOUS BLD VENIPUNCTURE: CPT | Performed by: STUDENT IN AN ORGANIZED HEALTH CARE EDUCATION/TRAINING PROGRAM

## 2023-12-02 PROCEDURE — 80048 BASIC METABOLIC PNL TOTAL CA: CPT | Performed by: HOSPITALIST

## 2023-12-02 PROCEDURE — 80061 LIPID PANEL: CPT | Performed by: HOSPITALIST

## 2023-12-02 PROCEDURE — 99223 1ST HOSP IP/OBS HIGH 75: CPT | Performed by: STUDENT IN AN ORGANIZED HEALTH CARE EDUCATION/TRAINING PROGRAM

## 2023-12-02 PROCEDURE — 63700000 HC SELF-ADMINISTRABLE DRUG: Performed by: HOSPITALIST

## 2023-12-02 PROCEDURE — 85027 COMPLETE CBC AUTOMATED: CPT | Performed by: HOSPITALIST

## 2023-12-02 PROCEDURE — 63700000 HC SELF-ADMINISTRABLE DRUG

## 2023-12-02 PROCEDURE — 99236 HOSP IP/OBS SAME DATE HI 85: CPT | Performed by: HOSPITALIST

## 2023-12-02 PROCEDURE — 99999 PR OFFICE/OUTPT VISIT,PROCEDURE ONLY: CPT | Performed by: HOSPITALIST

## 2023-12-02 PROCEDURE — 84484 ASSAY OF TROPONIN QUANT: CPT | Performed by: STUDENT IN AN ORGANIZED HEALTH CARE EDUCATION/TRAINING PROGRAM

## 2023-12-02 RX ORDER — OXYCODONE HYDROCHLORIDE 5 MG/1
10 TABLET ORAL 2 TIMES DAILY PRN
Status: DISCONTINUED | OUTPATIENT
Start: 2023-12-02 | End: 2023-12-02 | Stop reason: HOSPADM

## 2023-12-02 RX ORDER — GABAPENTIN 100 MG/1
100 CAPSULE ORAL NIGHTLY
COMMUNITY

## 2023-12-02 RX ORDER — TRIAMTERENE/HYDROCHLOROTHIAZID 37.5-25 MG
1 TABLET ORAL
Status: DISCONTINUED | OUTPATIENT
Start: 2023-12-02 | End: 2023-12-02 | Stop reason: HOSPADM

## 2023-12-02 RX ORDER — POLYETHYLENE GLYCOL 3350 17 G/17G
17 POWDER, FOR SOLUTION ORAL DAILY
Status: DISCONTINUED | OUTPATIENT
Start: 2023-12-02 | End: 2023-12-02 | Stop reason: HOSPADM

## 2023-12-02 RX ORDER — GABAPENTIN 100 MG/1
100 CAPSULE ORAL NIGHTLY
Status: DISCONTINUED | OUTPATIENT
Start: 2023-12-02 | End: 2023-12-02 | Stop reason: HOSPADM

## 2023-12-02 RX ORDER — POTASSIUM CHLORIDE 750 MG/1
40 TABLET, EXTENDED RELEASE ORAL ONCE
Status: COMPLETED | OUTPATIENT
Start: 2023-12-02 | End: 2023-12-02

## 2023-12-02 RX ORDER — NAPROXEN SODIUM 220 MG/1
81 TABLET, FILM COATED ORAL DAILY
Status: DISCONTINUED | OUTPATIENT
Start: 2023-12-02 | End: 2023-12-02 | Stop reason: HOSPADM

## 2023-12-02 RX ORDER — CYANOCOBALAMIN (VITAMIN B-12) 500 MCG
400 TABLET ORAL DAILY
Status: DISCONTINUED | OUTPATIENT
Start: 2023-12-02 | End: 2023-12-02 | Stop reason: HOSPADM

## 2023-12-02 RX ORDER — ACETAMINOPHEN 650 MG/20.3ML
650 LIQUID ORAL EVERY 4 HOURS PRN
Status: DISCONTINUED | OUTPATIENT
Start: 2023-12-02 | End: 2023-12-02 | Stop reason: HOSPADM

## 2023-12-02 RX ORDER — GABAPENTIN 300 MG/1
300 CAPSULE ORAL EVERY MORNING
Status: DISCONTINUED | OUTPATIENT
Start: 2023-12-02 | End: 2023-12-02 | Stop reason: HOSPADM

## 2023-12-02 RX ORDER — ACETAMINOPHEN 650 MG/1
650 SUPPOSITORY RECTAL EVERY 4 HOURS PRN
Status: DISCONTINUED | OUTPATIENT
Start: 2023-12-02 | End: 2023-12-02 | Stop reason: HOSPADM

## 2023-12-02 RX ORDER — ROSUVASTATIN CALCIUM 10 MG/1
10 TABLET, COATED ORAL
Status: DISCONTINUED | OUTPATIENT
Start: 2023-12-02 | End: 2023-12-02 | Stop reason: HOSPADM

## 2023-12-02 RX ORDER — METOPROLOL TARTRATE 25 MG/1
25 TABLET, FILM COATED ORAL AS NEEDED
Status: DISCONTINUED | OUTPATIENT
Start: 2023-12-02 | End: 2023-12-02

## 2023-12-02 RX ORDER — METOPROLOL TARTRATE 25 MG/1
25 TABLET, FILM COATED ORAL DAILY PRN
Status: DISCONTINUED | OUTPATIENT
Start: 2023-12-02 | End: 2023-12-02 | Stop reason: HOSPADM

## 2023-12-02 RX ORDER — IBUPROFEN 200 MG
16-32 TABLET ORAL AS NEEDED
Status: DISCONTINUED | OUTPATIENT
Start: 2023-12-02 | End: 2023-12-02 | Stop reason: HOSPADM

## 2023-12-02 RX ORDER — DEXTROSE 50 % IN WATER (D50W) INTRAVENOUS SYRINGE
25 AS NEEDED
Status: DISCONTINUED | OUTPATIENT
Start: 2023-12-02 | End: 2023-12-02 | Stop reason: HOSPADM

## 2023-12-02 RX ORDER — ASPIRIN 325 MG
325 TABLET ORAL ONCE
Status: COMPLETED | OUTPATIENT
Start: 2023-12-02 | End: 2023-12-02

## 2023-12-02 RX ORDER — ACETAMINOPHEN 325 MG/1
650 TABLET ORAL EVERY 4 HOURS PRN
Status: DISCONTINUED | OUTPATIENT
Start: 2023-12-02 | End: 2023-12-02 | Stop reason: HOSPADM

## 2023-12-02 RX ORDER — DEXTROSE 40 %
15-30 GEL (GRAM) ORAL AS NEEDED
Status: DISCONTINUED | OUTPATIENT
Start: 2023-12-02 | End: 2023-12-02 | Stop reason: HOSPADM

## 2023-12-02 RX ADMIN — OXYCODONE HYDROCHLORIDE 10 MG: 5 TABLET ORAL at 05:12

## 2023-12-02 RX ADMIN — GABAPENTIN 300 MG: 300 CAPSULE ORAL at 09:01

## 2023-12-02 RX ADMIN — ROSUVASTATIN CALCIUM 10 MG: 10 TABLET, FILM COATED ORAL at 07:52

## 2023-12-02 RX ADMIN — POTASSIUM CHLORIDE 40 MEQ: 750 TABLET, EXTENDED RELEASE ORAL at 07:52

## 2023-12-02 RX ADMIN — ASPIRIN 325 MG: 325 TABLET ORAL at 04:18

## 2023-12-02 RX ADMIN — CHOLECALCIFEROL TAB 10 MCG (400 UNIT) 400 UNITS: 10 TAB at 09:01

## 2023-12-02 ASSESSMENT — TONOMETRY
OD_IOP_MMHG: 17
OS_IOP_MMHG: 15
IOP_AUTOMATED: 1

## 2023-12-02 ASSESSMENT — ENCOUNTER SYMPTOMS
FEVER: 0
HEADACHES: 0
ABDOMINAL PAIN: 0
FREQUENCY: 0
TREMORS: 0
PHOTOPHOBIA: 0
CHILLS: 0
DIZZINESS: 0
BACK PAIN: 0
AGITATION: 0
FACIAL ASYMMETRY: 0
SPEECH DIFFICULTY: 0
WOUND: 0
VOMITING: 0
NECK PAIN: 0
NUMBNESS: 0
SEIZURES: 0
PALPITATIONS: 0
DYSURIA: 0
WEAKNESS: 0
DIARRHEA: 0
NAUSEA: 0
CHEST TIGHTNESS: 0
SORE THROAT: 0
SHORTNESS OF BREATH: 0
LIGHT-HEADEDNESS: 0

## 2023-12-02 NOTE — DISCHARGE SUMMARY
Hospital Medicine Service -  Inpatient Discharge Summary        BRIEF OVERVIEW   Patient: Tamara Omalley (1950)    Admitting Provider: Capri Hankins DO  Attending Provider: Faustino Dias DO;Epifanio* Attending phys phone: N/A    PCP: Tova Vargas [phone]    Admission Date: 12/1/2023  Discharge Date: 12/2/2023     DISCHARGE DIAGNOSES      Primary Discharge Diagnosis  TIA (transient ischemic attack)    Secondary Discharge Diagnoses  Active Hospital Problems    Diagnosis Date Noted   • Other hyperlipidemia 12/02/2023   • Chronic pain 12/02/2023   • Vision changes 12/02/2023   • Left sided sciatica 08/02/2022   • Hypertension 08/01/2022      Resolved Hospital Problems    Diagnosis Date Noted Date Resolved   • TIA (transient ischemic attack) 12/02/2023 12/02/2023       Problem List on Day of Discharge  Vision changes  Assessment & Plan  symptoms resolved  Negative work up for stroke  Neuro evaluated  Outpatient ophtho follow up      Chronic pain  Assessment & Plan  Will continue with patient's oxycodone since she takes it regularly  Continue with gabapentin per patient she takes about 300 mg in the morning and 100 mg at night    Other hyperlipidemia  Assessment & Plan  Continue with statin    Hypertension  Assessment & Plan  RESUME BP meds      SUMMARY OF HOSPITALIZATION      Presenting Problem/History of Present Illness  FROM [facility]  Tamara Omalley is a 73 y.o. female with a past medical history of chronic back pain, breast cancer status postlumpectomy, hypertension who presents with blurry vision.  Is difficult to get a straightforward history from patient it took some time.  Patient states that she developed blurry vision suddenly this evening around 730p.  She was trying to read something and noticed it was blurry.  She turned on the television and the captions look blurry even if she tried to enlarge them.  She went on her phone to look up whether she needed to go to the hospital and her  vision became even more blurry.  She states she checked her blood pressure at home and it was elevated so she took her as needed metoprolol and took an extra Maxide.  She states that the left eye blurry vision seems to have resolved now but the right eye is slightly present.  She is not sure if it is quite still there.  She denies any weakness anywhere.  She states that she has been using a wheelchair for longer walks since her back surgery due to chronic pain but she is able to put all extremities antigravity.  Patient spent a significant mount of time complaining about the IV in her right arm secondary to the pain that it was causing.  She stated she had difficulty moving it because it was just so painful.  She denied any confusion, slurred speech, facial droop, other weakness in her extremities prior to the IV being placed, sensation loss.  She stated when the symptoms happened her  was in the other room.  She tried calling for him when she decided that she needed to go to the ER but she states he did not answer right away so she does not for sure know that there is no other symptoms that were noticeable but she does not believe so.     In ER blood pressure was elevated.  ER gave 10 mg of IV hydralazine.  NIH was found to be a 1.  They did CT head which did not show any acute stroke they spoke with neurology who recommends CTA head and neck and MRI in the morning.  CTA was negative for any acute occlusion.  Symptoms continue to improve and vision changes/blurriness and almost resolved upon them calling for admission.     Exam on Day of Discharge  Physical Exam  GENERAL APPEARANCE  Awake, alert    MUCUS MEMBRANES  Moist    LUNGS  CTAB, no w/r/r    CHEST  RRR, no m/g/r    ABDOMEN  Soft, NT, ND, +BS    EXTREMITIES  No c/c/e    SKIN  No obvious rash    HEENT  Anicteric    NEURO  Moving all extremities, Ox3, coherent, no dysarthria        Consults During Admission  IP CONSULT TO NEUROLOGY    DISCHARGE  MEDICATIONS          Medication List      CONTINUE taking these medications    aspirin 81 mg enteric coated tablet  Takes every other day.     cholecalciferol (vitamin D3) 400 unit (10 mcg) tablet tablet  Take 1 tablet by mouth See admin instr.  Dose: 1 tablet     * gabapentin 100 mg capsule  Commonly known as: NEURONTIN  Take 100 mg by mouth nightly.  Dose: 100 mg     * gabapentin 300 mg capsule  Commonly known as: NEURONTIN  Take 300 mg by mouth every morning.  Dose: 300 mg     metoprolol tartrate 25 mg tablet  Commonly known as: LOPRESSOR  Take 25 mg by mouth as needed (Blood pressure >140).  Dose: 25 mg     oxyCODONE 5 mg immediate release tablet  Commonly known as: ROXICODONE  Take 10 mg by mouth 2 (two) times a day and an additional dose as needed for moderate pain or severe pain.  Dose: 10 mg     polyethylene glycol 17 gram/dose powder  Commonly known as: MIRALAX  Take 17 g by mouth.  Dose: 17 g     rosuvastatin 10 mg tablet  Commonly known as: CRESTOR  Take 10 mg by mouth once daily.  Dose: 10 mg     triamterene-hydrochlorothiazide 37.5-25 mg per tablet  Commonly known as: MAXZIDE-25  Take 1 tablet by mouth once daily.  Dose: 1 tablet     zolpidem 10 mg tablet  Commonly known as: AMBIEN  TAKE 1 TABLET BY MOUTH DAILY AT BEDTIME AS NEEDED FOR SLEEP.         * This list has 2 medication(s) that are the same as other medications prescribed for you. Read the directions carefully, and ask your doctor or other care provider to review them with you.                  PROCEDURES / LABS / IMAGING      Operative Procedures  n/a    Pertinent Labs  Results from last 7 days   Lab Units 12/02/23  0618 12/01/23  2305   WBC K/uL 8.67 10.70*   HEMOGLOBIN g/dL 13.3 13.9   HEMATOCRIT % 39.6 41.6   PLATELETS K/uL 199 217    Results from last 7 days   Lab Units 12/02/23  0618 12/01/23  2116   SODIUM mEQ/L 141 137   POTASSIUM mEQ/L 3.4* 4.4   CHLORIDE mEQ/L 108* 104   CO2 mEQ/L 25 26   BUN mg/dL 15 19   CREATININE mg/dL 0.8 0.9    GLUCOSE mg/dL 96 115*            Results from last 7 days   Lab Units 12/01/23  2116   CRP mg/L 1.00    Results from last 7 days   Lab Units 12/01/23  2305   SED RATE mm/hr 11          Results from last 7 days   Lab Units 12/01/23  2116   AST IU/L 37   ALT IU/L 13   ALK PHOS IU/L 31*   BILIRUBIN TOTAL mg/dL 0.6   PROTEIN TOTAL g/dL 7.1   ALBUMIN g/dL 4.3    Results from last 7 days   Lab Units 12/01/23  2305   PROTIME sec 12.9   PTT sec 30   INR  1.0                Microbiology Results     ** No results found for the last 720 hours. **         Results from last 7 days   Lab Units 12/02/23  0618   CHOLESTEROL mg/dL 158   TRIGLYCERIDES mg/dL 84   HDL mg/dL 81   LDL CALC mg/dL 60           Pertinent Imaging  MRI BRAIN WITHOUT CONTRAST    Result Date: 12/2/2023  IMPRESSION:No evidence of acute infarction or other acute intracranial pathology.    CT ANGIOGRAPHY HEAD/NECK WITH AND WITHOUT IV CONTRAST    Result Date: 12/2/2023  IMPRESSION: 1. No evidence of acute intracranial hemorrhage or other acute intracranial pathology. 2. There is no measurable carotid or vertebral artery stenosis throughout the neck. 3. No evidence of intracranial large vessel occlusion. Within the limits of resolution is technique no intracranial aneurysms, vascular malformations or high-grade intracranial arterial stenoses are demonstrated.       OUTPATIENT  FOLLOW-UP / REFERRALS / PENDING TESTS        Outpatient Follow-Up Appointments  Encounter Information    This patient does not currently have any appointments scheduled.         Referrals  No orders of the defined types were placed in this encounter.      Test Results Pending at Discharge  Unresulted Labs (From admission, onward)    None          DISCHARGE DISPOSITION AND DESTINATION      Disposition: Home   Destination:  Home                            Code Status At Discharge: Full Code    Physician Order for Life-Sustaining Treatment Document Status      No documents found

## 2023-12-02 NOTE — ASSESSMENT & PLAN NOTE
Came in for bilateral blurry vision now seemingly having some mild or blurry vision in the right eye but may be resolved  NIH of possibly 1  CT head and CTA head and neck negative for any acute occlusion or bleed  ER discussed with neurology who wanted patient admitted for MRI  Aspirin  Continue statin  Neurochecks  Telemetry observation  Explained to patient we cannot give her her nightly Ambien

## 2023-12-02 NOTE — H&P
Hospital Medicine Service -  History & Physical        CHIEF COMPLAINT   Blurry vision     HISTORY OF PRESENT ILLNESS      Tamara Omalley is a 73 y.o. female with a past medical history of chronic back pain, breast cancer status postlumpectomy, hypertension who presents with blurry vision.  Is difficult to get a straightforward history from patient it took some time.  Patient states that she developed blurry vision suddenly this evening around 730p.  She was trying to read something and noticed it was blurry.  She turned on the television and the captions look blurry even if she tried to enlarge them.  She went on her phone to look up whether she needed to go to the hospital and her vision became even more blurry.  She states she checked her blood pressure at home and it was elevated so she took her as needed metoprolol and took an extra Maxide.  She states that the left eye blurry vision seems to have resolved now but the right eye is slightly present.  She is not sure if it is quite still there.  She denies any weakness anywhere.  She states that she has been using a wheelchair for longer walks since her back surgery due to chronic pain but she is able to put all extremities antigravity.  Patient spent a significant mount of time complaining about the IV in her right arm secondary to the pain that it was causing.  She stated she had difficulty moving it because it was just so painful.  She denied any confusion, slurred speech, facial droop, other weakness in her extremities prior to the IV being placed, sensation loss.  She stated when the symptoms happened her  was in the other room.  She tried calling for him when she decided that she needed to go to the ER but she states he did not answer right away so she does not for sure know that there is no other symptoms that were noticeable but she does not believe so.    In ER blood pressure was elevated.  ER gave 10 mg of IV hydralazine.  NIH was found to be a  1.  They did CT head which did not show any acute stroke they spoke with neurology who recommends CTA head and neck and MRI in the morning.  CTA was negative for any acute occlusion.  Symptoms continue to improve and vision changes/blurriness and almost resolved upon them calling for admission.     PAST MEDICAL AND SURGICAL HISTORY      Past Medical History:   Diagnosis Date   • Breast cancer (CMS/HCC)    • Hypertension        Past Surgical History:   Procedure Laterality Date   • BREAST LUMPECTOMY     • HAND SURGERY     • HYSTERECTOMY     • LAMINECTOMY     • SPINE SURGERY         MEDICATIONS      Prior to Admission medications    Medication Sig Start Date End Date Taking? Authorizing Provider   aspirin 81 mg enteric coated tablet Takes every other day. 12/14/21  Yes Tom Bonilla MD   cholecalciferol, vitamin D3, 400 unit (10 mcg) tablet tablet Take 1 tablet by mouth See admin instr.   Yes Tom Bonilla MD   gabapentin (NEURONTIN) 100 mg capsule Take 100 mg by mouth nightly.   Yes [facility] MD Tom   gabapentin (NEURONTIN) 300 mg capsule Take 300 mg by mouth every morning. 5/16/22  Yes Tom Bonilla MD   metoprolol tartrate (LOPRESSOR) 25 mg tablet Take 25 mg by mouth as needed (Blood pressure >140). 9/1/21  Yes Tom Bonilla MD   oxyCODONE (ROXICODONE) 15 mg immediate release tablet Take 10 mg by mouth 2 (two) times a day and an additional dose as needed for moderate pain or severe pain.   Yes Jonathon Bonilla MD   polyethylene glycol (MIRALAX) 17 gram/dose powder Take 17 g by mouth.   Yes Tom Bonilla MD   rosuvastatin (CRESTOR) 10 mg tablet Take 10 mg by mouth once daily. 5/15/22  Yes Tom Bonilla MD   triamterene-hydrochlorothiazide (MAXZIDE-25) 37.5-25 mg per tablet Take 1 tablet by mouth once daily. 5/11/22  Yes Tom Bonilla MD   zolpidem (AMBIEN) 10 mg tablet TAKE 1 TABLET BY MOUTH DAILY AT BEDTIME AS NEEDED FOR SLEEP. 6/27/22   Yes Tom Bonilla MD   cyclobenzaprine (FLEXERIL) 5 mg tablet Take 1 tablet (5 mg total) by mouth 3 (three) times a day as needed for muscle spasms. 9/19/22 12/2/23  Oscar Verde PA C   gabapentin (NEURONTIN) 100 mg capsule TAKE 1 CAPSULE BY MOUTH 4 TIMES A DAY 6/13/22 12/2/23  Tom Bonilla MD   ibuprofen (MOTRIN) 600 mg tablet Take 1 tablet (600 mg total) by mouth 4 (four) times a day as needed for mild pain (pain) for up to 4 days. 8/5/22 12/2/23  Octavia Frank MD   predniSONE (DELTASONE) 20 mg tablet Take 1 tablet (20 mg total) by mouth daily for 14 days. 3/17/23 12/2/23  Donald Lozano PA C   valACYclovir (VALTREX) 1 gram tablet TAKE TWO TABLETS BY MOUTH TWICE A DAY AS NEEDED 6/23/22 12/2/23  Tom Bonilla MD       ALLERGIES      Anastrozole and Iodine and iodide containing products    FAMILY HISTORY      Family History   Problem Relation Age of Onset   • Breast cancer Biological Mother    • Osteopenia Biological Father        SOCIAL HISTORY      Social History     Socioeconomic History   • Marital status:      Spouse name: None   • Number of children: None   • Years of education: None   • Highest education level: None   Tobacco Use   • Smoking status: Never   • Smokeless tobacco: Never   Vaping Use   • Vaping Use: Never used   Substance and Sexual Activity   • Alcohol use: Yes     Comment: 1 beer most days but not more than day   • Drug use: Never   • Sexual activity: Defer   Social History Narrative     - 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   HENT: Negative for congestion and sore throat.    Eyes:  Positive for visual disturbance. Negative for photophobia.   Respiratory: Negative for chest tightness and shortness of breath.    Cardiovascular: Negative for chest pain, palpitations and leg swelling.   Gastrointestinal: Negative for abdominal pain, diarrhea, nausea and vomiting.   Genitourinary: Negative for dysuria and frequency.   Musculoskeletal: Negative for back pain and neck pain.   Skin: Negative for rash and wound.   Neurological: Negative for dizziness, speech difficulty, weakness, light-headedness, numbness and headaches.   Psychiatric/Behavioral: Negative for agitation and behavioral problems.       PHYSICAL EXAMINATION      Temp:  [36.9 °C (98.4 °F)] 36.9 °C (98.4 °F)  Heart Rate:  [66-89] 72  Resp:  [15-33] 33  BP: (120-215)/() 120/67  Body mass index is 24.69 kg/m².    Visit Vitals  /67   Pulse 72   Temp 36.9 °C (98.4 °F)   Resp (!) 33   Wt 61.2 kg (135 lb)   SpO2 97%   BMI 24.69 kg/m²       General Appearance:    Alert, cooperative, no distress, appears stated age   Head:    Normocephalic, without obvious abnormality, atraumatic   Eyes:    PERRL, conjunctiva/corneas clear, EOM's intact              Throat:   Lips, mucosa, and tongue normal; teeth and gums normal   Neck:   Supple, symmetrical, trachea midline   Back:     Symmetric, no curvature, ROM normal, no CVA tenderness   Lungs:     Clear to auscultation bilaterally, respirations unlabored   Chest wall:    No tenderness or deformity   Heart:    Regular rate and rhythm, S1 and S2 normal, no murmur, rub    or gallop   Abdomen:     Soft, non-tender, bowel sounds active all four quadrants,     no masses, no organomegaly           Extremities:   Extremities normal, atraumatic, no cyanosis or edema   Pulses:   2+ and symmetric all extremities   Skin:   Skin color, texture, turgor normal, no rashes or lesions       Neurologic:   CNII-XII intact. Normal strength and antigravity in all 4 ext without drift, no sensation loss,   patient  appears to be alert and oriented to person place and situation     LABS / IMAGING / EKG        Labs  CBC Results       12/01/23 07/31/22 08/02/20     2305 2124 1045    WBC 10.70 8.76 9.91    RBC 4.65 4.80 4.88    HGB 13.9 14.8 14.9    HCT 41.6 44.7 43.9    MCV 89.5 93.1 90.0    MCH 29.9 30.8 30.5    MCHC 33.4 33.1 33.9     222 202        CMP Results       12/01/23 07/31/22 08/02/20 2116 2124 1044     136 137    K 4.4 3.8 3.9    Cl 104 103 103    CO2 26 24 25    Glucose 115 129 94    BUN 19 15 18    Creatinine 0.9 0.6 0.9    Calcium 9.4 8.8 9.1    Anion Gap 7 9 9    AST 37 -- --    ALT 13 -- --    Albumin 4.3 -- --    EGFR >60.0 >60.0 >60.0         Comment for NA at 2116 on 12/01/23: Moderate hemolysis, results may be affected.     Comment for K at 2116 on 12/01/23: Results obtained on plasma. Plasma Potassium values may be up to 0.4 mEQ/L less than serum values. The differences may be greater for patients with high platelet or white cell counts.  Moderate hemolysis, results may be affected.     Comment for K at 2124 on 07/31/22: Results obtained on plasma. Plasma Potassium values may be up to 0.4 mEQ/L less than serum values. The differences may be greater for patients with high platelet or white cell counts.    Comment for K at 1044 on 08/02/20:   Results obtained on plasma. Plasma Potassium values may be up to 0.4 mEQ/L less than serum values. The differences may be greater for patients with high platelet or white cell counts.    Comment for AST at 2116 on 12/01/23: Moderate hemolysis, results may be affected.     Comment for Albumin at 2116 on 12/01/23: Moderate hemolysis, results may be affected.         Troponin I Results       12/02/23 12/01/23     0204 2305    HS Troponin I 5.9 6.8        Microbiology Results     ** No results found for the last 720 hours. **        UA Results    No lab values to display.           Imaging  Patient Name: kulwinder howardi Exam(s): CTA head  CTA neck  head  CT  MR#: [id number]     History: Stated Reason for Visit: Was watching TV, got blurry vision, has since gotten better Isovue 370, 100 ml no reaction    Preliminary Impression:      CT HEAD  FINDINGS:  c/w: No prior.    Brain parenchyma unremarkable.  No recent cortical infarct.  No mass effect, midline shift, or hydrocephalus.  No acute hemorrhage.    No calvarial fracture.    IMPRESSION:  1. No acute intracranial abnormality.    CTA HEAD    FINDINGS:  c/w: No prior.    ICAs: No stenosis, occlusion, or aneurysm.    ACAs: Bilateral A1 segments visualized. No stenosis or aneurysm.    MCAs: M1 segments patent. Symmetric branching and caliber. No stenosis, occlusion, aneurysm.    Posterior circulation: Distal vertebral arteries, vertebrobasilar junction, and basilar apex unremarkable for focal stenosis or aneurysm.    No arteriovenous malformation or mass is identified.      IMPRESSION:  Unremarkable CT angiogram of skull base vessels. No large vessel occlusion.      CTA NECK    FINDINGS:  c/w: No prior.    RIGHT:  CCA: Unremarkable.  ICA origin: No significant stenosis.  Cervical ICA: No significant stenosis or occlusion.  Vertebral artery: Unremarkable    LEFT:  CCA: Unremarkable.  ICA origin: No significant stenosis.  Cervical ICA: No significant stenosis or occlusion.  Vertebral artery: Unremarkable    Aortic arch and great vessels: No stenosis, dissection, or aneurysm.    Soft tissues: No mass or lesion.      IMPRESSION:  1. No hemodynamically significant stenosis.            Interpreted by: Husam Goldstein MD, Dec 02, 2023 12:20 AM   DIEGO Dias         ECG/Telemetry  I have independently reviewed the ECG. Significant findings include Sinus rhythm rate of 60 QTc 420.    ASSESSMENT AND PLAN           Vision changes  Assessment & Plan  Seems like symptoms may have resolved?  Patient still says right may be slightly blurry  ER discussed with neurology recommending admission for MRI to rule out strokes  If MRI negative-  Patient will need ophthalmology follow-up    Chronic pain  Assessment & Plan  Will continue with patient's oxycodone since she takes it regularly  Continue with gabapentin per patient she takes about 300 mg in the morning and 100 mg at night    Other hyperlipidemia  Assessment & Plan  Continue with statin    Hypertension  Assessment & Plan  Per patient she has known labile blood pressures  Apparently significantly elevated at home and she took an extra dose of her Maxide and a dose of metoprolol  In ER they gave her hydralazine and blood pressure has normalized  Due to the fact that patient is a stroke rule out we will hold blood pressure medication for now for allowing for permissive hypertension  Monitor blood pressures    * TIA (transient ischemic attack)  Assessment & Plan  Came in for bilateral blurry vision now seemingly having some mild or blurry vision in the right eye but may be resolved  NIH of possibly 1  CT head and CTA head and neck negative for any acute occlusion or bleed  ER discussed with neurology who wanted patient admitted for MRI  Aspirin  Continue statin  Neurochecks  Telemetry observation  Explained to patient we cannot give her her nightly Ambien         VTE Assessment: Pad[place]    Code Status: Full Code  Estimated discharge date: 12/4/2023     This patient note has been dictated using speech recognition software. Inadvertent speech recognition errors should be disregarded.  Capri Hankins, DO  12/2/2023

## 2023-12-02 NOTE — ASSESSMENT & PLAN NOTE
Will continue with patient's oxycodone since she takes it regularly  Continue with gabapentin per patient she takes about 300 mg in the morning and 100 mg at night

## 2023-12-02 NOTE — ED ATTESTATION NOTE
I have personally seen and examined Tamara Omalley.  I was involved in the care, management and medical decision making for this patient.  I reviewed and agree with physician assistant (PA-C) assessment and plan of care; we discussed the case and the treatment plan. Any exceptions are documented below.    Exam:  Patient Vitals for the past 72 hrs:   BP Temp Pulse Resp SpO2 Weight   12/01/23 2100 (!) 195/89 36.9 °C (98.4 °F) 89 20 100 % 61.2 kg (135 lb)     VS reviewed, NAD, nontoxic, head at/nc, normal speech, no resp distress, normal skin tone, coordinated movement. PERRL. EOMI. No abnormal findings upon otoscopic exam. Symmetric facial features. B/L UE/LE with coordinated movement and equal strength. Lungs ctab. Cardiac rrr without m/r/g.      Faustino Dias,   12/01/23 1760

## 2023-12-02 NOTE — CONSULTS
[facility]   INITIAL NEUROLOGY CONSULT     Admit Date: 12/1/2023  Date of Service: 12/02/23  LOS: 0 days   Consult Requested by: Faustino Dias DO;Epifanio*   Reason for Consult: Blurry vision      HPI  Tamara Omalley is a 73 y.o. female with a history of HTN, HLD, migraines, breast cancer s/p lumpectomy for whom Neurology was consulted due to blurry vision.     History is obtained from the patient. In addition, any available clinical notes from referring provider are reviewed in detail. All pertinent findings and results have been summarized below.    LKN [id number] when while watching TV she developed blurry vision. Took her BP at home and it was elevated in the 190s. She felt her vision was blurry bilaterally, but she did not isolate either eye. She says that her blurry vision improved in the L eye, but persisted in the R eye though again she did not isolate both eyes until this morning. She says this morning that she continues to have some blurriness of vision in the R eye that she feels is worse than her baseline. She has never had a similar episode before. She reports a mild headache afterwards. No weakness, numbness, dysarthria, diplopia. She takes her BP at home and says it can range from 140s - 200s. She feels this is associated with pain from her arthritis.    BP on arrival was 195/89, repeat 215/102. Other vitals wnl. CMP wnl. WBCs 10.70  -> 8.67. CRP 1 and ESR 11. UA was bland. CTA H/N was done and did not show any acute intracranial abnormality. EKG showed NSR. MRI brain w/o was negative for stroke.    Review of Systems: A comprehensive 10+ review of systems was negative except as noted in HPI       Past Medical History:   Past Medical History:   Diagnosis Date   • Breast cancer (CMS/HCC)    • Hypertension        Past Surgical History:   Past Surgical History:   Procedure Laterality Date   • BREAST LUMPECTOMY     • HAND SURGERY     • HYSTERECTOMY     • LAMINECTOMY     • SPINE SURGERY         Home  Medications:  Prior to Admission medications    Medication Sig Start Date End Date Taking? Authorizing Provider   aspirin 81 mg enteric coated tablet Takes every other day. 12/14/21  Yes Tom Bonilla MD   cholecalciferol, vitamin D3, 400 unit (10 mcg) tablet tablet Take 1 tablet by mouth See admin instr.   Yes Tom Bonilla MD   gabapentin (NEURONTIN) 100 mg capsule Take 100 mg by mouth nightly.   Yes Jonathon Bonilla MD   gabapentin (NEURONTIN) 300 mg capsule Take 300 mg by mouth every morning. 5/16/22  Yes Tom Bonilla MD   metoprolol tartrate (LOPRESSOR) 25 mg tablet Take 25 mg by mouth as needed (Blood pressure >140). 9/1/21  Yes Tom Bonilla MD   oxyCODONE (ROXICODONE) 15 mg immediate release tablet Take 10 mg by mouth 2 (two) times a day and an additional dose as needed for moderate pain or severe pain.   Yes Jonathon Bonilla MD   polyethylene glycol (MIRALAX) 17 gram/dose powder Take 17 g by mouth.   Yes Tom Bonilla MD   rosuvastatin (CRESTOR) 10 mg tablet Take 10 mg by mouth once daily. 5/15/22  Yes Tom Bonilla MD   triamterene-hydrochlorothiazide (MAXZIDE-25) 37.5-25 mg per tablet Take 1 tablet by mouth once daily. 5/11/22  Yes Tom Bonilla MD   zolpidem (AMBIEN) 10 mg tablet TAKE 1 TABLET BY MOUTH DAILY AT BEDTIME AS NEEDED FOR SLEEP. 6/27/22  Yes Tom Bonilla MD       Allergies:  Allergies   Allergen Reactions   • Anastrozole      Other reaction(s): Arthralgias, Myalgias  Pain in finger joints  Pain in finger joints     • Iodine And Iodide Containing Products Rash     Topical Iodine  Topical Iodine         Social History:   Social History     Socioeconomic History   • Marital status:      Spouse name: Not on file   • Number of children: Not on file   • Years of education: Not on file   • Highest education level: Not on file   Occupational History   • Not on file   Tobacco Use   • Smoking status: Never   •  Smokeless tobacco: Never   Vaping Use   • Vaping Use: Never used   Substance and Sexual Activity   • Alcohol use: Yes     Comment: 1 beer most days but not more than day   • Drug use: Never   • Sexual activity: Defer   Other Topics Concern   • Not on file   Social History Narrative     - 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      Social Determinants of Health     Financial Resource Strain: Not on file   Food Insecurity: No Food Insecurity (12/1/2023)    Hunger Vital Sign    • Worried About Running Out of Food in the Last Year: Never true    • Ran Out of Food in the Last Year: Never true   Transportation Needs: Not on file   Physical Activity: Not on file   Stress: Not on file   Social Connections: Not on file   Intimate Partner Violence: Not on file   Housing Stability: Not on file       Family History:   Family History   Problem Relation Age of Onset   • Hypertension Biological Mother    • Breast cancer Biological Mother    • Osteopenia Biological Father          Medications   No current facility-administered medications on file prior to encounter.     Current Outpatient Medications on File Prior to Encounter   Medication Sig Dispense Refill   • aspirin 81 mg enteric coated tablet Takes every other day.     • cholecalciferol, vitamin D3, 400 unit (10 mcg) tablet tablet Take 1 tablet by mouth See admin instr.     • gabapentin (NEURONTIN) 100 mg capsule Take 100 mg by mouth nightly.     • gabapentin (NEURONTIN) 300 mg capsule Take 300 mg by mouth every morning.     • metoprolol tartrate (LOPRESSOR) 25 mg tablet Take 25 mg by mouth as needed (Blood pressure >140).     • oxyCODONE (ROXICODONE) 15 mg immediate release tablet Take 10 mg by mouth 2 (two) times a day and an additional dose as needed for moderate pain or severe pain.     • polyethylene glycol (MIRALAX) 17 gram/dose powder Take 17 g by mouth.     • rosuvastatin (CRESTOR) 10 mg tablet Take 10 mg by mouth once daily.     •  triamterene-hydrochlorothiazide (MAXZIDE-25) 37.5-25 mg per tablet Take 1 tablet by mouth once daily.     • zolpidem (AMBIEN) 10 mg tablet TAKE 1 TABLET BY MOUTH DAILY AT BEDTIME AS NEEDED FOR SLEEP.            Scheduled Meds:  • aspirin  81 mg oral Daily   • cholecalciferol (vitamin D3)  400 Units oral Daily   • gabapentin  100 mg oral Nightly   • gabapentin  300 mg oral q AM   • polyethylene glycol  17 g oral Daily   • rosuvastatin  10 mg oral Daily (8a)   • [Provider Managed Hold] triamterene-hydrochlorothiazide  1 tablet oral Daily (8a)      Continuous Infusions:   PRN Meds:.•  acetaminophen **OR** acetaminophen **OR** acetaminophen  •  glucose **OR** dextrose **OR** glucagon **OR** dextrose 50 % in water (D50)  •  metoprolol tartrate  •  oxyCODONE     Physical Exam:  Vitals:    12/02/23 0220 12/02/23 0350 12/02/23 0510 12/02/23 0634   BP:  130/78 (!) 152/80 (!) 116/56   BP Location:    Right upper arm   Patient Position:    Lying   Pulse: 72 72 68 64   Resp: (!) 33 13 17 18   Temp:    36.9 °C (98.5 °F)   TempSrc:    Oral   SpO2: 97% 96% 97% 95%   Weight:           Appearance: not in distress     Heart:: Regular rate and rhythm   Chest: Breathing is non-labored, with no accessory muscle use or costal retractions.   Abdomen: The abdomen is soft, non-tender, and non-distended   Peripheral Vasculature: No peripheral edema  Skin:No rashes on exposed skin.      Mental Status:  Orientation: alert and oriented to person, place, time, and situation   Language: Language was fluent with intact repetition, naming, and comprehension     Neuro Exam:   Cranial Nerves  Visual Fields (II): Visual fields were normal.  Visual acuity was 20/40 in L eye and 20/50 in R eye  Pupils (II): Pupils were equal and reactive to light. There was no afferent pupillary defect.   Eye Movements (III, IV, VI): Extra-ocular movements were full. Ptosis was absent. There was no nystagmus  Facial Sensation (V): normal  Facial Strength (VII):  symmetric with normal strength  Hearing (VIII): Hearing was intact  IX, X, XI, XII:  Palate movements were normal. Neck strength was normal. There was normal tongue bulk and speed of movement     Motor  Bulk: Muscle bulk was normal  Tone: Tone was normal   Strength:   No pronator drift, no orbiting    Right Left   Deltoid/Shoulder Abductors (C5) 5 5   Biceps/Elbow Flexors (C5)  5 5   Wrist Extensors (C6)       5 5   Triceps/Elbow Extensors (C7) 5 5    5 5      Hip Flexors (L2) 5 5   Knee Extensors (L3) 5 5   Knee Flexors (L5)            5 5   Ankle Dorsiflexors (L4) 5 5   Ankle Plantar flexors (S1) 5 5      Sensation                                                                                                               Intact to LT bilaterally  No extinction to double simultaneous stimuli    Coordination  Finger-Nose/Heel-Shin: no dysmetria     Gait: deferred    Reflexes  Stretch Reflexes:        Right Left   Biceps (C5,C6) 2+ 2+   Brachioradialis (C5-7) 2+ 2+   Patellar (L2-4) 2+ Deferred due to knee pain   Ankle (S1-S2) 2+ 2+      Primitive Reflexes: Wright's sign was absent.      OTHER: No tremor and no abnormal movements.     Laboratory Studies    CBC Results       12/02/23 12/01/23 07/31/22     0618 2305 2124    WBC 8.67 10.70 8.76    RBC 4.44 4.65 4.80    HGB 13.3 13.9 14.8    HCT 39.6 41.6 44.7    MCV 89.2 89.5 93.1    MCH 30.0 29.9 30.8    MCHC 33.6 33.4 33.1     217 222        BMP Results       12/02/23 12/01/23 07/31/22     0618 2116 2124     137 136    K 3.4 4.4 3.8    Cl 108 104 103    CO2 25 26 24    Glucose 96 115 129    BUN 15 19 15    Creatinine 0.8 0.9 0.6    Calcium 9.3 9.4 8.8    Anion Gap 8 7 9    EGFR >60.0 >60.0 >60.0         Comment for NA at 2116 on 12/01/23: Moderate hemolysis, results may be affected.     Comment for K at 0618 on 12/02/23: Results obtained on plasma. Plasma Potassium values may be up to 0.4 mEQ/L less than serum values. The differences may be greater  for patients with high platelet or white cell counts.    Comment for K at 2116 on 12/01/23: Results obtained on plasma. Plasma Potassium values may be up to 0.4 mEQ/L less than serum values. The differences may be greater for patients with high platelet or white cell counts.  Moderate hemolysis, results may be affected.     Comment for K at 2124 on 07/31/22: Results obtained on plasma. Plasma Potassium values may be up to 0.4 mEQ/L less than serum values. The differences may be greater for patients with high platelet or white cell counts.        CMP Results       12/02/23 12/01/23 07/31/22     0618 2116 2124     137 136    K 3.4 4.4 3.8    Cl 108 104 103    CO2 25 26 24    Glucose 96 115 129    BUN 15 19 15    Creatinine 0.8 0.9 0.6    Calcium 9.3 9.4 8.8    Anion Gap 8 7 9    AST -- 37 --    ALT -- 13 --    Albumin -- 4.3 --    EGFR >60.0 >60.0 >60.0         Comment for NA at 2116 on 12/01/23: Moderate hemolysis, results may be affected.     Comment for K at 0618 on 12/02/23: Results obtained on plasma. Plasma Potassium values may be up to 0.4 mEQ/L less than serum values. The differences may be greater for patients with high platelet or white cell counts.    Comment for K at 2116 on 12/01/23: Results obtained on plasma. Plasma Potassium values may be up to 0.4 mEQ/L less than serum values. The differences may be greater for patients with high platelet or white cell counts.  Moderate hemolysis, results may be affected.     Comment for K at 2124 on 07/31/22: Results obtained on plasma. Plasma Potassium values may be up to 0.4 mEQ/L less than serum values. The differences may be greater for patients with high platelet or white cell counts.    Comment for AST at 2116 on 12/01/23: Moderate hemolysis, results may be affected.     Comment for Albumin at 2116 on 12/01/23: Moderate hemolysis, results may be affected.           Results from last 7 days   Lab Units 12/01/23  2305   INR  1.0   PTT sec 30   PROTIME sec  "12.9       Lab Most recent values   Ha1C Lab Results   Component Value Date    HGBA1C 5.5 12/02/2023      Cholesterol Lipid Panel:  Lab Results   Component Value Date    CHOL 158 12/02/2023     Lab Results   Component Value Date    HDL 81 12/02/2023     Lab Results   Component Value Date    LDLCALC 60 12/02/2023     Lab Results   Component Value Date    TRIG 84 12/02/2023     No results found for: \"CHOLHDL\"   INR Lab Results   Component Value Date    INR 1.0 12/01/2023      TSH Lab Results   Component Value Date    TSH 2.62 08/12/2019      BNP No results found for: \"BNP\"   Trop/CK Lab Results   Component Value Date    HSTROPONINI 8.4 12/02/2023    HSTROPONINI 5.9 12/02/2023    HSTROPONINI 6.8 12/01/2023      D-dimer No results found for: \"DDIMER\"   ESR Lab Results   Component Value Date    SEDRATE 11 12/01/2023      RPR No results found for: \"RPR\", \"RPRTITER\"   Other:      Urine Drug Screen Results    No lab values to display.         Imaging/Procedures  I have personally reviewed the images for the studies listed below. My interpretation is noted as following:    CTH wo 12/01/2023:  No acute intracranial abnormality    CTA H/N 12/01/2023:  Mild calcified plaque at origin of R ICA. No hemodynamically significant intracranial or extracranial stenosis noted.     MRI Brain w/o 12/02/2023:  No acute infarct. Mild white matter disease likely 2/2 SVID.       Assessment and Plan  Tamara Omalley is a 73 y.o. female with a history of HTN, HLD, migraines, breast cancer s/p lumpectomy for whom Neurology was consulted due to blurry vision.     #Bilateral blurry vision  #Diminished visual acuity in R eye    Given description provided by patient it is unclear if she had a monocular visual disturbance or bilateral eye involvement. She reports bilateral eye blurry vision. This is unlikely to be due to a TIA or stroke. In the event that she may have interpreted a hemifield visual disturbance as blurry vision I recommended an " evaluation for occipital stroke with MRI brain which has been unrevealing. A BRAO or CRAO seems less likely given her description of bilateral eye involvement. Ultimately this may be due to hypertension or a primary ocular issue such as dry eye. Her BP should be controlled and she should have outpatient Ophthalmology evaluation.     - Followup formal MRI brain read  - BP control  - Outpatient Ophthalmology evaluation    I discussed the aforementioned assessment and plan with the patient and her . I addressed their questions. In addition, I discussed these recommendations with the primary team.     Thank you for allowing me to assist in the care of this patient. We will sign off at this time. Please feel free to reach out with any questions or concerns that may arise.     Scheduled Neurology follow-up is not needed.     Nanci Esteban MD  Neurohospitalist  I am available through [employer] chat. Alternatively if urgent please page.

## 2023-12-02 NOTE — ED PROVIDER NOTES
"Emergency Medicine Note  HPI   HISTORY OF PRESENT ILLNESS     Patient is a 73-year-old female with past medical history of hypertension, breast cancer, and chronic back pain who presents for evaluation of painless bilateral blurred vision that started around 7:30 PM tonight while watching TV.  She states she checked her blood pressure which was 178/98 at home and took metoprolol and hydrochlorothiazide to try to lower it.  She describes the blurred vision as \"fuzzy\" and is unsure if it is over 1 part of her vision or not.  She states the left eye blurred vision has resolved but that the right is still blurry.  She also reports left knee pain and back pain from previous surgery but it is chronic and that she takes 10 mg of oxycodone twice daily for this. Denies weakness, dysarthria, headache, syncope, or memory loss.            Patient History   PAST HISTORY     Reviewed from Nursing Triage:       Past Medical History:   Diagnosis Date   • Breast cancer (CMS/HCC)    • Hypertension        History reviewed. No pertinent surgical history.    Family History   Problem Relation Age of Onset   • Breast cancer Biological Mother    • Osteopenia Biological Father        Social History     Tobacco Use   • Smoking status: Never   • Smokeless tobacco: Never   Vaping Use   • Vaping Use: Never used   Substance Use Topics   • Alcohol use: Not Currently   • Drug use: Never         Review of Systems   REVIEW OF SYSTEMS     Review of Systems   Constitutional: Negative for chills and fever.   Eyes: Positive for visual disturbance.   Respiratory: Negative for shortness of breath.    Cardiovascular: Negative for chest pain.   Gastrointestinal: Negative for abdominal pain, diarrhea, nausea and vomiting.   Neurological: Negative for dizziness, tremors, seizures, syncope, facial asymmetry, speech difficulty, light-headedness, numbness and headaches.         VITALS     ED Vitals    Date/Time Temp Pulse Resp BP SpO2 [facility]   12/02/23 0220 -- 72 " 33 -- 97 % BAB   12/02/23 0110 -- 66 15 120/67 -- BAB   12/02/23 0030 -- 74 23 143/76 -- BAB   12/01/23 2322 -- -- -- 177/84 -- BAB   12/01/23 2230 -- 72 18 215/102 96 % NMN   12/01/23 2100 36.9 °C (98.4 °F) 89 20 195/89 100 % AW        Pulse Ox %: 100 % (12/01/23 2100)  Pulse Ox Interpretation: Normal (12/01/23 2100)           Physical Exam   PHYSICAL EXAM     Physical Exam  Constitutional:       General: She is not in acute distress.     Appearance: She is not ill-appearing, toxic-appearing or diaphoretic.   Eyes:      General: No visual field deficit.     Intraocular pressure: Right eye pressure is 17 mmHg. Left eye pressure is 15 mmHg. Measurements were taken using an automated tonometer.     Extraocular Movements: Extraocular movements intact.      Right eye: Normal extraocular motion and no nystagmus.      Left eye: Normal extraocular motion and no nystagmus.      Conjunctiva/sclera: Conjunctivae normal.      Right eye: Right conjunctiva is not injected. No hemorrhage.     Left eye: Left conjunctiva is not injected. No hemorrhage.     Pupils: Pupils are equal, round, and reactive to light.   Neck:      Vascular: Normal carotid pulses. No carotid bruit or JVD.   Cardiovascular:      Rate and Rhythm: Normal rate and regular rhythm.      Heart sounds: Normal heart sounds, S1 normal and S2 normal. No murmur heard.     No gallop.   Pulmonary:      Effort: Pulmonary effort is normal. No respiratory distress.      Breath sounds: No wheezing, rhonchi or rales.   Abdominal:      Tenderness: There is no abdominal tenderness. There is no guarding or rebound.   Musculoskeletal:      Right lower leg: No edema.      Left lower leg: No edema.   Skin:     General: Skin is warm.      Capillary Refill: Capillary refill takes less than 2 seconds.   Neurological:      Mental Status: She is alert and oriented to person, place, and time.      GCS: GCS eye subscore is 4. GCS verbal subscore is 5. GCS motor subscore is 6.       Cranial Nerves: Cranial nerve deficit present. No dysarthria or facial asymmetry.      Sensory: Sensation is intact.      Motor: Motor function is intact. No weakness, tremor, seizure activity or pronator drift.      Coordination: Coordination normal. Finger-Nose-Finger Test normal.      Comments: Right eye: vision 20/50.  IOP 17  Left eye: vision 20/20.  IOP 15   Psychiatric:         Mood and Affect: Mood normal.         Behavior: Behavior normal.           PROCEDURES     Procedures     DATA     Results     Procedure Component Value Units Date/Time    HS Troponin (with 2 hour reflex) [[id number]]  (Normal) Collected: 12/01/23 2305    Specimen: Blood, Venous Updated: 12/01/23 2355     High Sens Troponin I 6.8 pg/mL     APTT [[id number]]  (Normal) Collected: 12/01/23 2305    Specimen: Blood, Venous Updated: 12/01/23 2333     PTT 30 sec     Protime-INR [[id number]]  (Normal) Collected: 12/01/23 2305    Specimen: Blood, Venous Updated: 12/01/23 2333     PT 12.9 sec      INR 1.0     Comment: INR has no defined significance when PT is within Reference Range.       CBC and differential [[id number]]  (Abnormal) Collected: 12/01/23 2305    Specimen: Blood, Venous Updated: 12/01/23 2325     WBC 10.70 K/uL      RBC 4.65 M/uL      Hemoglobin 13.9 g/dL      Hematocrit 41.6 %      MCV 89.5 fL      MCH 29.9 pg      MCHC 33.4 g/dL      RDW 13.7 %      Platelets 217 K/uL      MPV 12.2 fL      Differential Type Auto     nRBC 0.0 %      Immature Granulocytes 0.7 %      Neutrophils 63.7 %      Lymphocytes 25.6 %      Monocytes 8.4 %      Eosinophils 0.9 %      Basophils 0.7 %      Immature Granulocytes, Absolute 0.08 K/uL      Neutrophils, Absolute 6.81 K/uL      Lymphocytes, Absolute 2.74 K/uL      Monocytes, Absolute 0.90 K/uL      Eosinophils, Absolute 0.10 K/uL      Basophils, Absolute 0.07 K/uL     Sedimentation rate [[id number]] Collected: 12/01/23 2305    Specimen: Blood, Venous Updated: 12/01/23 2321    C-reactive protein  [[id number]]  (Normal) Collected: 12/01/23 2116    Specimen: Blood, Venous Updated: 12/01/23 2306     CRP 1.00 mg/L     Comprehensive metabolic panel [[id number]]  (Abnormal) Collected: 12/01/23 2116    Specimen: Blood, Venous Updated: 12/01/23 2154     Sodium 137 mEQ/L      Comment: Moderate hemolysis, results may be affected.         Potassium 4.4 mEQ/L      Comment: Results obtained on plasma. Plasma Potassium values may be up to 0.4 mEQ/L less than serum values. The differences may be greater for patients with high platelet or white cell counts.  Moderate hemolysis, results may be affected.         Chloride 104 mEQ/L      CO2 26 mEQ/L      BUN 19 mg/dL      Creatinine 0.9 mg/dL      Glucose 115 mg/dL      Calcium 9.4 mg/dL      AST (SGOT) 37 IU/L      Comment: Moderate hemolysis, results may be affected.         ALT (SGPT) 13 IU/L      Alkaline Phosphatase 31 IU/L      Comment: Moderate hemolysis, results may be affected.         Total Protein 7.1 g/dL      Comment: Test performed on plasma which typically contains approximately 0.4 g/dL more protein than serum.        Albumin 4.3 g/dL      Comment: Moderate hemolysis, results may be affected.         Bilirubin, Total 0.6 mg/dL      eGFR >60.0 mL/min/1.73m*2      Anion Gap 7 mEQ/L           Imaging Results          CT ANGIOGRAPHY HEAD/NECK WITH AND WITHOUT IV CONTRAST (Preliminary result)  Result time 12/02/23 00:23:38    Preliminary Interpretation    Patient Name: luly howard  Exam(s): CTA head  CTA neck  head CT  MR#: [id number]       History: Stated Reason for Visit: Was watching TV, got blurry vision, has since gotten better Isovue 370, 100 ml no reaction    Preliminary Impression:      CT HEAD  FINDINGS:  c/w: No prior.    Brain parenchyma unremarkable.  No recent cortical infarct.  No mass effect, midline shift, or hydrocephalus.  No acute hemorrhage.    No calvarial fracture.    IMPRESSION:  1. No acute intracranial abnormality.    CTA  HEAD    FINDINGS:  c/w: No prior.    ICAs: No stenosis, occlusion, or aneurysm.    ACAs: Bilateral A1 segments visualized. No stenosis or aneurysm.    MCAs: M1 segments patent. Symmetric branching and caliber. No stenosis, occlusion, aneurysm.    Posterior circulation: Distal vertebral arteries, vertebrobasilar junction, and basilar apex unremarkable for focal stenosis or aneurysm.    No arteriovenous malformation or mass is identified.      IMPRESSION:  Unremarkable CT angiogram of skull b  ase vessels. No large vessel occlusion.      CTA NECK    FINDINGS:  c/w: No prior.    RIGHT:  CCA: Unremarkable.  ICA origin: No significant stenosis.  Cervical ICA: No significant stenosis or occlusion.  Vertebral artery: Unremarkable    LEFT:  CCA: Unremarkable.  ICA origin: No significant stenosis.  Cervical ICA: No significant stenosis or occlusion.  Vertebral artery: Unremarkable    Aortic arch and great vessels: No stenosis, dissection, or aneurysm.    Soft tissues: No mass or lesion.      IMPRESSION:  1. No hemodynamically significant stenosis.            Interpreted by: Husam Goldstein MD, Dec 02, 2023 12:20 AM                                  ECG 12 lead   Independent Interpretation by ED Provider   ECG 22:47 - nsr 60 bpm, nl axis, good rwp, no st/t wave changes, qt/qtc 420/420 ms.             Scoring tools                  NIH Stroke Scale: 1                 ED Course & MDM   MDM / ED COURSE / CLINICAL IMPRESSION / DISPO     Medical Decision Making  DDx considered but not limited to stroke, TIA, hypertensive emergency, acute angle closure glaucoma.    Blurred vision: acute illness or injury  Amount and/or Complexity of Data Reviewed  Labs: ordered.  Radiology: ordered and independent interpretation performed. Decision-making details documented in ED Course.  ECG/medicine tests: ordered and independent interpretation performed.      Risk  OTC drugs.  Prescription drug management.  Decision regarding  "hospitalization.          ED Course as of 12/02/23 0233   Fri Dec 01, 2023   2223 Discussed with RENETTA. Agree with plan.   20/30 on right  20/20 on left  Onset 930PM [NS]   2225 Pt seen and evaluated. HPI reviewed; states onset of \"blurry\"/\"Fuzzy\" vision at 7PM when watching TV. Was improved but remained to right eye (states normal vision to left eye at time of arrival to ED). No h/o similar. No h/o vascular disease. Does f/u with Dr. Canas for cardiology and states on anti-HTN; took additional HCTZ this evening and a PRN metoprolol secondary to noted elevated BP.  Pt without other neuro deficits. Remains HTN. Plan to check CT, labs and discuss with neurology. Hydralazine ordered for BP.  [NS]   2231 Neurology paged [CM]   2247 ECG 22:47 - nsr 60 bpm, nl axis, good rwp, no st/t wave changes, qt/qtc 420/420 ms.  [NS]   2310 Discussed with neurology Dr. Esteban, he doubts this is neurologic in etiology given it started bilaterally.  Feels it is likely more ophthalmologic.  He recommends CTA head and neck with MRI in the morning.  We will check CRP and ESR.  Patient blood pressure currently 215/102 so we will treat with 10 mg IV hydralazine and reassess. [CM]   2334 Pt has had previously had CT abd pelvis w/ IV contrast at [facility] in 2016 and reports she did not have any reaction [CM]   Sat Dec 02, 2023   0023 CT ANGIOGRAPHY HEAD/NECK WITH AND WITHOUT IV CONTRAST  Patient Name: luly howard  Exam(s): CTA head  CTA neck  head CT  MR#: [id number]       History: Stated Reason for Visit: Was watching TV, got blurry vision, has since gotten better Isovue 370, 100 ml no reaction    Preliminary Impression:      CT HEAD  FINDINGS:  c/w: No prior.    Brain parenchyma unremarkable.  No recent cortical infarct.  No mass effect, midline shift, or hydrocephalus.  No acute hemorrhage.    No calvarial fracture.    IMPRESSION:  1. No acute intracranial abnormality.    CTA HEAD    FINDINGS:  c/w: No prior.    ICAs: No stenosis, " occlusion, or aneurysm.    ACAs: Bilateral A1 segments visualized. No stenosis or aneurysm.    MCAs: M1 segments patent. Symmetric branching and caliber. No stenosis, occlusion, aneurysm.    Posterior circulation: Distal vertebral arteries, vertebrobasilar junction, and basilar apex unremarkable for focal stenosis or aneurysm.    No arteriovenous malformation or mass is identified.      IMPRESSION:  Unremarkable CT angiogram of skull base vessels. No large vessel occlusion.      CTA NECK    FINDINGS:  c/w: No prior.    RIGHT:  CCA: Unremarkable.  ICA origin: No significant stenosis.  Cervical ICA: No significant stenosis or occlusion.  Vertebral artery: Unremarkable    LEFT:  CCA: Unremarkable.  ICA origin: No significant stenosis.  Cervical ICA: No significant stenosis or occlusion.  Vertebral artery: Unremarkable    Aortic arch and great vessels: No stenosis, dissection, or aneurysm.    Soft tissues: No mass or lesion.      IMPRESSION:  1. No hemodynamically significant stenosis.            Interpreted by: Husam Goldstein MD, Dec 02, 2023 12:20 AM     [NS]   0057 Blurred vision resolved.  Pt updated with results of CT scan.  Agreeable to admission for MRI .  [facility] paged [CM]   0221 Spoke with [facility], will see pt [CM]   0225 ASA ordered [CM]      ED Course User Index  [CM] Ishan Leija PA C  [NS] Faustino Dias E, DO     Clinical Impression      Blurred vision     _________________     ED Disposition   Admit / Observation                   Ishan Leija PA C  12/02/23 7101

## 2024-02-05 ENCOUNTER — TELEPHONE (OUTPATIENT)
Dept: NEUROSURGERY | Facility: CLINIC | Age: 74
End: 2024-02-05
Payer: COMMERCIAL

## 2024-02-05 NOTE — TELEPHONE ENCOUNTER
Pt calling for an appt to discuss leg pain.  Is it okay to schedule and will you need new imaging?

## 2024-03-04 ENCOUNTER — OFFICE VISIT (OUTPATIENT)
Dept: NEUROSURGERY | Facility: CLINIC | Age: 74
End: 2024-03-04
Payer: COMMERCIAL

## 2024-03-04 VITALS
HEART RATE: 88 BPM | HEIGHT: 62 IN | DIASTOLIC BLOOD PRESSURE: 82 MMHG | BODY MASS INDEX: 21.94 KG/M2 | OXYGEN SATURATION: 99 % | SYSTOLIC BLOOD PRESSURE: 120 MMHG

## 2024-03-04 DIAGNOSIS — M54.16 LUMBAR RADICULOPATHY: Primary | ICD-10-CM

## 2024-03-04 PROCEDURE — 99215 OFFICE O/P EST HI 40 MIN: CPT | Performed by: NEUROLOGICAL SURGERY

## 2024-03-04 PROCEDURE — 3008F BODY MASS INDEX DOCD: CPT | Performed by: NEUROLOGICAL SURGERY

## 2024-03-04 ASSESSMENT — PAIN SCALES - GENERAL: PAINLEVEL: 6

## 2024-03-04 NOTE — PROGRESS NOTES
"        [facility]  [address] [place] [address], [address]  [place] [address]  Phone: [phone]  Fax: [phone]        Date: 24    Re: Tamara Omalley  : 1950        Reason for visit:  Pain radiating down leg.    History of Present Illness:   Tamara Omalley is a 74 y.o. female who presents in neurosurgical follow up. She has a past medical history of breast carcinoma, proctalgia fugax, hyperlipidemia, hypertension, osteopenia and anal fissure who presented to [facility] ED on 22 with left lower extremity pain that started the same day. She reported left inferior knee and shin pain as well as left sided lower back pain that radiates down her lateral left leg to just below her knee. X-ray of the lumbar spine and left hip with degenerative joint disease seen but no acute fractures or abnormalities. MRI of the lumbar spine demonstrated multilevel lumbar degenerative changes and small left foraminal disc herniation at L3-L4. Patient's biggest concern of her inferior knee/anterior shin pain was not thought to align with the rest of her radicular pain. However her left radicular pain was thought likely due to her L3 compression. Conservative treatment was recommended in the form of injections, medications, and bedrest.  The patient decided for more aggressive treatment and underwent left L3-4 far lateral discectomy on 11/15/22 Dr. Dutta with Keraderm system.  The patient immediately after surgery she felt much better, however, after developing \"inflammation\" her pain worsened.      Since last being seen, the patient states she continues to have pain in the left shin pain, overall unchanged.  Additionally she has perirectal pain.  Otherwise no new complaints.    Medical History:  has a past medical history of Breast cancer (CMS/HCC) and Hypertension.    Surgical History: Left L3/4 far lateral discectomy on 11/15/22.    Family History: family history includes Breast cancer in " her biological mother; Hypertension in her biological mother; Osteopenia in her biological father.      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      Social Determinants of Health     Food Insecurity: No Food Insecurity (12/1/2023)    Hunger Vital Sign    • Worried About Running Out of Food in the Last Year: Never true    • Ran Out of Food in the Last Year: Never true        Allergies:   Allergies   Allergen Reactions   • Anastrozole      Other reaction(s): Arthralgias, Myalgias  Pain in finger joints  Pain in finger joints     • Iodine And Iodide Containing Products Rash     Topical Iodine  Topical Iodine         Medications:   Current Outpatient Medications   Medication Sig Dispense Refill   • aspirin 81 mg enteric coated tablet Takes every other day.     • cholecalciferol, vitamin D3, 400 unit (10 mcg) tablet tablet Take 1 tablet by mouth See admin instr.     • gabapentin (NEURONTIN) 100 mg capsule Take 100 mg by mouth nightly.     • gabapentin (NEURONTIN) 300 mg capsule Take 300 mg by mouth every morning.     • metoprolol tartrate (LOPRESSOR) 25 mg tablet Take 25 mg by mouth as needed (Blood pressure >140).     • oxyCODONE (ROXICODONE) 5 mg immediate release tablet Take 10 mg by mouth 2 (two) times a day and an additional dose as needed for moderate pain or severe pain.     • polyethylene glycol (MIRALAX) 17 gram/dose powder Take 17 g by mouth.     • rosuvastatin (CRESTOR) 10 mg tablet Take 10 mg by mouth once daily.     • triamterene-hydrochlorothiazide (MAXZIDE-25) 37.5-25 mg per tablet Take 1 tablet by  mouth once daily.     • zolpidem (AMBIEN) 10 mg tablet TAKE 1 TABLET BY MOUTH DAILY AT BEDTIME AS NEEDED FOR SLEEP.       No current facility-administered medications for this visit.       General physical examination:  The patient is well appearing female, sitting upright in her chair in no acute distress, she appears her stated age.  Her head is atraumatic, normocephalic. Her neck is supple without obvious adenopathy. Oral mucosa is moist. Her peripheral pulses are symmetric and palpable. Extremities without peripheral edema. Her breathing is normal and unlabored.     There were no vitals filed for this visit.     Well appearing female in no acute distress.    The patient is awake, alert, oriented x 3, with fluent speech, appropriate attention span, concentration and fund of knowledge.  Remote and recent memory are normal.    Cranial nerve examination reveals full visual fields to confrontation, pupils are equal round reactive to light, extra occular muscles are intact, there is no facial asymmetry and tongue protrudes midline.    On motor examination,    Hip flex      L2, L3 Knee ext    L3, L4 Dorsi  flex    L4, L5 Great toe ext    L5 Plantar flex    S1   L 4+ 4+ PL 5 5 5   R 5 5 5 5 5   There is no dysmetria on finger to nose testing.  Gait is stable.    Sensation in intact and equal to light touch throughout all 4 extremities.    She has 2+ reflexes throughout, without Wright's sign or ankle clonus.      Data Review:  MRI Lumbar spine without cheyanne 5/8/23:    Ish Johnson MD - 05/08/2023   Formatting of this note might be different from the original.   History: Lumbar radiculopathy.     TECHNIQUE: Lumbar spine MRI without contrast     COMPARISON: Lumbar spine MRI 12/2/2022     FINDINGS:     There is normal alignment of the lumbar spine. Trace anterolisthesis of L5 on S1. There is a rudimentary disc between S1 and S2 vertebrae. The conus terminates at L1 and has normal imaging appearance.     Interval  evolution of postsurgical changes of left L3-L4 extraforaminal discectomy with interval resolution of previously seen fluid collection lateral to the left L3-L4 facet. Interval decrease in abnormal signal intensity in left posterior paraspinal muscles. Interval resolution of previously seen subcutaneous fluid collection.     L1-2: No posterior disc abnormality. Mild bilateral facet arthropathy. No spinal canal stenosis or neural foraminal narrowing.     L2-3: No posterior disc abnormality. Moderate facet arthropathy. No spinal canal stenosis or neural foraminal narrowing.     L3-4: Mild disc bulge with superimposed small broad-based left foraminal disc protrusion. There is moderate bilateral facet arthropathy. Mild spinal canal stenosis. No right neural foraminal narrowing. Persistent complete effacement of fat planes in the left neural foramen which is likely partially due to postsurgical changes. The exiting L3 nerve is somewhat prominent in size compared to right side at the level of foramen.     L4-5: Small posterior osteophyte and mild disc bulge with central disc fissure. Moderate bilateral facet arthropathy. Mild spinal canal stenosis and mild left lateral recess narrowing. No bilateral neural foraminal narrowing.     L5-S1: Trace anterolisthesis. Tiny disc bulge. Severe right and moderate left facet arthropathy. No spinal canal stenosis. Mild to moderate left and no right foraminal narrowing.     IMPRESSION:       Postsurgical changes of left L3-L4 extraforaminal discectomy with interval resolution of immediate postsurgical changes including previous mentioned fluid collections. Persistent complete effacement of the left L3-L4 neural foramen which is likely due to postsurgical changes, residual small broad-based disc protrusion and enlarged L3 nerve root at the level of foramen.      MRI Lumbar spine without cheyanne 12/2/23:  Alexsandra Sanabria MD - 12/02/2022   Formatting of this note might be different from the  original.   CLINICAL INFORMATION: 72-year-old. Left L3-L4 extraforaminal discectomy (11/15/2022). Left lower extremity pain, severe with ambulation.     TECHNIQUE: Routine unenhanced MRI of the lumbar spine.   Contrast: None.     COMPARISON: Multiple prior studies, most recent 8/1/2022.     FINDINGS:     Transitional lumbar anatomy with rudimentary intervertebral disc at S1-S2. There are 5 nonrib-bearing lumbar-type vertebrae. For the purposes of this exam, the slice series 10, image 18 corresponds to the L5-S1 level.     Unchanged straightening of the lumbar spine lordosis. Trace new grade 1 anterolisthesis of L5 on S1.   Lumbar vertebrae are normal in height. Degenerative endplate marrow signal changes. Multilevel intervertebral disc height loss, desiccation and Schmorl's nodes, worst at the L5-S1 level. Postsurgical changes from left L3-L4 extraforaminal discectomy with small fluid collection lateral to the L3-L4 facet measuring approximately 0.9 x 1.2 x 1.4 cm (series 8, image 25 and series 7, image 4), edema-like signal in the left posterior paraspinal muscles, and a fluid collection in the left paramedian soft tissue, extending from the T12-L1 to the S1-S2 level, measuring approximately 1.3 x 2.7 x 15.7 cm. These fluid collections, likely reflect combination of seroma and some degraded blood.     L1-2: No posterior disc abnormality. Mild bilateral facet arthropathy. No spinal canal stenosis or neural foraminal narrowing.     L2-3: No posterior disc abnormality. Moderate facet arthropathy. No spinal canal stenosis or neural foraminal narrowing.     L3-4: Mild disc bulge and trace endplate ridging. Moderate facet arthropathy. Mild spinal canal stenosis. No right neural foraminal narrowing. Complete effacement of fat planes in the left neural foramen is likely postsurgical change without obvious residual disc bulge. New mild T2 hyperintensity of the exiting L3 nerve is likely reactive change.     L4-5: Posterior  osteophyte and mild disc bulge with central disc fissure. Moderate bilateral facet arthropathy. Mild spinal canal stenosis and mild left lateral recess narrowing. No bilateral neural foraminal narrowing.     L5-S1: New trace anterolisthesis. Tiny disc bulge. Severe right and moderate left facet arthropathy. No spinal canal stenosis. Mild left, no right foraminal narrowing.     Normal appearance and position of the conus medullaris, which terminates at L1.     IMPRESSION:   1.  Interval left L3-L4 extraforaminal discectomy. Multifocal postsurgical fluid lateral to the left L3-L4 facet and subcutaneous back. Complete effacement of the left L3-L4 neural foramen is likely postsurgical change. New mild T2 hyperintensity of the exiting L3 nerve is probably reactive in etiology.   2.  New trace grade 1 anterolisthesis of L5 on S1.     Images were personally reviewed by myself and with the patient.    Assessment and Plan:  In summary, Tamara Omalley is a 74 y.o. female who presented to [facility] ED on 7/31/22 with left leg pain. MRI revealed L3-L4 far lateral disc herniation on the left side. She was recommended conservative treatment with medications, injections, but opted to undergo surgical decompression at [facility] with Dr. Dutta.  The patient continues to have unchanged left leg pain.  She has no updated MRI for review.  The patient is planning on moving to [facility] to be closer to family in June of this year.  At this point I recommend she obtain and updated MRI, x-rays with flexion/extension views to assess for hypermobility and CT to assess bony anatomy.  We discussed the possibility of the need for further decompression and fusion or of spinal cord stimulator for chronic intractable neuropathic pain.  The patient will follow up after she obtains an updated MRI and CT of the lumbar spine and x-rays with flexion/extnesion views.  Should any questions or issues arise in the meantime she will contact my  office.  The patient expressed understanding, agrees with the overall plan and will follow up as stated above.  Thank you for giving us the opportunity to care for Tamara Marrerosandra LEVY, PHYLLIS Allen, am scribing for, and in the presence of, Dr. Hussain MD.    I, Dr. Ruben Nixon MD personally performed the services described in this documentation as scribed by Donald Lozano PA-C in my presence, and it is accurate and complete.      This note was transcribed using voice recognition software. Please disregard grammatic and typographical errors and contact my office if questions arise.     I spent 45 minutes on this date of service performing the following activities: obtaining history, performing examination, entering orders, documenting, preparing for visit, obtaining / reviewing records, providing counseling and education, independently reviewing study/studies, communicating results and coordinating care.